# Patient Record
Sex: MALE | Race: WHITE | NOT HISPANIC OR LATINO | Employment: OTHER | URBAN - METROPOLITAN AREA
[De-identification: names, ages, dates, MRNs, and addresses within clinical notes are randomized per-mention and may not be internally consistent; named-entity substitution may affect disease eponyms.]

---

## 2017-02-06 ENCOUNTER — GENERIC CONVERSION - ENCOUNTER (OUTPATIENT)
Dept: OTHER | Facility: OTHER | Age: 82
End: 2017-02-06

## 2017-03-06 ENCOUNTER — GENERIC CONVERSION - ENCOUNTER (OUTPATIENT)
Dept: OTHER | Facility: OTHER | Age: 82
End: 2017-03-06

## 2017-03-20 ENCOUNTER — TRANSCRIBE ORDERS (OUTPATIENT)
Dept: ADMINISTRATIVE | Facility: HOSPITAL | Age: 82
End: 2017-03-20

## 2017-03-20 ENCOUNTER — APPOINTMENT (OUTPATIENT)
Dept: LAB | Facility: HOSPITAL | Age: 82
End: 2017-03-20
Payer: MEDICARE

## 2017-03-20 DIAGNOSIS — Z00.00 ROUTINE GENERAL MEDICAL EXAMINATION AT A HEALTH CARE FACILITY: ICD-10-CM

## 2017-03-20 DIAGNOSIS — I10 ESSENTIAL HYPERTENSION, MALIGNANT: ICD-10-CM

## 2017-03-20 DIAGNOSIS — F45.8 ANXIETY HYPERVENTILATION: ICD-10-CM

## 2017-03-20 DIAGNOSIS — R73.9 BLOOD GLUCOSE ELEVATED: ICD-10-CM

## 2017-03-20 DIAGNOSIS — D64.9 ANEMIA, UNSPECIFIED: Primary | ICD-10-CM

## 2017-03-20 DIAGNOSIS — E83.51 HYPOCALCEMIA: ICD-10-CM

## 2017-03-20 DIAGNOSIS — F41.9 ANXIETY HYPERVENTILATION: ICD-10-CM

## 2017-03-20 DIAGNOSIS — D64.9 ANEMIA, UNSPECIFIED: ICD-10-CM

## 2017-03-20 DIAGNOSIS — E78.5 HYPERLIPIDEMIA, UNSPECIFIED HYPERLIPIDEMIA TYPE: ICD-10-CM

## 2017-03-20 LAB
ALBUMIN SERPL BCP-MCNC: 3.7 G/DL (ref 3.5–5)
ALP SERPL-CCNC: 65 U/L (ref 46–116)
ALT SERPL W P-5'-P-CCNC: 23 U/L (ref 12–78)
ANION GAP SERPL CALCULATED.3IONS-SCNC: 8 MMOL/L (ref 4–13)
AST SERPL W P-5'-P-CCNC: 24 U/L (ref 5–45)
BASOPHILS # BLD AUTO: 0.04 THOUSANDS/ΜL (ref 0–0.1)
BASOPHILS NFR BLD AUTO: 1 % (ref 0–1)
BILIRUB SERPL-MCNC: 0.4 MG/DL (ref 0.2–1)
BUN SERPL-MCNC: 22 MG/DL (ref 5–25)
CALCIUM SERPL-MCNC: 8.4 MG/DL (ref 8.3–10.1)
CHLORIDE SERPL-SCNC: 105 MMOL/L (ref 100–108)
CO2 SERPL-SCNC: 31 MMOL/L (ref 21–32)
CREAT SERPL-MCNC: 1.04 MG/DL (ref 0.6–1.3)
EOSINOPHIL # BLD AUTO: 0.21 THOUSAND/ΜL (ref 0–0.61)
EOSINOPHIL NFR BLD AUTO: 4 % (ref 0–6)
ERYTHROCYTE [DISTWIDTH] IN BLOOD BY AUTOMATED COUNT: 14.7 % (ref 11.6–15.1)
GFR SERPL CREATININE-BSD FRML MDRD: >60 ML/MIN/1.73SQ M
GLUCOSE P FAST SERPL-MCNC: 100 MG/DL (ref 65–99)
HCT VFR BLD AUTO: 38.1 % (ref 36.5–49.3)
HGB BLD-MCNC: 11.9 G/DL (ref 12–17)
LDLC SERPL DIRECT ASSAY-MCNC: 65 MG/DL (ref 0–100)
LYMPHOCYTES # BLD AUTO: 1.44 THOUSANDS/ΜL (ref 0.6–4.47)
LYMPHOCYTES NFR BLD AUTO: 28 % (ref 14–44)
MCH RBC QN AUTO: 28 PG (ref 26.8–34.3)
MCHC RBC AUTO-ENTMCNC: 31.2 G/DL (ref 31.4–37.4)
MCV RBC AUTO: 90 FL (ref 82–98)
MONOCYTES # BLD AUTO: 0.55 THOUSAND/ΜL (ref 0.17–1.22)
MONOCYTES NFR BLD AUTO: 11 % (ref 4–12)
NEUTROPHILS # BLD AUTO: 2.87 THOUSANDS/ΜL (ref 1.85–7.62)
NEUTS SEG NFR BLD AUTO: 56 % (ref 43–75)
PLATELET # BLD AUTO: 189 THOUSANDS/UL (ref 149–390)
PMV BLD AUTO: 11.4 FL (ref 8.9–12.7)
POTASSIUM SERPL-SCNC: 4.1 MMOL/L (ref 3.5–5.3)
PROT SERPL-MCNC: 7.4 G/DL (ref 6.4–8.2)
RBC # BLD AUTO: 4.25 MILLION/UL (ref 3.88–5.62)
SODIUM SERPL-SCNC: 144 MMOL/L (ref 136–145)
WBC # BLD AUTO: 5.11 THOUSAND/UL (ref 4.31–10.16)

## 2017-03-20 PROCEDURE — 80053 COMPREHEN METABOLIC PANEL: CPT

## 2017-03-20 PROCEDURE — 85025 COMPLETE CBC W/AUTO DIFF WBC: CPT

## 2017-03-20 PROCEDURE — 36415 COLL VENOUS BLD VENIPUNCTURE: CPT

## 2017-03-20 PROCEDURE — 83721 ASSAY OF BLOOD LIPOPROTEIN: CPT

## 2017-03-22 ENCOUNTER — ALLSCRIPTS OFFICE VISIT (OUTPATIENT)
Dept: OTHER | Facility: OTHER | Age: 82
End: 2017-03-22

## 2017-04-03 ENCOUNTER — GENERIC CONVERSION - ENCOUNTER (OUTPATIENT)
Dept: OTHER | Facility: OTHER | Age: 82
End: 2017-04-03

## 2017-04-17 ENCOUNTER — GENERIC CONVERSION - ENCOUNTER (OUTPATIENT)
Dept: OTHER | Facility: OTHER | Age: 82
End: 2017-04-17

## 2017-05-01 ENCOUNTER — GENERIC CONVERSION - ENCOUNTER (OUTPATIENT)
Dept: OTHER | Facility: OTHER | Age: 82
End: 2017-05-01

## 2017-07-05 ENCOUNTER — GENERIC CONVERSION - ENCOUNTER (OUTPATIENT)
Dept: OTHER | Facility: OTHER | Age: 82
End: 2017-07-05

## 2017-08-14 ENCOUNTER — GENERIC CONVERSION - ENCOUNTER (OUTPATIENT)
Dept: OTHER | Facility: OTHER | Age: 82
End: 2017-08-14

## 2017-09-11 ENCOUNTER — GENERIC CONVERSION - ENCOUNTER (OUTPATIENT)
Dept: OTHER | Facility: OTHER | Age: 82
End: 2017-09-11

## 2017-09-25 ENCOUNTER — GENERIC CONVERSION - ENCOUNTER (OUTPATIENT)
Dept: OTHER | Facility: OTHER | Age: 82
End: 2017-09-25

## 2017-09-28 ENCOUNTER — APPOINTMENT (OUTPATIENT)
Dept: LAB | Facility: HOSPITAL | Age: 82
End: 2017-09-28
Payer: MEDICARE

## 2017-09-28 ENCOUNTER — TRANSCRIBE ORDERS (OUTPATIENT)
Dept: ADMINISTRATIVE | Facility: HOSPITAL | Age: 82
End: 2017-09-28

## 2017-09-28 DIAGNOSIS — D64.9 ANEMIA, UNSPECIFIED: Primary | ICD-10-CM

## 2017-09-28 DIAGNOSIS — D64.9 ANEMIA, UNSPECIFIED: ICD-10-CM

## 2017-09-28 LAB
ALBUMIN SERPL BCP-MCNC: 3.9 G/DL (ref 3.5–5)
ALP SERPL-CCNC: 57 U/L (ref 46–116)
ALT SERPL W P-5'-P-CCNC: 25 U/L (ref 12–78)
ANION GAP SERPL CALCULATED.3IONS-SCNC: 7 MMOL/L (ref 4–13)
AST SERPL W P-5'-P-CCNC: 23 U/L (ref 5–45)
BASOPHILS # BLD AUTO: 0.04 THOUSANDS/ΜL (ref 0–0.1)
BASOPHILS NFR BLD AUTO: 1 % (ref 0–1)
BILIRUB SERPL-MCNC: 0.7 MG/DL (ref 0.2–1)
BUN SERPL-MCNC: 21 MG/DL (ref 5–25)
CALCIUM SERPL-MCNC: 8.9 MG/DL (ref 8.3–10.1)
CHLORIDE SERPL-SCNC: 106 MMOL/L (ref 100–108)
CHOLEST SERPL-MCNC: 117 MG/DL (ref 50–200)
CO2 SERPL-SCNC: 30 MMOL/L (ref 21–32)
CREAT SERPL-MCNC: 0.95 MG/DL (ref 0.6–1.3)
EOSINOPHIL # BLD AUTO: 0.22 THOUSAND/ΜL (ref 0–0.61)
EOSINOPHIL NFR BLD AUTO: 4 % (ref 0–6)
ERYTHROCYTE [DISTWIDTH] IN BLOOD BY AUTOMATED COUNT: 15 % (ref 11.6–15.1)
EST. AVERAGE GLUCOSE BLD GHB EST-MCNC: 120 MG/DL
GFR SERPL CREATININE-BSD FRML MDRD: 74 ML/MIN/1.73SQ M
GLUCOSE P FAST SERPL-MCNC: 95 MG/DL (ref 65–99)
HBA1C MFR BLD: 5.8 % (ref 4.2–6.3)
HCT VFR BLD AUTO: 38.4 % (ref 36.5–49.3)
HDLC SERPL-MCNC: 42 MG/DL (ref 40–60)
HGB BLD-MCNC: 12.2 G/DL (ref 12–17)
LDLC SERPL CALC-MCNC: 57 MG/DL (ref 0–100)
LYMPHOCYTES # BLD AUTO: 1.24 THOUSANDS/ΜL (ref 0.6–4.47)
LYMPHOCYTES NFR BLD AUTO: 25 % (ref 14–44)
MCH RBC QN AUTO: 28.5 PG (ref 26.8–34.3)
MCHC RBC AUTO-ENTMCNC: 31.8 G/DL (ref 31.4–37.4)
MCV RBC AUTO: 90 FL (ref 82–98)
MONOCYTES # BLD AUTO: 0.52 THOUSAND/ΜL (ref 0.17–1.22)
MONOCYTES NFR BLD AUTO: 10 % (ref 4–12)
NEUTROPHILS # BLD AUTO: 3.03 THOUSANDS/ΜL (ref 1.85–7.62)
NEUTS SEG NFR BLD AUTO: 60 % (ref 43–75)
PLATELET # BLD AUTO: 171 THOUSANDS/UL (ref 149–390)
PMV BLD AUTO: 11.3 FL (ref 8.9–12.7)
POTASSIUM SERPL-SCNC: 4.2 MMOL/L (ref 3.5–5.3)
PROT SERPL-MCNC: 7.4 G/DL (ref 6.4–8.2)
PSA SERPL-MCNC: 0.7 NG/ML (ref 0–4)
RBC # BLD AUTO: 4.28 MILLION/UL (ref 3.88–5.62)
SODIUM SERPL-SCNC: 143 MMOL/L (ref 136–145)
TRIGL SERPL-MCNC: 88 MG/DL
WBC # BLD AUTO: 5.05 THOUSAND/UL (ref 4.31–10.16)

## 2017-09-28 PROCEDURE — 80053 COMPREHEN METABOLIC PANEL: CPT

## 2017-09-28 PROCEDURE — 80061 LIPID PANEL: CPT

## 2017-09-28 PROCEDURE — 83036 HEMOGLOBIN GLYCOSYLATED A1C: CPT

## 2017-09-28 PROCEDURE — G0103 PSA SCREENING: HCPCS

## 2017-09-28 PROCEDURE — 36415 COLL VENOUS BLD VENIPUNCTURE: CPT

## 2017-09-28 PROCEDURE — 85025 COMPLETE CBC W/AUTO DIFF WBC: CPT

## 2017-10-04 ENCOUNTER — ALLSCRIPTS OFFICE VISIT (OUTPATIENT)
Dept: OTHER | Facility: OTHER | Age: 82
End: 2017-10-04

## 2017-10-10 ENCOUNTER — GENERIC CONVERSION - ENCOUNTER (OUTPATIENT)
Dept: OTHER | Facility: OTHER | Age: 82
End: 2017-10-10

## 2017-10-27 NOTE — PROGRESS NOTES
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)    Plan  Anxiety, Arteriosclerotic cardiovascular disease, Health Maintenance, Hyperglycemia,  Hyperlipidemia, Hypertension, Hypocalcemia, Insomnia    · (1) COMPREHENSIVE METABOLIC PANEL; Status:Active; Requested for:01Apr2018;    · (1) HEMOGLOBIN A1C; Status:Active; Requested for:01Apr2018;    · (1) LDL,DIRECT; Status:Active; Requested for:01Apr2018;    · (1) TSH; Status:Active; Requested for:01Apr2018;    · Follow-up visit in 6 months Evaluation and Treatment  Follow-up  Status: Hold For -  Scheduling  Requested for: 34GEP5590   · Continue with our present treatment plan ; Status:Complete;   Done: 76EAT6372  Need for prophylactic vaccination and inoculation against influenza    · Fluzone High-Dose 0 5 ML Intramuscular Suspension Prefilled Syringe    Discussion/Summary    #1  Hypertension-stable at 1 50/58  Hyperlipidemia-cholesterol numbers are at goal with LDL 57  Hyperglycemia-hemoglobin A1c went up from 0 6 to 5 8%  ASCVD-patient is seen by Cardiology on a regular basis  His physician it is at [facility] continue his good state of health  6 months with labs  Possible side effects of new medications were reviewed with the patient/guardian today  The treatment plan was reviewed with the patient/guardian  The patient/guardian understands and agrees with the treatment plan     Self Referrals: No      Chief Complaint  Follow up anemia, anxiety, hyperlipidemia, HTNBW resultsDose flu immunization administered today - [facility]      History of Present Illness  This is an 26-year-old male who comes in accompanied by his wife for his regular 6 month visit  He is feeling well with no complaints or problems   His blood pressure today is 150/58 and his weight is down 2 lb from the previous visit to 197 lb  Reviewing his labs, his cholesterol numbers are at goal with the LDL 56  His CBC revealed that his hemoglobin went up from 11 9 to 12 2 and is now within normal range  His PSA remains the same at 0 7, glucose 95 down from 100 and his hemoglobin A1c went up from 5 6 to 5 8%  Review of Systems    Constitutional: No fever or chills, feels well, no tiredness, no recent weight gain or weight loss  ENT: no complaints of earache, no hearing loss, no nosebleeds, no nasal discharge, no sore throat, no hoarseness  Cardiovascular: No complaints of slow heart rate, no fast heart rate, no chest pain, no palpitations, no leg claudication, no lower extremity  Respiratory: No complaints of shortness of breath, no wheezing, no cough, no SOB on exertion, no orthopnea or PND  Gastrointestinal: No complaints of abdominal pain, no constipation, no nausea or vomiting, no diarrhea or bloody stools  Genitourinary: No complaints of dysuria, no incontinence, no hesitancy, no nocturia, no genital lesion, no testicular pain  Preventive Quality 65 and Older: Falls Risk: The patient fell 0 times in the past 12 months  The patient currently has no urinary incontinence symptoms  ROS reviewed  Active Problems  1  Anemia (285 9) (D64 9)   2  Anxiety (300 00) (F41 9)   3  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,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)   4  Arthritis (716 90) (M19 90)   5  Encounter for prostate cancer screening (V76 44) (Z12 5)   6  Herpes zoster (053 9) (B02 9)   7  Hyperglycemia (790 29) (R73 9)   8  Hyperlipidemia (272 4) (E78 5)   9  Hypertension (401 9) (I10)   10  Hypocalcemia (275 41) (E83 51)   11  Insomnia (780 52) (G47 00)   12  Need for prophylactic vaccination and inoculation against influenza (V04 81) (Z23)   13  Screening for genitourinary condition (V81 6) (Z13 89)   14  Screening for glaucoma (V80 1) (Z13 5)   15   Trigger thumb of right hand (727 03) (M65 311)    Past Medical History  1  History of upper respiratory infection (V12 09) (Z87 09)   2  History of Need for prophylactic vaccination and inoculation against influenza (V04 81)   (Z23)   3  History of Screening for neurological condition (V80 09) (Z13 89)    The active problems and past medical history were reviewed and updated today  Surgical History  1  History of CABG   2  History of Mitral Valve Replacement    The surgical history was reviewed and updated today  Family History  Mother    1  Family history of Alzheimer Disease    The family history was reviewed and updated today  Social History   · Denied: History of Alcohol Use (History)   · Former smoker (Y34 73) (J20 837)  The social history was reviewed and updated today  The social history was reviewed and is unchanged  Current Meds   1  Furosemide 20 MG Oral Tablet; Therapy: 00KRS5113 to ([phone])  Requested for: 59MJN4229 Recorded   2  Iron Supplement 325 (65 Fe) MG TABS; Therapy: (Recorded:04Oct2017) to Recorded   3  Lipitor 10 MG Oral Tablet; Therapy: 12VPX2670 to ([phone])  Requested for: 54EJI2568 Recorded   4  Lisinopril 5 MG Oral Tablet; Therapy: 78SZF0776 to (Evaluate:02Jan2018)  Requested for: 70WAK7946 Recorded   5  Metoprolol Tartrate 25 MG Oral Tablet; Therapy: 85DOT8116 to (711 365 824)  Requested for: 14ZZM0016 Recorded   6  Potassium Chloride Oly ER 20 MEQ Oral Tablet Extended Release; Therapy: 01QYH3810 to (Last Rx:15Iua3211)  Requested for: 60Cky6415 Ordered   7  Warfarin Sodium 5 MG Oral Tablet; Therapy: 10UCY0603 to (Last Rx:10Oct2011)  Requested for: 10Oct2011 Ordered    The medication list was reviewed and updated today  Allergies  1   No Known Drug Allergies    Vitals  Vital Signs    Recorded: 81BOS9072 09:53AM   Heart Rate 64, L Radial   Pulse Quality Regular, L Radial   Systolic 543, LUE, Sitting   Diastolic 58, LUE, Sitting   BP CUFF SIZE Large   Height 5 ft 11 in   Weight 197 lb    BMI Calculated 27 48   BSA Calculated 2 1     Physical Exam    Constitutional   General appearance: No acute distress, well appearing and well nourished  Pulmonary   Auscultation of lungs: Clear to auscultation, equal breath sounds bilaterally, no wheezes, no rales, no rhonci  Cardiovascular   Auscultation of heart: Normal rate and rhythm, normal S1 and S2, without murmurs  Examination of extremities for edema and/or varicosities: Normal     Lymphatic   Palpation of lymph nodes in neck: No lymphadenopathy  Musculoskeletal   Gait and station: Normal     Skin   Skin and subcutaneous tissue: Normal without rashes or lesions  Psychiatric   Orientation to person, place and time: Normal     Mood and affect: Normal          Results/Data  PHQ-9 Adult Depression Screening 56LMB5426 10:43AM User, s     Test Name Result Flag Reference   PHQ-9 Adult Depression Score 3     Over the last two weeks, how often have you been bothered by any of the following problems? Little interest or pleasure in doing things: Not at all - 0  Feeling down, depressed, or hopeless: Not at all - 0  Trouble falling or staying asleep, or sleeping too much: Several days - 1  Feeling tired or having little energy: Several days - 1  Poor appetite or over eating: Several days - 1  Feeling bad about yourself - or that you are a failure or have let yourself or your family down: Not at all - 0  Trouble concentrating on things, such as reading the newspaper or watching television: Not at all - 0  Moving or speaking so slowly that other people could have noticed   Or the opposite -  being so fidgety or restless that you have been moving around a lot more than usual: Not at all - 0  Thoughts that you would be better off dead, or of hurting yourself in some way: Not at all - 0   PHQ-9 Adult Depression Screening Negative     PHQ-9 Difficulty Level Not difficult at all     PHQ-9 Severity Minimal Depression         Future Appointments    Date/Time Provider Specialty Site   04/12/2018 11:30 AM Andrena Kawasaki, [facility]     Signatures   Electronically signed by : Brenda Schroeder [facility]; Oct  4 2017 10:51AM EST                       (Author)    Electronically signed by : Brenda Schroeder, [facility]; Oct  4 2017 11:08AM EST                       (Author)    Electronically signed by : CHRISTOFER Schuster ; Oct  4 2017  1:16PM EST

## 2017-10-30 ENCOUNTER — GENERIC CONVERSION - ENCOUNTER (OUTPATIENT)
Dept: OTHER | Facility: OTHER | Age: 82
End: 2017-10-30

## 2017-11-27 ENCOUNTER — GENERIC CONVERSION - ENCOUNTER (OUTPATIENT)
Dept: FAMILY MEDICINE CLINIC | Facility: CLINIC | Age: 82
End: 2017-11-27

## 2018-01-11 NOTE — RESULT NOTES
Message   Recorded as Task   Date: 03/30/2016 01:53 PM, Created By: Sunny Huerta   Task Name: Call Patient with results   Assigned To: Preston Armstrong   Regarding Patient: Rossy Grier, Status: Active   Comment:    Sunny Huerta - 30 Mar 2016 1:53 PM     Patient Phone: [phone]    I called the patient with the results of his Hemoccults  2 with a Hemoccults were negative and one was positive  The patient declines a colonoscopy and will not reconsider

## 2018-01-12 VITALS
DIASTOLIC BLOOD PRESSURE: 58 MMHG | SYSTOLIC BLOOD PRESSURE: 150 MMHG | WEIGHT: 197 LBS | BODY MASS INDEX: 27.58 KG/M2 | HEART RATE: 64 BPM | HEIGHT: 71 IN

## 2018-01-13 VITALS
HEIGHT: 71 IN | BODY MASS INDEX: 27.86 KG/M2 | HEART RATE: 64 BPM | WEIGHT: 199 LBS | DIASTOLIC BLOOD PRESSURE: 70 MMHG | SYSTOLIC BLOOD PRESSURE: 124 MMHG

## 2018-01-13 NOTE — RESULT NOTES
Verified Results  Hemoccult Screening - POC 85BJV3962 11:04AM Leopoldo [place]   2 negative and 3rd juhi 3/27/16 was positive       Test Name Result Flag Reference   Hemoccult Positive

## 2018-01-13 NOTE — PROGRESS NOTES
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)    Plan  Anxiety, Arteriosclerotic cardiovascular disease, Health Maintenance, Hyperglycemia,  Hyperlipidemia, Hypertension, Hypocalcemia, Insomnia    · (1) COMPREHENSIVE METABOLIC PANEL; Status:Active; Requested for:01Apr2018;    · (1) HEMOGLOBIN A1C; Status:Active; Requested for:01Apr2018;    · (1) LDL,DIRECT; Status:Active; Requested for:01Apr2018;    · (1) TSH; Status:Active; Requested for:01Apr2018;    · Follow-up visit in 6 months Evaluation and Treatment  Follow-up  Status: Hold For -  Scheduling  Requested for: 09QWZ9901   · Continue with our present treatment plan ; Status:Complete;   Done: 53PRN1354  Need for prophylactic vaccination and inoculation against influenza    · Fluzone High-Dose 0 5 ML Intramuscular Suspension Prefilled Syringe  Screening for genitourinary condition    · *VB - Urinary Incontinence Screen (Dx Z13 89 Screen for UI); Status:Complete -  Retrospective By Protocol Authorization;   Done: 71VYD6963 11:20AM    Discussion/Summary    Initial annual wellness visit  Chief Complaint  Medicare Wellness - [facility]      History of Present Illness  Initial annual wellness visit   The patient is being seen for the initial annual wellness visit  Medicare Screening and Risk Factors   Hospitalizations: no previous hospitalizations  Medicare Screening Tests Risk Questions   Drug and Alcohol Use: The patient is a former cigarette smoker  The patient reports occasional alcohol use  He has never used illicit drugs     Diet and Physical Activity: Current diet includes well balanced meals, low fat food choices, low carbohydrate food choices, low salt food choices, 1 servings of fruit per day, 2 servings of vegetables per day, occasional servings of meat per day, 1 servings of whole grains per day, 1 servings of simple carbohydrates per day, 1 servings of dairy products per day, 2 cups of coffee per day and 1 glass water  He exercises 1-2 times per week  Exercise: walking 10 minutes per week  Mood Disorder and Cognitive Impairment Screening: He denies feeling down, depressed, or hopeless over the past two weeks  He denies feeling little interest or pleasure in doing things over the past two weeks  Cognitive impairment screening: denies difficulty learning/retaining new information, denies difficulty handling complex tasks, denies difficulty with reasoning, denies difficulty with spatial ability and orientation, denies difficulty with language and denies difficulty with behavior  Functional Ability/Level of Safety: Hearing is normal bilaterally  He denies hearing difficulties  He does not use a hearing aid  Activities of daily living details: does not need help using the phone, no transportation help needed, does not need help shopping, no meal preparation help needed, does not need help doing housework, does not need help doing laundry, does not need help managing medications and does not need help managing money  Home safety risk factors:  no unfamiliar surroundings, no loose rugs, no poor household lighting, no uneven floors, no household clutter, grab bars in the bathroom and handrails on the stairs  Advance Directives: Advance directives: living will, durable power of  for health care directives and advance directives  Co-Managers and Medical Equipment/Suppliers: See Patient Care Team      Patient Care Team    Care Team Member Role Specialty Office Number   Saima ERICKSON MD  Cardiology [phone] [facility]  Physician Assistant [phone]         Josh Morgan MD  Cardiology [phone]     Active Problems    1  Anemia (285 9) (D64 9)   2  Anxiety (300 00) (F41 9)   3  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,440  9) (I25 10)   4  Arthritis (716 90) (M19 90)   5  Encounter for prostate cancer screening (V76 44) (Z12 5)   6  Herpes zoster (053 9) (B02 9)   7  Hyperglycemia (790 29) (R73 9)   8  Hyperlipidemia (272 4) (E78 5)   9  Hypertension (401 9) (I10)   10  Hypocalcemia (275 41) (E83 51)   11  Insomnia (780 52) (G47 00)   12  Need for prophylactic vaccination and inoculation against influenza (V04 81) (Z23)   13  Screening for genitourinary condition (V81 6) (Z13 89)   14  Screening for glaucoma (V80 1) (Z13 5)   15  Trigger thumb of right hand (727 03) (M65 311)    Past Medical History    1  History of upper respiratory infection (V12 09) (Z87 09)   2  History of Need for prophylactic vaccination and inoculation against influenza (V04 81)   (Z23)   3  History of Screening for neurological condition (V80 09) (Z13 89)    Surgical History    1  History of CABG   2  History of Mitral Valve Replacement    Family History  Mother    1  Family history of Alzheimer Disease    Social History    · Denied: History of Alcohol Use (History)   · Former smoker (A12 94) (Q66 120)   ·    · No secondhand smoke exposure (V49 89) (Z78 9)   · Retired    Current Meds   1  Furosemide 20 MG Oral Tablet; Therapy: 93FPP2189 to (185 211 824)  Requested for: 42DHE4483 Recorded   2  Iron Supplement 325 (65 Fe) MG TABS; Therapy: (Recorded:04Oct2017) to Recorded   3  Lipitor 10 MG Oral Tablet; Therapy: 63KGG9800 to (749 320 824)  Requested for: 97LRS3410 Recorded   4  Lisinopril 5 MG Oral Tablet; Therapy: 17AYM7074 to (Evaluate:02Jan2018)  Requested for: 66KQV4107 Recorded   5  Metoprolol Tartrate 25 MG Oral Tablet; Therapy: 35LWX5635 to (246 789 824)  Requested for: 19KKD4844 Recorded   6  Potassium Chloride Oly ER 20 MEQ Oral Tablet Extended Release; Therapy: 34IMR6879 to (Last Rx:07Vqh7678)  Requested for: 87Xxm9330 Ordered   7  Warfarin Sodium 5 MG Oral Tablet;    Therapy: 94HMF9460 to (Last Rx:10Oct2011) Requested for: 40NQE1651 Ordered    Allergies    1  No Known Drug Allergies    Immunizations  Influenza --- Wyn Raw: Nov 2011; Series2: [id number]-3011Itgm Andrews: 02-Dec-2013; Todd Late:  17-Oct-2014; Series5: 18-Sep-2015; Series6: 20-Sep-2016   PPSV --- Wyn Raw: Oct 2001   Tetanus --- Series1: May 2008     Vitals  Signs   Recorded: 25EHH9770 09:53AM   Heart Rate: 64, L Radial  Pulse Quality: Regular, L Radial  Systolic: 521, LUE, Sitting  Diastolic: 58, LUE, Sitting  BP Cuff Size: Large  Height: 5 ft 11 in  Weight: 197 lb   BMI Calculated: 27 48  BSA Calculated: 2 1    Results/Data  *VB - Urinary Incontinence Screen (Dx Z13 89 Screen for UI) 47IKE4541 11:20AM Marc Bush     Test Name Result Flag Reference   Urinary Incontinence Assessment 04Oct2017       PHQ-9 Adult Depression Screening 98FFJ8542 10:43AM User, Ahs     Test Name Result Flag Reference   PHQ-9 Adult Depression Score 3     Over the last two weeks, how often have you been bothered by any of the following problems? Little interest or pleasure in doing things: Not at all - 0  Feeling down, depressed, or hopeless: Not at all - 0  Trouble falling or staying asleep, or sleeping too much: Several days - 1  Feeling tired or having little energy: Several days - 1  Poor appetite or over eating: Several days - 1  Feeling bad about yourself - or that you are a failure or have let yourself or your family down: Not at all - 0  Trouble concentrating on things, such as reading the newspaper or watching television: Not at all - 0  Moving or speaking so slowly that other people could have noticed   Or the opposite -  being so fidgety or restless that you have been moving around a lot more than usual: Not at all - 0  Thoughts that you would be better off dead, or of hurting yourself in some way: Not at all - 0   PHQ-9 Adult Depression Screening Negative     PHQ-9 Difficulty Level Not difficult at all     PHQ-9 Severity Minimal Depression         Future Appointments    Date/Time Provider Specialty Site   04/12/2018 11:30 AM Mindy Thompson, [facility]     Signatures   Electronically signed by : Ying Wing, ; Oct  4 2017 10:10AM EST                       (Author)    Electronically signed by : Ying Wing, ; Oct  4 2017 11:18AM EST                       (Author)    Electronically signed by : hSauna Acosta, [facility]; Oct  4 2017 11:45AM EST                       (Author)    Electronically signed by : CHRISTOFER Pak ; Oct  4 2017  1:16PM EST

## 2018-04-01 DIAGNOSIS — F41.9 ANXIETY DISORDER: ICD-10-CM

## 2018-04-01 DIAGNOSIS — E78.5 HYPERLIPIDEMIA: ICD-10-CM

## 2018-04-01 DIAGNOSIS — I10 ESSENTIAL (PRIMARY) HYPERTENSION: ICD-10-CM

## 2018-04-01 DIAGNOSIS — R73.9 HYPERGLYCEMIA: ICD-10-CM

## 2018-04-01 DIAGNOSIS — G47.00 INSOMNIA: ICD-10-CM

## 2018-04-01 DIAGNOSIS — I25.10 ATHEROSCLEROTIC HEART DISEASE OF NATIVE CORONARY ARTERY WITHOUT ANGINA PECTORIS: ICD-10-CM

## 2018-04-01 DIAGNOSIS — E83.51 HYPOCALCEMIA: ICD-10-CM

## 2018-04-01 DIAGNOSIS — Z00.00 ENCOUNTER FOR GENERAL ADULT MEDICAL EXAMINATION WITHOUT ABNORMAL FINDINGS: ICD-10-CM

## 2018-04-03 ENCOUNTER — LAB (OUTPATIENT)
Dept: LAB | Facility: HOSPITAL | Age: 83
End: 2018-04-03
Payer: MEDICARE

## 2018-04-03 DIAGNOSIS — R73.9 HYPERGLYCEMIA: ICD-10-CM

## 2018-04-03 DIAGNOSIS — I25.10 ATHEROSCLEROTIC HEART DISEASE OF NATIVE CORONARY ARTERY WITHOUT ANGINA PECTORIS: ICD-10-CM

## 2018-04-03 DIAGNOSIS — I10 ESSENTIAL (PRIMARY) HYPERTENSION: ICD-10-CM

## 2018-04-03 DIAGNOSIS — E83.51 HYPOCALCEMIA: ICD-10-CM

## 2018-04-03 DIAGNOSIS — F41.9 ANXIETY DISORDER: ICD-10-CM

## 2018-04-03 DIAGNOSIS — E78.5 HYPERLIPIDEMIA: ICD-10-CM

## 2018-04-03 DIAGNOSIS — Z00.00 ENCOUNTER FOR GENERAL ADULT MEDICAL EXAMINATION WITHOUT ABNORMAL FINDINGS: ICD-10-CM

## 2018-04-03 DIAGNOSIS — G47.00 INSOMNIA: ICD-10-CM

## 2018-04-03 LAB
ALBUMIN SERPL BCP-MCNC: 3.8 G/DL (ref 3.5–5)
ALP SERPL-CCNC: 56 U/L (ref 46–116)
ALT SERPL W P-5'-P-CCNC: 22 U/L (ref 12–78)
ANION GAP SERPL CALCULATED.3IONS-SCNC: 7 MMOL/L (ref 4–13)
AST SERPL W P-5'-P-CCNC: 22 U/L (ref 5–45)
BILIRUB SERPL-MCNC: 0.7 MG/DL (ref 0.2–1)
BUN SERPL-MCNC: 25 MG/DL (ref 5–25)
CALCIUM SERPL-MCNC: 8.4 MG/DL (ref 8.3–10.1)
CHLORIDE SERPL-SCNC: 107 MMOL/L (ref 100–108)
CO2 SERPL-SCNC: 31 MMOL/L (ref 21–32)
CREAT SERPL-MCNC: 1 MG/DL (ref 0.6–1.3)
EST. AVERAGE GLUCOSE BLD GHB EST-MCNC: 114 MG/DL
GFR SERPL CREATININE-BSD FRML MDRD: 70 ML/MIN/1.73SQ M
GLUCOSE P FAST SERPL-MCNC: 100 MG/DL (ref 65–99)
HBA1C MFR BLD: 5.6 % (ref 4.2–6.3)
LDLC SERPL DIRECT ASSAY-MCNC: 70 MG/DL (ref 0–100)
POTASSIUM SERPL-SCNC: 4.3 MMOL/L (ref 3.5–5.3)
PROT SERPL-MCNC: 7.2 G/DL (ref 6.4–8.2)
SODIUM SERPL-SCNC: 145 MMOL/L (ref 136–145)
TSH SERPL DL<=0.05 MIU/L-ACNC: 2.58 UIU/ML (ref 0.36–3.74)

## 2018-04-03 PROCEDURE — 80053 COMPREHEN METABOLIC PANEL: CPT

## 2018-04-03 PROCEDURE — 83036 HEMOGLOBIN GLYCOSYLATED A1C: CPT

## 2018-04-03 PROCEDURE — 83721 ASSAY OF BLOOD LIPOPROTEIN: CPT

## 2018-04-03 PROCEDURE — 36415 COLL VENOUS BLD VENIPUNCTURE: CPT

## 2018-04-03 PROCEDURE — 84443 ASSAY THYROID STIM HORMONE: CPT

## 2018-04-12 ENCOUNTER — OFFICE VISIT (OUTPATIENT)
Dept: FAMILY MEDICINE CLINIC | Facility: CLINIC | Age: 83
End: 2018-04-12
Payer: MEDICARE

## 2018-04-12 VITALS
SYSTOLIC BLOOD PRESSURE: 140 MMHG | DIASTOLIC BLOOD PRESSURE: 60 MMHG | HEART RATE: 56 BPM | HEIGHT: 71 IN | BODY MASS INDEX: 27.58 KG/M2 | WEIGHT: 197 LBS

## 2018-04-12 DIAGNOSIS — R73.9 HYPERGLYCEMIA: ICD-10-CM

## 2018-04-12 DIAGNOSIS — I25.10 ARTERIOSCLEROTIC CARDIOVASCULAR DISEASE: ICD-10-CM

## 2018-04-12 DIAGNOSIS — E78.2 MIXED HYPERLIPIDEMIA: ICD-10-CM

## 2018-04-12 DIAGNOSIS — I10 ESSENTIAL HYPERTENSION: Primary | ICD-10-CM

## 2018-04-12 DIAGNOSIS — D64.9 ANEMIA, UNSPECIFIED TYPE: ICD-10-CM

## 2018-04-12 PROCEDURE — 99214 OFFICE O/P EST MOD 30 MIN: CPT | Performed by: FAMILY MEDICINE

## 2018-04-12 RX ORDER — LISINOPRIL 5 MG/1
TABLET ORAL
COMMUNITY
Start: 2011-05-19

## 2018-04-12 RX ORDER — FUROSEMIDE 20 MG/1
TABLET ORAL
COMMUNITY
Start: 2010-07-27

## 2018-04-12 RX ORDER — FERROUS SULFATE 325(65) MG
TABLET ORAL
COMMUNITY

## 2018-04-12 RX ORDER — WARFARIN SODIUM 5 MG/1
TABLET ORAL
COMMUNITY
Start: 2011-10-10

## 2018-04-12 RX ORDER — POTASSIUM CHLORIDE 20 MEQ/1
TABLET, EXTENDED RELEASE ORAL
COMMUNITY
Start: 2011-07-12

## 2018-04-12 RX ORDER — ATORVASTATIN CALCIUM 10 MG/1
TABLET, FILM COATED ORAL
COMMUNITY
Start: 2010-07-27

## 2018-04-12 NOTE — PROGRESS NOTES
Assessment/Plan:    Hypertension  His blood pressure is stable at 140/60  He currently is on lisinopril 5 mg daily, furosemide 20 mg daily  He is on metoprolol tartrate 25 mg daily  Arteriosclerotic cardiovascular disease  Stable and is followed by Cardiology  Anemia  Resolved  He currently takes ferrous sulfate 325 mg daily  Hyperglycemia  The patient's blood sugar is 100 and his hemoglobin A1c is 5 6%  Hyperlipidemia  His LDL is 70 and stable on atorvastatin 10 mg daily  Diagnoses and all orders for this visit:    Essential hypertension  -     CBC and differential; Future  -     Comprehensive metabolic panel; Future  -     HEMOGLOBIN A1C W/ EAG ESTIMATION; Future  -     Lipid Panel with Direct LDL reflex; Future    Hyperglycemia  -     CBC and differential; Future  -     Comprehensive metabolic panel; Future  -     HEMOGLOBIN A1C W/ EAG ESTIMATION; Future  -     Lipid Panel with Direct LDL reflex; Future    Mixed hyperlipidemia  -     CBC and differential; Future  -     Comprehensive metabolic panel; Future  -     HEMOGLOBIN A1C W/ EAG ESTIMATION; Future  -     Lipid Panel with Direct LDL reflex; Future    Arteriosclerotic cardiovascular disease  -     CBC and differential; Future  -     Comprehensive metabolic panel; Future  -     HEMOGLOBIN A1C W/ EAG ESTIMATION; Future  -     Lipid Panel with Direct LDL reflex; Future    Anemia, unspecified type  -     CBC and differential; Future  -     Comprehensive metabolic panel; Future  -     HEMOGLOBIN A1C W/ EAG ESTIMATION; Future  -     Lipid Panel with Direct LDL reflex; Future    Other orders  -     furosemide (LASIX) 20 mg tablet; Take by mouth  -     ferrous sulfate (IRON SUPPLEMENT) 325 (65 Fe) mg tablet; Take by mouth  -     atorvastatin (LIPITOR) 10 mg tablet; Take by mouth  -     lisinopril (ZESTRIL) 5 mg tablet; Take by mouth  -     metoprolol tartrate (LOPRESSOR) 25 mg tablet;  Take by mouth  -     potassium chloride (K-DUR,KLOR-CON) 20 mEq tablet; Take by mouth  -     warfarin (COUMADIN) 5 mg tablet; Take by mouth          Subjective:      Patient ID: Cornelio Pope is a 80 y o  male  CC:  Follow up anemia, anxiety, hyperglycemia, hyperlipidemia, HTN  Review BW results  -  [facility]    HPI:  This is an 60-year-old male who comes in for his regular 6 month visit  He is accompanied by his wife  He is feeling well with no complaints or problems  His blood pressure today is 140/60 and his weight has remained the same at 197 lb  Reviewing his labs, his glucose went up from 95 to 100, hemoglobin A1c went down from 5 8 to 5 6%, LDL is stable at 70 and TSH is within normal range  The following portions of the patient's history were reviewed and updated as appropriate: allergies, current medications, past family history, past medical history, past social history, past surgical history and problem list     Review of Systems   Constitutional: Negative  HENT: Negative  Eyes: Negative  Respiratory: Negative  Cardiovascular: Negative  Gastrointestinal: Negative  Endocrine: Negative  Genitourinary: Negative  Musculoskeletal: Negative  Skin: Negative  Allergic/Immunologic: Negative  Neurological: Negative  Hematological: Negative  Psychiatric/Behavioral: Negative  Vitals:    04/12/18 1130   BP: 140/60   BP Location: Left arm   Patient Position: Sitting   Cuff Size: Large   Pulse: 56   Weight: 89 4 kg (197 lb)   Height: 5' 11" (1 803 m)   Objective:      /60 (BP Location: Left arm, Patient Position: Sitting, Cuff Size: Large)   Pulse 56   Ht 5' 11" (1 803 m)   Wt 89 4 kg (197 lb)   BMI 27 48 kg/m²          Physical Exam   Constitutional: He is oriented to person, place, and time  He appears well-developed and well-nourished  HENT:   Head: Normocephalic     Right Ear: External ear normal    Left Ear: External ear normal    Mouth/Throat: Oropharynx is clear and moist    Eyes: Conjunctivae and EOM are normal  Pupils are equal, round, and reactive to light  Neck: Normal range of motion  Neck supple  Cardiovascular: Normal rate, regular rhythm and normal heart sounds  Pulmonary/Chest: Effort normal and breath sounds normal    Abdominal: Soft  Bowel sounds are normal    Musculoskeletal: Normal range of motion  Neurological: He is alert and oriented to person, place, and time  Skin: Skin is warm  Psychiatric: He has a normal mood and affect  His behavior is normal  Judgment and thought content normal    Nursing note and vitals reviewed

## 2018-04-12 NOTE — ASSESSMENT & PLAN NOTE
His blood pressure is stable at 140/60  He currently is on lisinopril 5 mg daily, furosemide 20 mg daily  He is on metoprolol tartrate 25 mg daily

## 2018-10-15 ENCOUNTER — APPOINTMENT (OUTPATIENT)
Dept: LAB | Facility: HOSPITAL | Age: 83
End: 2018-10-15
Payer: MEDICARE

## 2018-10-15 DIAGNOSIS — E78.2 MIXED HYPERLIPIDEMIA: ICD-10-CM

## 2018-10-15 DIAGNOSIS — I25.10 ARTERIOSCLEROTIC CARDIOVASCULAR DISEASE: ICD-10-CM

## 2018-10-15 DIAGNOSIS — I10 ESSENTIAL HYPERTENSION: ICD-10-CM

## 2018-10-15 DIAGNOSIS — R73.9 HYPERGLYCEMIA: ICD-10-CM

## 2018-10-15 DIAGNOSIS — D64.9 ANEMIA, UNSPECIFIED TYPE: ICD-10-CM

## 2018-10-15 LAB
ALBUMIN SERPL BCP-MCNC: 3.8 G/DL (ref 3.5–5)
ALP SERPL-CCNC: 67 U/L (ref 46–116)
ALT SERPL W P-5'-P-CCNC: 24 U/L (ref 12–78)
ANION GAP SERPL CALCULATED.3IONS-SCNC: 7 MMOL/L (ref 4–13)
AST SERPL W P-5'-P-CCNC: 25 U/L (ref 5–45)
BASOPHILS # BLD AUTO: 0.06 THOUSANDS/ΜL (ref 0–0.1)
BASOPHILS NFR BLD AUTO: 1 % (ref 0–1)
BILIRUB SERPL-MCNC: 0.6 MG/DL (ref 0.2–1)
BUN SERPL-MCNC: 22 MG/DL (ref 5–25)
CALCIUM SERPL-MCNC: 8.8 MG/DL (ref 8.3–10.1)
CHLORIDE SERPL-SCNC: 106 MMOL/L (ref 100–108)
CHOLEST SERPL-MCNC: 133 MG/DL (ref 50–200)
CO2 SERPL-SCNC: 30 MMOL/L (ref 21–32)
CREAT SERPL-MCNC: 1.07 MG/DL (ref 0.6–1.3)
EOSINOPHIL # BLD AUTO: 0.26 THOUSAND/ΜL (ref 0–0.61)
EOSINOPHIL NFR BLD AUTO: 5 % (ref 0–6)
ERYTHROCYTE [DISTWIDTH] IN BLOOD BY AUTOMATED COUNT: 13.9 % (ref 11.6–15.1)
EST. AVERAGE GLUCOSE BLD GHB EST-MCNC: 117 MG/DL
GFR SERPL CREATININE-BSD FRML MDRD: 64 ML/MIN/1.73SQ M
GLUCOSE P FAST SERPL-MCNC: 102 MG/DL (ref 65–99)
HBA1C MFR BLD: 5.7 % (ref 4.2–6.3)
HCT VFR BLD AUTO: 39.4 % (ref 36.5–49.3)
HDLC SERPL-MCNC: 44 MG/DL (ref 40–60)
HGB BLD-MCNC: 12.4 G/DL (ref 12–17)
IMM GRANULOCYTES # BLD AUTO: 0.01 THOUSAND/UL (ref 0–0.2)
IMM GRANULOCYTES NFR BLD AUTO: 0 % (ref 0–2)
LDLC SERPL CALC-MCNC: 65 MG/DL (ref 0–100)
LYMPHOCYTES # BLD AUTO: 1.18 THOUSANDS/ΜL (ref 0.6–4.47)
LYMPHOCYTES NFR BLD AUTO: 23 % (ref 14–44)
MCH RBC QN AUTO: 28.8 PG (ref 26.8–34.3)
MCHC RBC AUTO-ENTMCNC: 31.5 G/DL (ref 31.4–37.4)
MCV RBC AUTO: 91 FL (ref 82–98)
MONOCYTES # BLD AUTO: 0.57 THOUSAND/ΜL (ref 0.17–1.22)
MONOCYTES NFR BLD AUTO: 11 % (ref 4–12)
NEUTROPHILS # BLD AUTO: 3.17 THOUSANDS/ΜL (ref 1.85–7.62)
NEUTS SEG NFR BLD AUTO: 60 % (ref 43–75)
NRBC BLD AUTO-RTO: 0 /100 WBCS
PLATELET # BLD AUTO: 193 THOUSANDS/UL (ref 149–390)
PMV BLD AUTO: 11.7 FL (ref 8.9–12.7)
POTASSIUM SERPL-SCNC: 4.4 MMOL/L (ref 3.5–5.3)
PROT SERPL-MCNC: 7.4 G/DL (ref 6.4–8.2)
RBC # BLD AUTO: 4.31 MILLION/UL (ref 3.88–5.62)
SODIUM SERPL-SCNC: 143 MMOL/L (ref 136–145)
TRIGL SERPL-MCNC: 119 MG/DL
WBC # BLD AUTO: 5.25 THOUSAND/UL (ref 4.31–10.16)

## 2018-10-15 PROCEDURE — 85025 COMPLETE CBC W/AUTO DIFF WBC: CPT

## 2018-10-15 PROCEDURE — 80053 COMPREHEN METABOLIC PANEL: CPT

## 2018-10-15 PROCEDURE — 36415 COLL VENOUS BLD VENIPUNCTURE: CPT

## 2018-10-15 PROCEDURE — 83036 HEMOGLOBIN GLYCOSYLATED A1C: CPT

## 2018-10-15 PROCEDURE — 80061 LIPID PANEL: CPT

## 2018-10-23 ENCOUNTER — OFFICE VISIT (OUTPATIENT)
Dept: FAMILY MEDICINE CLINIC | Facility: CLINIC | Age: 83
End: 2018-10-23
Payer: MEDICARE

## 2018-10-23 VITALS
WEIGHT: 196 LBS | SYSTOLIC BLOOD PRESSURE: 138 MMHG | HEART RATE: 68 BPM | BODY MASS INDEX: 27.34 KG/M2 | DIASTOLIC BLOOD PRESSURE: 62 MMHG

## 2018-10-23 DIAGNOSIS — E78.2 MIXED HYPERLIPIDEMIA: ICD-10-CM

## 2018-10-23 DIAGNOSIS — I10 ESSENTIAL HYPERTENSION: Primary | ICD-10-CM

## 2018-10-23 DIAGNOSIS — Z23 NEED FOR INFLUENZA VACCINATION: ICD-10-CM

## 2018-10-23 DIAGNOSIS — D64.9 ANEMIA, UNSPECIFIED TYPE: ICD-10-CM

## 2018-10-23 DIAGNOSIS — R73.9 HYPERGLYCEMIA: ICD-10-CM

## 2018-10-23 PROCEDURE — 90662 IIV NO PRSV INCREASED AG IM: CPT | Performed by: FAMILY MEDICINE

## 2018-10-23 PROCEDURE — 99214 OFFICE O/P EST MOD 30 MIN: CPT | Performed by: FAMILY MEDICINE

## 2018-10-23 PROCEDURE — G0008 ADMIN INFLUENZA VIRUS VAC: HCPCS | Performed by: FAMILY MEDICINE

## 2018-10-23 RX ORDER — LATANOPROST 50 UG/ML
1 SOLUTION/ DROPS OPHTHALMIC
COMMUNITY

## 2018-10-23 NOTE — ASSESSMENT & PLAN NOTE
His blood pressure is stable 138/62 and he takes lisinopril 5 mg daily and metoprolol tartrate 25 mg daily

## 2018-10-23 NOTE — PROGRESS NOTES
Assessment/Plan:    Anemia  Resolved  Hyperglycemia  His hemoglobin A1c is 5 7% and his fasting sugar is 102  Hyperlipidemia  His cholesterol numbers are at goal with the LDL 65 any takes atorvastatin 10 mg daily  Hypertension  His blood pressure is stable 138/62 and he takes lisinopril 5 mg daily and metoprolol tartrate 25 mg daily  Diagnoses and all orders for this visit:    Essential hypertension  -     Comprehensive metabolic panel; Future  -     LDL cholesterol, direct; Future  -     TSH, 3rd generation; Future    Mixed hyperlipidemia  -     Comprehensive metabolic panel; Future  -     LDL cholesterol, direct; Future  -     TSH, 3rd generation; Future    Hyperglycemia  -     Comprehensive metabolic panel; Future  -     LDL cholesterol, direct; Future  -     TSH, 3rd generation; Future    Anemia, unspecified type  -     Comprehensive metabolic panel; Future  -     LDL cholesterol, direct; Future  -     TSH, 3rd generation; Future    Need for influenza vaccination  -     influenza vaccine, [id number], high-dose, PF 0 5 mL, for patients 65 yr+ (FLUZONE HIGH-DOSE)    Other orders  -     latanoprost (XALATAN) 0 005 % ophthalmic solution; 1 drop daily at bedtime          Subjective: Pt here for f/u to chronic conditions  lab results,flu vaccine  Patient ID: Joby Moseley is a 80 y o  male  This is an 15-year-old male who comes in for his regular 6 month appointment  He is feeling well with no complaints or problems  Has been cheating slightly on his diet however his cholesterol numbers have not shown his cheating  His blood pressure is 138/62 and his weight is down 1 lb from the previous visit to 196 lb  Reviewing his labs his cholesterol numbers are at goal with the LDL 65, CBC is normal, glucose is 102 and his hemoglobin A1c is 5 7%          The following portions of the patient's history were reviewed and updated as appropriate: allergies, current medications, past family history, past medical history, past social history, past surgical history and problem list     Review of Systems   Constitutional: Negative  HENT: Negative  Eyes: Negative  Respiratory: Negative  Cardiovascular: Negative  Gastrointestinal: Negative  Endocrine: Negative  Genitourinary: Negative  Musculoskeletal: Negative  Skin: Negative  Allergic/Immunologic: Negative  Neurological: Negative  Hematological: Negative  Psychiatric/Behavioral: Negative  Objective:      /62   Pulse 68   Wt 88 9 kg (196 lb)   BMI 27 34 kg/m²          Physical Exam   Constitutional: He is oriented to person, place, and time  He appears well-developed and well-nourished  HENT:   Head: Normocephalic  Right Ear: External ear normal    Left Ear: External ear normal    Mouth/Throat: Oropharynx is clear and moist    Eyes: Pupils are equal, round, and reactive to light  Conjunctivae and EOM are normal    Neck: Normal range of motion  Neck supple  Cardiovascular: Normal rate, regular rhythm and normal heart sounds  Pulmonary/Chest: Effort normal and breath sounds normal    Abdominal: Soft  Bowel sounds are normal    Musculoskeletal: Normal range of motion  Neurological: He is alert and oriented to person, place, and time  Skin: Skin is warm  Psychiatric: He has a normal mood and affect  His behavior is normal  Judgment and thought content normal    Nursing note and vitals reviewed

## 2018-11-13 ENCOUNTER — HOSPITAL ENCOUNTER (INPATIENT)
Facility: HOSPITAL | Age: 83
LOS: 3 days | Discharge: HOME | DRG: 813 | End: 2018-11-16
Attending: EMERGENCY MEDICINE | Admitting: HOSPITALIST
Payer: MEDICARE

## 2018-11-13 ENCOUNTER — APPOINTMENT (EMERGENCY)
Dept: RADIOLOGY | Facility: HOSPITAL | Age: 83
DRG: 813 | End: 2018-11-13
Payer: MEDICARE

## 2018-11-13 ENCOUNTER — TELEPHONE (OUTPATIENT)
Dept: FAMILY MEDICINE CLINIC | Facility: CLINIC | Age: 83
End: 2018-11-13

## 2018-11-13 DIAGNOSIS — R77.8 ELEVATED TROPONIN: ICD-10-CM

## 2018-11-13 DIAGNOSIS — D64.9 SEVERE ANEMIA: ICD-10-CM

## 2018-11-13 DIAGNOSIS — K92.2 GI BLEED: Primary | ICD-10-CM

## 2018-11-13 PROBLEM — D68.9 COAGULOPATHY (HCC): Status: ACTIVE | Noted: 2018-11-13

## 2018-11-13 PROBLEM — D62 ACUTE BLOOD LOSS ANEMIA: Status: ACTIVE | Noted: 2018-11-13

## 2018-11-13 LAB
ABO GROUP BLD BPU: NORMAL
ABO GROUP BLD BPU: NORMAL
ABO GROUP BLD: NORMAL
ALBUMIN SERPL BCP-MCNC: 3.4 G/DL (ref 3.5–5)
ALP SERPL-CCNC: 44 U/L (ref 46–116)
ALT SERPL W P-5'-P-CCNC: 22 U/L (ref 12–78)
ANION GAP SERPL CALCULATED.3IONS-SCNC: 10 MMOL/L (ref 4–13)
APTT PPP: 39 SECONDS (ref 26–38)
AST SERPL W P-5'-P-CCNC: 21 U/L (ref 5–45)
ATRIAL RATE: 56 BPM
BASOPHILS # BLD AUTO: 0.03 THOUSANDS/ΜL (ref 0–0.1)
BASOPHILS NFR BLD AUTO: 1 % (ref 0–1)
BILIRUB SERPL-MCNC: 0.4 MG/DL (ref 0.2–1)
BLD GP AB SCN SERPL QL: NEGATIVE
BPU ID: NORMAL
BPU ID: NORMAL
BUN SERPL-MCNC: 34 MG/DL (ref 5–25)
CALCIUM SERPL-MCNC: 8.2 MG/DL (ref 8.3–10.1)
CHLORIDE SERPL-SCNC: 108 MMOL/L (ref 100–108)
CO2 SERPL-SCNC: 24 MMOL/L (ref 21–32)
CREAT SERPL-MCNC: 1.26 MG/DL (ref 0.6–1.3)
CROSSMATCH: NORMAL
CROSSMATCH: NORMAL
EOSINOPHIL # BLD AUTO: 0.22 THOUSAND/ΜL (ref 0–0.61)
EOSINOPHIL NFR BLD AUTO: 4 % (ref 0–6)
ERYTHROCYTE [DISTWIDTH] IN BLOOD BY AUTOMATED COUNT: 14.7 % (ref 11.6–15.1)
GFR SERPL CREATININE-BSD FRML MDRD: 52 ML/MIN/1.73SQ M
GLUCOSE SERPL-MCNC: 120 MG/DL (ref 65–140)
HCT VFR BLD AUTO: 17.9 % (ref 36.5–49.3)
HGB BLD-MCNC: 5.4 G/DL (ref 12–17)
HGB BLD-MCNC: 7.2 G/DL (ref 12–17)
IMM GRANULOCYTES # BLD AUTO: 0.03 THOUSAND/UL (ref 0–0.2)
IMM GRANULOCYTES NFR BLD AUTO: 1 % (ref 0–2)
INR PPP: 4.54 (ref 0.86–1.17)
LYMPHOCYTES # BLD AUTO: 1.38 THOUSANDS/ΜL (ref 0.6–4.47)
LYMPHOCYTES NFR BLD AUTO: 23 % (ref 14–44)
MCH RBC QN AUTO: 28 PG (ref 26.8–34.3)
MCHC RBC AUTO-ENTMCNC: 30.2 G/DL (ref 31.4–37.4)
MCV RBC AUTO: 93 FL (ref 82–98)
MONOCYTES # BLD AUTO: 0.65 THOUSAND/ΜL (ref 0.17–1.22)
MONOCYTES NFR BLD AUTO: 11 % (ref 4–12)
NEUTROPHILS # BLD AUTO: 3.74 THOUSANDS/ΜL (ref 1.85–7.62)
NEUTS SEG NFR BLD AUTO: 60 % (ref 43–75)
NRBC BLD AUTO-RTO: 0 /100 WBCS
PLATELET # BLD AUTO: 279 THOUSANDS/UL (ref 149–390)
PMV BLD AUTO: 10.3 FL (ref 8.9–12.7)
POTASSIUM SERPL-SCNC: 4.1 MMOL/L (ref 3.5–5.3)
PROT SERPL-MCNC: 6.3 G/DL (ref 6.4–8.2)
PROTHROMBIN TIME: 40.7 SECONDS (ref 11.8–14.2)
QRS AXIS: 41 DEGREES
QRSD INTERVAL: 100 MS
QT INTERVAL: 348 MS
QTC INTERVAL: 446 MS
RBC # BLD AUTO: 1.93 MILLION/UL (ref 3.88–5.62)
RH BLD: POSITIVE
SODIUM SERPL-SCNC: 142 MMOL/L (ref 136–145)
SPECIMEN EXPIRATION DATE: NORMAL
T WAVE AXIS: -55 DEGREES
TROPONIN I SERPL-MCNC: 0.07 NG/ML
UNIT DISPENSE STATUS: NORMAL
UNIT DISPENSE STATUS: NORMAL
UNIT PRODUCT CODE: NORMAL
UNIT PRODUCT CODE: NORMAL
UNIT RH: NORMAL
UNIT RH: NORMAL
VENTRICULAR RATE: 99 BPM
WBC # BLD AUTO: 6.05 THOUSAND/UL (ref 4.31–10.16)

## 2018-11-13 PROCEDURE — 93010 ELECTROCARDIOGRAM REPORT: CPT | Performed by: INTERNAL MEDICINE

## 2018-11-13 PROCEDURE — 80053 COMPREHEN METABOLIC PANEL: CPT | Performed by: EMERGENCY MEDICINE

## 2018-11-13 PROCEDURE — 85730 THROMBOPLASTIN TIME PARTIAL: CPT | Performed by: EMERGENCY MEDICINE

## 2018-11-13 PROCEDURE — 86901 BLOOD TYPING SEROLOGIC RH(D): CPT | Performed by: EMERGENCY MEDICINE

## 2018-11-13 PROCEDURE — C9113 INJ PANTOPRAZOLE SODIUM, VIA: HCPCS | Performed by: EMERGENCY MEDICINE

## 2018-11-13 PROCEDURE — 86850 RBC ANTIBODY SCREEN: CPT | Performed by: EMERGENCY MEDICINE

## 2018-11-13 PROCEDURE — 82272 OCCULT BLD FECES 1-3 TESTS: CPT

## 2018-11-13 PROCEDURE — 99223 1ST HOSP IP/OBS HIGH 75: CPT | Performed by: HOSPITALIST

## 2018-11-13 PROCEDURE — 84484 ASSAY OF TROPONIN QUANT: CPT | Performed by: EMERGENCY MEDICINE

## 2018-11-13 PROCEDURE — P9021 RED BLOOD CELLS UNIT: HCPCS

## 2018-11-13 PROCEDURE — 85025 COMPLETE CBC W/AUTO DIFF WBC: CPT | Performed by: EMERGENCY MEDICINE

## 2018-11-13 PROCEDURE — 85610 PROTHROMBIN TIME: CPT | Performed by: EMERGENCY MEDICINE

## 2018-11-13 PROCEDURE — 86920 COMPATIBILITY TEST SPIN: CPT

## 2018-11-13 PROCEDURE — 36415 COLL VENOUS BLD VENIPUNCTURE: CPT

## 2018-11-13 PROCEDURE — 99285 EMERGENCY DEPT VISIT HI MDM: CPT

## 2018-11-13 PROCEDURE — 86900 BLOOD TYPING SEROLOGIC ABO: CPT | Performed by: EMERGENCY MEDICINE

## 2018-11-13 PROCEDURE — 85018 HEMOGLOBIN: CPT | Performed by: HOSPITALIST

## 2018-11-13 PROCEDURE — C9113 INJ PANTOPRAZOLE SODIUM, VIA: HCPCS | Performed by: HOSPITALIST

## 2018-11-13 PROCEDURE — 30233N1 TRANSFUSION OF NONAUTOLOGOUS RED BLOOD CELLS INTO PERIPHERAL VEIN, PERCUTANEOUS APPROACH: ICD-10-PCS | Performed by: EMERGENCY MEDICINE

## 2018-11-13 PROCEDURE — P9016 RBC LEUKOCYTES REDUCED: HCPCS

## 2018-11-13 PROCEDURE — 71046 X-RAY EXAM CHEST 2 VIEWS: CPT

## 2018-11-13 PROCEDURE — 93005 ELECTROCARDIOGRAM TRACING: CPT

## 2018-11-13 RX ORDER — ATORVASTATIN CALCIUM 10 MG/1
10 TABLET, FILM COATED ORAL
Status: DISCONTINUED | OUTPATIENT
Start: 2018-11-13 | End: 2018-11-16 | Stop reason: HOSPADM

## 2018-11-13 RX ORDER — SODIUM CHLORIDE 9 MG/ML
50 INJECTION, SOLUTION INTRAVENOUS CONTINUOUS
Status: DISCONTINUED | OUTPATIENT
Start: 2018-11-13 | End: 2018-11-15

## 2018-11-13 RX ORDER — PHYTONADIONE 10 MG/ML
10 INJECTION, EMULSION INTRAMUSCULAR; INTRAVENOUS; SUBCUTANEOUS ONCE
Status: DISCONTINUED | OUTPATIENT
Start: 2018-11-13 | End: 2018-11-13

## 2018-11-13 RX ORDER — FERROUS SULFATE 325(65) MG
325 TABLET ORAL
Status: DISCONTINUED | OUTPATIENT
Start: 2018-11-14 | End: 2018-11-16 | Stop reason: HOSPADM

## 2018-11-13 RX ORDER — ASPIRIN 81 MG/1
81 TABLET, CHEWABLE ORAL DAILY
COMMUNITY
End: 2018-11-16 | Stop reason: HOSPADM

## 2018-11-13 RX ORDER — LATANOPROST 50 UG/ML
1 SOLUTION/ DROPS OPHTHALMIC
Status: DISCONTINUED | OUTPATIENT
Start: 2018-11-13 | End: 2018-11-16 | Stop reason: HOSPADM

## 2018-11-13 RX ADMIN — SODIUM CHLORIDE 8 MG/HR: 9 INJECTION, SOLUTION INTRAVENOUS at 13:03

## 2018-11-13 RX ADMIN — METOPROLOL TARTRATE 25 MG: 25 TABLET ORAL at 22:13

## 2018-11-13 RX ADMIN — SODIUM CHLORIDE 100 ML/HR: 0.9 INJECTION, SOLUTION INTRAVENOUS at 22:16

## 2018-11-13 RX ADMIN — ATORVASTATIN CALCIUM 10 MG: 10 TABLET, FILM COATED ORAL at 16:51

## 2018-11-13 RX ADMIN — PHYTONADIONE 10 MG: 10 INJECTION, EMULSION INTRAMUSCULAR; INTRAVENOUS; SUBCUTANEOUS at 12:34

## 2018-11-13 RX ADMIN — LATANOPROST 1 DROP: 50 SOLUTION/ DROPS OPHTHALMIC at 22:13

## 2018-11-13 RX ADMIN — SODIUM CHLORIDE 8 MG/HR: 9 INJECTION, SOLUTION INTRAVENOUS at 22:33

## 2018-11-13 RX ADMIN — SODIUM CHLORIDE 80 MG: 9 INJECTION, SOLUTION INTRAVENOUS at 12:29

## 2018-11-13 NOTE — ED NOTES
Pt arrives via EMS from home  Pt has been weaker at home then normal  Also states even just pulling himself up in bed makes him SOB  Pt is very pale  Pt's pulse ox is staying around 90-92% with not a good pleth  Lungs are clear, when attempting to put on nasal cannula pt's pulse ox goes up to 98% on room air  Will continue to monitor pt's pulse ox    EKG Obtained  IV access and blood work obtained  Pt changed into gown  Call bell placed within reach               Valerie Frank RN  11/13/18 4360

## 2018-11-13 NOTE — TELEPHONE ENCOUNTER
Spoke to the patient's wife and he has been admitted to the hospital with a GI bleed  His hemoglobin is now 5 g and he was admitted for transfusions

## 2018-11-13 NOTE — ED PROVIDER NOTES
History  Chief Complaint   Patient presents with    Weakness - Generalized     pt presents to ED from home c/o weakness, and SOB on exertion     This is an 27-year-old male who presents with weakness fatigue shortness of breath increasing over the past week he did have some recent diarrhea no chest pain no fevers or chills no abdominal pain  Does not recall if his stools were melanotic or hematochezia        History provided by:  Patient and relative  Medical Problem   Location:  Generalized  Quality:  Weakness  Severity:  Severe  Duration:  1 week  Timing:  Constant  Progression:  Worsening  Chronicity:  New  Context:  Generalized weakness fatigue and shortness of breath increasing over the past week  Relieved by:  Nothing  Worsened by:  Exertion  Associated symptoms: diarrhea, fatigue and shortness of breath    Associated symptoms: no abdominal pain, no chest pain and no fever        Prior to Admission Medications   Prescriptions Last Dose Informant Patient Reported? Taking?   aspirin 81 mg chewable tablet   Yes Yes   Sig: Chew 81 mg daily   atorvastatin (LIPITOR) 10 mg tablet   Yes No   Sig: Take by mouth   ferrous sulfate (IRON SUPPLEMENT) 325 (65 Fe) mg tablet   Yes No   Sig: Take by mouth   furosemide (LASIX) 20 mg tablet   Yes No   Sig: Take by mouth   latanoprost (XALATAN) 0 005 % ophthalmic solution   Yes No   Si drop daily at bedtime   lisinopril (ZESTRIL) 5 mg tablet   Yes No   Sig: Take by mouth   metoprolol tartrate (LOPRESSOR) 25 mg tablet   Yes No   Sig: Take by mouth   potassium chloride (K-DUR,KLOR-CON) 20 mEq tablet   Yes No   Sig: Take by mouth   warfarin (COUMADIN) 5 mg tablet   Yes No   Sig: Take by mouth      Facility-Administered Medications: None       History reviewed  No pertinent past medical history      Past Surgical History:   Procedure Laterality Date    CORONARY ARTERY BYPASS GRAFT      MITRAL VALVE REPLACEMENT         Family History   Problem Relation Age of Onset    Alzheimer's disease Mother      I have reviewed and agree with the history as documented  Social History   Substance Use Topics    Smoking status: Former Smoker    Smokeless tobacco: Never Used    Alcohol use Yes      Comment: occasional        Review of Systems   Constitutional: Positive for fatigue  Negative for fever  Respiratory: Positive for shortness of breath  Cardiovascular: Negative for chest pain  Gastrointestinal: Positive for diarrhea  Negative for abdominal pain  Neurological: Positive for weakness  All other systems reviewed and are negative  Physical Exam  Physical Exam   Constitutional: He is oriented to person, place, and time  He appears well-developed and well-nourished  No distress  HENT:   Head: Normocephalic and atraumatic  Right Ear: External ear normal    Left Ear: External ear normal    Nose: Nose normal    Mouth/Throat: Oropharynx is clear and moist    Eyes: Pupils are equal, round, and reactive to light  EOM are normal    Neck: Neck supple  No tracheal deviation present  Cardiovascular: Normal rate  Murmur ( mechanical) heard  Irregular   Pulmonary/Chest: Breath sounds normal  No stridor  No respiratory distress  He has no wheezes  He has no rales  Abdominal: Soft  Bowel sounds are normal  He exhibits no distension  There is no tenderness  There is no guarding  Genitourinary: Rectal exam shows guaiac positive stool (Dark brown stool)  Musculoskeletal: Normal range of motion  He exhibits no edema, tenderness or deformity  Neurological: He is alert and oriented to person, place, and time  No cranial nerve deficit  Skin: Skin is warm and dry  He is not diaphoretic  There is pallor  Psychiatric: He has a normal mood and affect  His behavior is normal  Thought content normal    Nursing note and vitals reviewed        Vital Signs  ED Triage Vitals   Temperature Pulse Respirations Blood Pressure SpO2   11/13/18 0950 11/13/18 0945 11/13/18 0945 11/13/18 0945 11/13/18 0945   97 8 °F (36 6 °C) 63 19 145/68 (!) 82 %      Temp Source Heart Rate Source Patient Position - Orthostatic VS BP Location FiO2 (%)   11/13/18 0950 -- -- -- --   Temporal          Pain Score       --                  Vitals:    11/13/18 1045 11/13/18 1100 11/13/18 1115 11/13/18 1130   BP: 115/56 128/58 142/65    Pulse: 87 89 99 96       Visual Acuity      ED Medications  Medications   pantoprazole (PROTONIX) 80 mg in sodium chloride 0 9 % 100 mL IVPB (not administered)   pantoprazole (PROTONIX) 80 mg in sodium chloride 0 9 % 100 mL infusion (not administered)   phytonadione (AQUA-MEPHYTON) 10 mg/mL 10 mg in sodium chloride 0 9 % 50 mL IVPB (not administered)       Diagnostic Studies  Results Reviewed     Procedure Component Value Units Date/Time    Troponin I [[id number]]  (Abnormal) Collected:  11/13/18 1019    Lab Status:  Final result Specimen:  Blood from Arm, Right Updated:  11/13/18 1059     Troponin I 0 07 (H) ng/mL     Comprehensive metabolic panel [[id number]]  (Abnormal) Collected:  11/13/18 1019    Lab Status:  Final result Specimen:  Blood from Arm, Right Updated:  11/13/18 1057     Sodium 142 mmol/L      Potassium 4 1 mmol/L      Chloride 108 mmol/L      CO2 24 mmol/L      ANION GAP 10 mmol/L      BUN 34 (H) mg/dL      Creatinine 1 26 mg/dL      Glucose 120 mg/dL      Calcium 8 2 (L) mg/dL      AST 21 U/L      ALT 22 U/L      Alkaline Phosphatase 44 (L) U/L      Total Protein 6 3 (L) g/dL      Albumin 3 4 (L) g/dL      Total Bilirubin 0 40 mg/dL      eGFR 52 ml/min/1 73sq m     Narrative:         National Kidney Disease Education Program recommendations are as follows:  GFR calculation is accurate only with a steady state creatinine  Chronic Kidney disease less than 60 ml/min/1 73 sq  meters  Kidney failure less than 15 ml/min/1 73 sq  meters      APTT [[id number]]  (Abnormal) Collected:  11/13/18 1019    Lab Status:  Final result Specimen:  Blood from Arm, Right Updated:  11/13/18 1056 PTT 39 (H) seconds     Protime-INR [[id number]]  (Abnormal) Collected:  11/13/18 1019    Lab Status:  Final result Specimen:  Blood from Arm, Right Updated:  11/13/18 1055     Protime 40 7 (H) seconds      INR 4 54 (H)    CBC and differential [[id number]]  (Abnormal) Collected:  11/13/18 1019    Lab Status:  Final result Specimen:  Blood from Arm, Right Updated:  11/13/18 1052     WBC 6 05 Thousand/uL      RBC 1 93 (L) Million/uL      Hemoglobin 5 4 (LL) g/dL      Hematocrit 17 9 (L) %      MCV 93 fL      MCH 28 0 pg      MCHC 30 2 (L) g/dL      RDW 14 7 %      MPV 10 3 fL      Platelets 458 Thousands/uL      nRBC 0 /100 WBCs      Neutrophils Relative 60 %      Immat GRANS % 1 %      Lymphocytes Relative 23 %      Monocytes Relative 11 %      Eosinophils Relative 4 %      Basophils Relative 1 %      Neutrophils Absolute 3 74 Thousands/µL      Immature Grans Absolute 0 03 Thousand/uL      Lymphocytes Absolute 1 38 Thousands/µL      Monocytes Absolute 0 65 Thousand/µL      Eosinophils Absolute 0 22 Thousand/µL      Basophils Absolute 0 03 Thousands/µL     Narrative:        No Clots                 X-ray chest 2 views   Final Result by Bettye Forrester MD (11/13 1056)      No acute cardiopulmonary disease              Workstation performed: [id number]                    Procedures  ECG 12 Lead Documentation  Date/Time: 11/13/2018 10:00 AM  Performed by: Shena Cortes by: Rogelio Carney     ECG reviewed by me, the ED Provider: yes    Patient location:  ED  Rhythm:     Rhythm: atrial fibrillation    ST segments:     ST segments:  Non-specific  CriticalCare Time  Performed by: Rogelio Carney  Authorized by: Rogelio Carney     Critical care provider statement:     Critical care time (minutes):  30    Critical care time was exclusive of:  Separately billable procedures and treating other patients    Critical care was necessary to treat or prevent imminent or life-threatening deterioration of the following conditions: GI bleed      Critical care was time spent personally by me on the following activities:  Obtaining history from patient or surrogate, discussions with consultants, examination of patient, interpretation of cardiac output measurements, ordering and performing treatments and interventions and ordering and review of laboratory studies    I assumed direction of critical care for this patient from another provider in my specialty: no             Phone Contacts  ED Phone Contact    ED Course  ED Course as of Nov 13 1213   Tue Nov 13, 2018   1159 Discussed coagulopathy with Dr Michelle Hernandez at  this time since he is not briskly actively bleeding would just give vitamin K and hold off on FFP or K centra          HEART Risk Score      Most Recent Value   History  0 Filed at: 11/13/2018 1206   ECG  1 Filed at: 11/13/2018 1206   Age  2 Filed at: 11/13/2018 1206   Risk Factors  1 Filed at: 11/13/2018 1206   Troponin  1 Filed at: 11/13/2018 1206   Heart Score Risk Calculator   History  0 Filed at: 11/13/2018 1206   ECG  1 Filed at: 11/13/2018 1206   Age  2 Filed at: 11/13/2018 1206   Risk Factors  1 Filed at: 11/13/2018 1206   Troponin  1 Filed at: 11/13/2018 1206   HEART Score  5 Filed at: 11/13/2018 1206   HEART Score  5 Filed at: 11/13/2018 1206                            MDM  Number of Diagnoses or Management Options  Elevated troponin:   GI bleed:   Severe anemia:   Diagnosis management comments: Generalized weakness differential includes infection/sepsis electrolyte abnormality severe anemia or GI bleed labs and EKG ordered       Amount and/or Complexity of Data Reviewed  Clinical lab tests: ordered      CritCare Time    Disposition  Final diagnoses:   GI bleed   Severe anemia   Elevated troponin     Time reflects when diagnosis was documented in both MDM as applicable and the Disposition within this note     Time User Action Codes Description Comment    11/13/2018 12:00 PM Ellen Lu Add [K92 2] GI bleed 11/13/2018 12:01 PM Cl Bell Add [D64 9] Severe anemia     11/13/2018 12:01 PM Cl Bell Add [R74 8] Elevated troponin       ED Disposition     ED Disposition Condition Comment    Admit  Case was discussed with *Dr Clary Cartwright** and the patient's admission status was agreed to be Admission Status: inpatient status to the service of Dr Clary Cartwright   Follow-up Information    None         Patient's Medications   Discharge Prescriptions    No medications on file     No discharge procedures on file      ED Provider  Electronically Signed by           Harinder Sawyer DO  11/13/18 4888

## 2018-11-13 NOTE — ASSESSMENT & PLAN NOTE
-likely due to upper GI bleeding due to Coumadin coagulopathy  -transfuse 2 units of packed red blood cells  -IV fluids for support  -vitamin K has been given    We will allow the INR to drift down with a history of mechanical mitral valve replacement  -hold Coumadin and aspirin  -EGD tomorrow, discussed with GI

## 2018-11-13 NOTE — ASSESSMENT & PLAN NOTE
-continue beta-blocker, statin  -hold ACE-inhibitor for now is creatinine is 1 26 which gives a creatinine clearance that is borderline for this patient's age    If creatinine room and blood pressure remained stable overnight will resume the patient's ACE-inhibitor tomorrow morning   -no aspirin or Coumadin with GI bleeding  -no Lasix with GI bleeding

## 2018-11-13 NOTE — H&P
H&P- Tanya Mercado 1935, 80 y o  male MRN: [id number]    Unit/Bed#: -02 Encounter: [id number]    Primary Care Provider: Tessa Ulloa PA-C   Date and time admitted to hospital: 11/13/2018  9:44 AM        Coagulopathy (HCC)   Assessment & Plan    -Coumadin coagulopathy causing GI bleeding, likely upper GI bleeding  -hold Coumadin     Hypertension   Assessment & Plan    -continue beta-blocker  -hold ACE-inhibitor for now and follow blood pressure and creatinine trend     Arteriosclerotic cardiovascular disease   Assessment & Plan    -continue beta-blocker, statin  -hold ACE-inhibitor for now is creatinine is 1 26 which gives a creatinine clearance that is borderline for this patient's age  If creatinine room and blood pressure remained stable overnight will resume the patient's ACE-inhibitor tomorrow morning   -no aspirin or Coumadin with GI bleeding  -no Lasix with GI bleeding     * Acute blood loss anemia   Assessment & Plan    -likely due to upper GI bleeding due to Coumadin coagulopathy  -transfuse 2 units of packed red blood cells  -IV fluids for support  -vitamin K has been given  We will allow the INR to drift down with a history of mechanical mitral valve replacement  -hold Coumadin and aspirin  -EGD tomorrow, discussed with GI       VTE Prophylaxis: Pharmacologic VTE Prophylaxis contraindicated due to GI bleed  / sequential compression device   Code Status:  Full  POLST: POLST is not applicable to this patient    Anticipated Length of Stay:  Patient will be admitted on an Inpatient basis with an anticipated length of stay of  > 2 midnights  Justification for Hospital Stay:  GI bleed    Total Time for Visit, including Counseling / Coordination of Care: 45 minutes  Greater than 50% of this total time spent on direct patient counseling and coordination of care      Chief Complaint:   Weakness and shortness of breath    History of Present Illness:    Tanya Mercado is a 80 y o  male who presents with chief complaints of weakness and shortness of breath  The patient reports that 9 days ago he began having weakness and shortness of breath that has progressively worsened  He presented to the ER because he was too weak to ambulate  He reports having 1 episode of diarrhea approximately 9 days ago but did not notice black stool  He denies any black or bloody stool  He denies any chest pain or lightheadedness  He denies other complaints  Patient denies palpitations, cough, nausea, vomiting, abdominal pain, fevers, chills, night sweats, headache, visual disturbances, neck stiffness, new focal neurologic deficit, rash, dysuria, heat or cold intolerance, significant weight gain or loss  Review of Systems:    Review of Systems   Neurological: Negative for seizures ( jmkp)  All other systems reviewed and are negative  -TYPO above due to computer ERROR  Should read: I have reviewed all of the other systems and they are all negative except as per the HPI  Past Medical and Surgical History:     History reviewed  No pertinent past medical history  Past Surgical History:   Procedure Laterality Date    CORONARY ARTERY BYPASS GRAFT      MITRAL VALVE REPLACEMENT         Meds/Allergies:    Prior to Admission medications    Medication Sig Start Date End Date Taking?  Authorizing Provider   aspirin 81 mg chewable tablet Chew 81 mg daily   Yes Historical Provider, MD   atorvastatin (LIPITOR) 10 mg tablet Take by mouth 7/27/10   Historical Provider, MD   ferrous sulfate (IRON SUPPLEMENT) 325 (65 Fe) mg tablet Take by mouth    Historical Provider, MD   furosemide (LASIX) 20 mg tablet Take by mouth 7/27/10   Historical Provider, MD   latanoprost (XALATAN) 0 005 % ophthalmic solution Administer 1 drop to both eyes daily at bedtime      Historical Provider, MD   lisinopril (ZESTRIL) 5 mg tablet Take by mouth 5/19/11   Historical Provider, MD   metoprolol tartrate (LOPRESSOR) 25 mg tablet Take by mouth 1/25/11   Historical Provider, MD   potassium chloride (K-DUR,KLOR-CON) 20 mEq tablet Take by mouth 7/12/11   Historical Provider, MD   warfarin (COUMADIN) 5 mg tablet Take by mouth 10/10/11   Historical Provider, MD     I have reviewed home medications with patient personally  Allergies: No Known Allergies    Social History:     Marital Status: /Civil Union   Substance Use History:   History   Alcohol Use    Yes     Comment: occasional     History   Smoking Status    Former Smoker   Smokeless Tobacco    Never Used     History   Drug Use No       Family History:    non-contributory    Physical Exam:     Vitals:   Blood Pressure: 142/67 (11/13/18 1415)  Pulse: 88 (11/13/18 1415)  Temperature: 98 5 °F (36 9 °C) (11/13/18 1415)  Temp Source: Oral (11/13/18 1415)  Respirations: 15 (11/13/18 1415)  Height: 5' 11" (180 3 cm) (11/13/18 1345)  Weight - Scale: 86 6 kg (190 lb 14 7 oz) (11/13/18 1345)  SpO2: 100 % (11/13/18 1415)    Physical Exam    Gen: NAD, AAOx3, well developed, well nourished  Eyes: EOMI, PERRLA, no scleral icterus, pale conjunctiva  ENMT:  Oropharynx clear of erythema or exudates, no nasal discharge, no otic discharge, moist mucous membranes  Neck:  Supple  Lymph:  No anterior or posterior cervical or supraclavicular lymphadenopathy  Cardiovascular:  Regular rate and rhythm, normal S1-S2, no murmurs, rubs, or gallops  Lungs:  Clear to auscultation bilaterally, no wheezes, or rales, or rhonchi  Abdomen:  Positive bowel sounds, soft, nontender, nondistended, no palpable organomegaly   Skin:  Intact, no obvious lesions or rashes, no edema  Neuro: Cranial nerves 2-12 are intact, non-focal, 5/5 strength in all 4 extremities    Additional Data:     Lab Results: I have personally reviewed pertinent reports          Results from last 7 days  Lab Units 11/13/18  1019   WBC Thousand/uL 6 05   HEMOGLOBIN g/dL 5 4*   HEMATOCRIT % 17 9*   PLATELETS Thousands/uL 279   NEUTROS ABS Thousands/µL 3 74 NEUTROS PCT % 60   LYMPHS PCT % 23   MONOS PCT % 11   EOS PCT % 4       Results from last 7 days  Lab Units 11/13/18  1019   POTASSIUM mmol/L 4 1   CHLORIDE mmol/L 108   CO2 mmol/L 24   BUN mg/dL 34*   CREATININE mg/dL 1 26   ANION GAP mmol/L 10   CALCIUM mg/dL 8 2*   ALBUMIN g/dL 3 4*   TOTAL BILIRUBIN mg/dL 0 40   ALK PHOS U/L 44*   ALT U/L 22   AST U/L 21       Results from last 7 days  Lab Units 11/13/18  1019   INR  4 54*                   Imaging: I have personally reviewed pertinent reports  X-ray chest 2 views   Final Result by Julia Ramos MD (11/13 1056)      No acute cardiopulmonary disease  Workstation performed: [id number]             [facility] Records Reviewed: Yes     ** Please Note: This note has been constructed using a voice recognition system   **

## 2018-11-13 NOTE — TELEPHONE ENCOUNTER
LANCE BROOKS CALLED AND STATED THAT HER  WAS TAKEN BY AMBULANCE TO [facility] THIS MORNING  HE WAS ADMITTED FOR BALANCE AND DIZZINESS ISSUES  SHE ASKED TO SPEAK WITH YOU AND I TOLD HER YOU WERE IN YOUR MEETING  PLEASE GIVE HER A CALL WHEN YOU CAN  THANK YOU

## 2018-11-14 ENCOUNTER — ANESTHESIA EVENT (INPATIENT)
Dept: PERIOP | Facility: HOSPITAL | Age: 83
DRG: 813 | End: 2018-11-14
Payer: MEDICARE

## 2018-11-14 ENCOUNTER — APPOINTMENT (INPATIENT)
Dept: NON INVASIVE DIAGNOSTICS | Facility: HOSPITAL | Age: 83
DRG: 813 | End: 2018-11-14
Payer: MEDICARE

## 2018-11-14 ENCOUNTER — ANESTHESIA (INPATIENT)
Dept: PERIOP | Facility: HOSPITAL | Age: 83
DRG: 813 | End: 2018-11-14
Payer: MEDICARE

## 2018-11-14 PROBLEM — I48.0 PAROXYSMAL ATRIAL FIBRILLATION (HCC): Status: ACTIVE | Noted: 2018-11-14

## 2018-11-14 LAB
BASOPHILS # BLD AUTO: 0.02 THOUSANDS/ΜL (ref 0–0.1)
BASOPHILS NFR BLD AUTO: 0 % (ref 0–1)
EOSINOPHIL # BLD AUTO: 0.19 THOUSAND/ΜL (ref 0–0.61)
EOSINOPHIL NFR BLD AUTO: 4 % (ref 0–6)
ERYTHROCYTE [DISTWIDTH] IN BLOOD BY AUTOMATED COUNT: 15 % (ref 11.6–15.1)
HCT VFR BLD AUTO: 25.9 % (ref 36.5–49.3)
HGB BLD-MCNC: 7.1 G/DL (ref 12–17)
HGB BLD-MCNC: 7.4 G/DL (ref 12–17)
HGB BLD-MCNC: 7.4 G/DL (ref 12–17)
HGB BLD-MCNC: 7.8 G/DL (ref 12–17)
IMM GRANULOCYTES # BLD AUTO: 0.02 THOUSAND/UL (ref 0–0.2)
IMM GRANULOCYTES NFR BLD AUTO: 0 % (ref 0–2)
INR PPP: 1.69 (ref 0.86–1.17)
LYMPHOCYTES # BLD AUTO: 1.02 THOUSANDS/ΜL (ref 0.6–4.47)
LYMPHOCYTES NFR BLD AUTO: 19 % (ref 14–44)
MCH RBC QN AUTO: 26.9 PG (ref 26.8–34.3)
MCHC RBC AUTO-ENTMCNC: 30.1 G/DL (ref 31.4–37.4)
MCV RBC AUTO: 89 FL (ref 82–98)
MONOCYTES # BLD AUTO: 0.53 THOUSAND/ΜL (ref 0.17–1.22)
MONOCYTES NFR BLD AUTO: 10 % (ref 4–12)
NEUTROPHILS # BLD AUTO: 3.58 THOUSANDS/ΜL (ref 1.85–7.62)
NEUTS SEG NFR BLD AUTO: 67 % (ref 43–75)
PLATELET # BLD AUTO: 183 THOUSANDS/UL (ref 149–390)
PMV BLD AUTO: 9.6 FL (ref 8.9–12.7)
PROTHROMBIN TIME: 18.9 SECONDS (ref 11.8–14.2)
RBC # BLD AUTO: 2.9 MILLION/UL (ref 3.88–5.62)
WBC # BLD AUTO: 5.36 THOUSAND/UL (ref 4.31–10.16)

## 2018-11-14 PROCEDURE — 97163 PT EVAL HIGH COMPLEX 45 MIN: CPT

## 2018-11-14 PROCEDURE — 0DB98ZX EXCISION OF DUODENUM, VIA NATURAL OR ARTIFICIAL OPENING ENDOSCOPIC, DIAGNOSTIC: ICD-10-PCS | Performed by: INTERNAL MEDICINE

## 2018-11-14 PROCEDURE — G8978 MOBILITY CURRENT STATUS: HCPCS

## 2018-11-14 PROCEDURE — C9113 INJ PANTOPRAZOLE SODIUM, VIA: HCPCS | Performed by: HOSPITALIST

## 2018-11-14 PROCEDURE — G8979 MOBILITY GOAL STATUS: HCPCS

## 2018-11-14 PROCEDURE — 85018 HEMOGLOBIN: CPT | Performed by: HOSPITALIST

## 2018-11-14 PROCEDURE — 88305 TISSUE EXAM BY PATHOLOGIST: CPT | Performed by: PATHOLOGY

## 2018-11-14 PROCEDURE — 99233 SBSQ HOSP IP/OBS HIGH 50: CPT | Performed by: HOSPITALIST

## 2018-11-14 PROCEDURE — 85025 COMPLETE CBC W/AUTO DIFF WBC: CPT | Performed by: INTERNAL MEDICINE

## 2018-11-14 PROCEDURE — 85610 PROTHROMBIN TIME: CPT | Performed by: HOSPITALIST

## 2018-11-14 RX ORDER — PROPOFOL 10 MG/ML
INJECTION, EMULSION INTRAVENOUS AS NEEDED
Status: DISCONTINUED | OUTPATIENT
Start: 2018-11-14 | End: 2018-11-14 | Stop reason: SURG

## 2018-11-14 RX ORDER — ONDANSETRON 2 MG/ML
4 INJECTION INTRAMUSCULAR; INTRAVENOUS ONCE AS NEEDED
Status: DISCONTINUED | OUTPATIENT
Start: 2018-11-14 | End: 2018-11-14 | Stop reason: HOSPADM

## 2018-11-14 RX ADMIN — LATANOPROST 1 DROP: 50 SOLUTION/ DROPS OPHTHALMIC at 21:32

## 2018-11-14 RX ADMIN — ATORVASTATIN CALCIUM 10 MG: 10 TABLET, FILM COATED ORAL at 16:08

## 2018-11-14 RX ADMIN — METOPROLOL TARTRATE 25 MG: 25 TABLET ORAL at 08:02

## 2018-11-14 RX ADMIN — SODIUM CHLORIDE 100 ML/HR: 0.9 INJECTION, SOLUTION INTRAVENOUS at 07:55

## 2018-11-14 RX ADMIN — SODIUM CHLORIDE 8 MG/HR: 9 INJECTION, SOLUTION INTRAVENOUS at 09:16

## 2018-11-14 RX ADMIN — PROPOFOL 50 MG: 10 INJECTION, EMULSION INTRAVENOUS at 10:51

## 2018-11-14 RX ADMIN — METOPROLOL TARTRATE 25 MG: 25 TABLET ORAL at 21:32

## 2018-11-14 RX ADMIN — POLYETHYLENE GLYCOL 3350, SODIUM SULFATE ANHYDROUS, SODIUM BICARBONATE, SODIUM CHLORIDE, POTASSIUM CHLORIDE 4000 ML: 236; 22.74; 6.74; 5.86; 2.97 POWDER, FOR SOLUTION ORAL at 14:45

## 2018-11-14 RX ADMIN — PROPOFOL 100 MG: 10 INJECTION, EMULSION INTRAVENOUS at 10:45

## 2018-11-14 NOTE — PROGRESS NOTES
Spoke with Steff Rose  [facility]BG 7 1  She did not want third unit of RBC's at this time  NSS continues to infuse at 100ml/hr

## 2018-11-14 NOTE — PHYSICAL THERAPY NOTE
PT eval   11/14/18 1535   Note Type   Note type Eval only   Pain Assessment   Pain Assessment No/denies pain   Pain Score No Pain   Home Living   Type of Home House   Home Equipment Quad cane   Additional Comments just past few days pta otherwise ind no device   Prior Function   Level of Halfway Independent with ADLs and functional mobility   Lives With Spouse   Receives Help From Family   ADL Assistance Independent   IADLs Independent   Falls in the last 6 months 0   Vocational Retired   Restrictions/Precautions   Other Precautions Telemetry;Multiple lines   General   Additional Pertinent History adm with acute blood loss anemia, rec'd blodd transfusions, undergoing w/o for G bleed   Family/Caregiver Present No   Cognition   Overall Cognitive Status WFL   Arousal/Participation Alert   Orientation Level Oriented X4   Memory Within functional limits   Following Commands Follows all commands and directions without difficulty   RUE Assessment   RUE Assessment WFL   LUE Assessment   LUE Assessment WFL   RLE Assessment   RLE Assessment WFL   LLE Assessment   LLE Assessment WFL   Coordination   Movements are Fluid and Coordinated 1   Proprioception   RLE Proprioception Grossly intact   LLE Proprioception Grossly Intact   Bed Mobility   Rolling R 7  Independent   Rolling L 7  Independent   Supine to Sit 7  Independent   Sit to Supine 7  Independent   Transfers   Sit to Stand 5  Supervision   Stand to Sit 5  Supervision   Stand pivot 5  Supervision   Ambulation/Elevation   Gait pattern (unable to assess due to lines etc)   Balance   Static Sitting Normal   Dynamic Sitting Normal   Static Standing Good   Dynamic Standing Good   Endurance Deficit   Endurance Deficit No   Activity Tolerance   Activity Tolerance Patient tolerated treatment well   Nurse Made Aware yes   Assessment   Prognosis Good   Problem List Decreased mobility   Assessment Pt seen at bedside, perform funcitonal transfers to and form commode wihtout device  Unable to further assess gait due to lines andbowel prep for colonoscopy  will recheck after studies completed to ensure safe ind ambulation for home d/c    Barriers to Discharge (medical status)   Goals   Patient Goals find out the problem   STG Expiration Date 11/20/18   Short Term Goal #1 1) safe ind trnasfers 2) safe ind amb with appropriate ' level and up/down 5 steps   Plan   Treatment/Interventions ADL retraining;Functional transfer training;LE strengthening/ROM; Therapeutic exercise; Endurance training;Gait training;Spoke to nursing   PT Frequency 2-3x/wk   Recommendation   Recommendation Home with family support   PT - OK to Discharge No   Barthel Index   Feeding 10   Bathing 5   Grooming Score 5   Dressing Score 5   Bladder Score 10   Bowels Score 10   Toilet Use Score 5   Transfers (Bed/Chair) Score 10   Mobility (Level Surface) Score 0   Stairs Score 0   Barthel Index Score 60   Shalonda Barone, PT

## 2018-11-14 NOTE — ASSESSMENT & PLAN NOTE
-likely due to upper GI bleeding due to Coumadin coagulopathy  -s/p 2 units of packed red blood cells, Hb now 7 8 from 5 4  -IV fluids for support  -vitamin K was given in the ER and the patient's INR is 1 69  -hold Coumadin and aspirin  -EGD today, discussed with GI

## 2018-11-14 NOTE — PROGRESS NOTES
Progress Note - Angélica Wu 1935, 80 y o  male MRN: [id number]    Unit/Bed#: -02 Encounter: [id number]    Primary Care Provider: Alberto Gardner PA-C   Date and time admitted to hospital: 11/13/2018  9:44 AM        Paroxysmal atrial fibrillation (HCC)   Assessment & Plan    -hold Coumadin with GI bleeding  -continue beta-blocker  -rate controlled  -check echocardiogram     Coagulopathy (HCC)   Assessment & Plan    -Coumadin coagulopathy causing GI bleeding, likely upper GI bleeding  -hold Coumadin     Hypertension   Assessment & Plan    -continue beta-blocker  -hold ACE-inhibitor for now and follow blood pressure and creatinine trend     Arteriosclerotic cardiovascular disease   Assessment & Plan    -continue beta-blocker, statin  -continue to hold ACE-inhibitor  Check creatinine in the morning  Blood pressure currently stable   -no aspirin or Coumadin with GI bleeding  -no Lasix with GI bleeding     * Acute blood loss anemia   Assessment & Plan    -likely due to upper GI bleeding due to Coumadin coagulopathy  -s/p 2 units of packed red blood cells, Hb now 7 8 from 5 4  -IV fluids for support  -vitamin K was given in the ER and the patient's INR is 1 69  -hold Coumadin and aspirin  -EGD today, discussed with GI       VTE Pharmacologic Prophylaxis:   Pharmacologic: Pharmacologic VTE Prophylaxis contraindicated due to GI bleeding  Mechanical VTE Prophylaxis in Place: Yes    Patient Centered Rounds: I have performed bedside rounds with nursing staff today  Education and Discussions with Family / Patient:  Patient    Time Spent for Care: 20 minutes  More than 50% of total time spent on counseling and coordination of care as described above      Current Length of Stay: 1 day(s)    Current Patient Status: Inpatient   Certification Statement: The patient will continue to require additional inpatient hospital stay due to GI bleed    Discharge Plan:  Hopefully 1-2 days    Code Status: No Order      Subjective:   Patient without new complaints  Objective:     Vitals:   Temp (24hrs), Av 5 °F (36 9 °C), Min:97 6 °F (36 4 °C), Max:99 1 °F (37 3 °C)    Temp:  [97 6 °F (36 4 °C)-99 1 °F (37 3 °C)] 98 2 °F (36 8 °C)  HR:  [] 87  Resp:  [14-46] 20  BP: ()/(52-81) 143/63  SpO2:  [92 %-100 %] 96 %  Body mass index is 26 35 kg/m²  Input and Output Summary (last 24 hours):        Intake/Output Summary (Last 24 hours) at 18 0856  Last data filed at 18 0755   Gross per 24 hour   Intake             1715 ml   Output              475 ml   Net             1240 ml       Physical Exam:     Physical Exam  Gen: NAD, AAOx3, well developed, well nourished  Eyes: EOMI, PERRLA, no scleral icterus, pale conjunctiva  ENMT:  Oropharynx clear of erythema or exudates, no nasal discharge, no otic discharge, moist mucous membranes  Neck:  Supple  Cardiovascular:   normal rate, irregularly irregular rhythm, normal S1-S2, no murmurs, rubs, or gallops  Lungs:  Clear to auscultation bilaterally, no wheezes, or rales, or rhonchi  Abdomen:  Positive bowel sounds, soft, nontender, nondistended, no palpable organomegaly   Skin:  Intact, no obvious lesions or rashes, no edema  Neuro: Cranial nerves 2-12 are intact, non-focal, 5/5 strength in all 4 extremities    Additional Data:     Labs:      Results from last 7 days  Lab Units 18  0436   WBC Thousand/uL 5 36   HEMOGLOBIN g/dL 7 8*   HEMATOCRIT % 25 9*   PLATELETS Thousands/uL 183   NEUTROS PCT % 67   LYMPHS PCT % 19   MONOS PCT % 10   EOS PCT % 4       Results from last 7 days  Lab Units 18  1019   POTASSIUM mmol/L 4 1   CHLORIDE mmol/L 108   CO2 mmol/L 24   BUN mg/dL 34*   CREATININE mg/dL 1 26   ANION GAP mmol/L 10   CALCIUM mg/dL 8 2*   ALBUMIN g/dL 3 4*   TOTAL BILIRUBIN mg/dL 0 40   ALK PHOS U/L 44*   ALT U/L 22   AST U/L 21       Results from last 7 days  Lab Units 18  0436   INR  1 69*                       * I Have Reviewed All Lab Data Listed Above  * Additional Pertinent Lab Tests Reviewed: [address] Admission Reviewed    Recent Cultures (last 7 days):           Last 24 Hours Medication List:     Current Facility-Administered Medications:  atorvastatin 10 mg Oral Daily With Delaney Valle MD    ferrous sulfate 325 mg Oral Daily With Breakfast Niranjan Martins MD    latanoprost 1 drop Both Eyes HS Niranjan Martins MD    metoprolol tartrate 25 mg Oral Q12H [facility] Niranjan Martins MD    pantoprozole (PROTONIX) infusion (Continuous) 8 mg/hr Intravenous Continuous Niranjan Martins MD Last Rate: 8 mg/hr (11/13/18 9888)   sodium chloride 100 mL/hr Intravenous Continuous Niranjan Martins MD Last Rate: 100 mL/hr (11/14/18 1209)        Today, Patient Was Seen By: Niranjan Martins MD    ** Please Note: Dictation voice to text software may have been used in the creation of this document   **

## 2018-11-14 NOTE — PHYSICIAN ADVISOR
Current patient class: Inpatient  The patient is currently on Hospital Day: 2 at [place] [address]      The patient was admitted to the hospital at [phone] on 11/13/18 for the following diagnosis:  Weakness [R53 1]  GI bleed [K92 2]  Elevated troponin [R74 8]  Severe anemia [D64 9]       There is documentation in the medical record of an expected length of stay of at least 2 midnights  The patient is therefore expected to satisfy the 2 midnight benchmark and given the 2 midnight presumption is appropriate for INPATIENT ADMISSION  Given this expectation of a satisfying stay, CMS instructs us that the patient is most often appropriate for inpatient admission under part A provided medical necessity is documented in the chart  After review of the relevant documentation, labs, vital signs and test results, the patient is appropriate for INPATIENT ADMISSION  Admission to the hospital as an inpatient is a complex decision making process which requires the practitioner to consider the patients presenting complaint, history and physical examination and all relevant testing  With this in mind, in this case, the patient was deemed appropriate for INPATIENT ADMISSION  After review of the documentation and testing available at the time of the admission I concur with this clinical determination of medical necessity  Rationale is as follows: The patient is an 69-year-old male who presented to the hospital on November 13, 2018  He is admitted as an inpatient  He has underlying paroxysmal atrial fibrillation with Coumadin coagulopathy likely causing upper gastrointestinal bleeding with acute blood loss anemia  He required 2 units of PRBCs so far along with vitamin K  he is expected to undergo EGD today  Inpatient level care remains appropriate based on anticipated length of stay along with medical necessity and severity of illness upon presentation      The patients vitals on arrival were ED Triage Vitals   Temperature Pulse Respirations Blood Pressure SpO2   11/13/18 0950 11/13/18 0945 11/13/18 0945 11/13/18 0945 11/13/18 0945   97 8 °F (36 6 °C) 63 19 145/68 92 %      Temp Source Heart Rate Source Patient Position - Orthostatic VS BP Location FiO2 (%)   11/13/18 0950 11/13/18 1324 11/13/18 1324 11/13/18 1324 --   Temporal Monitor Lying Left arm       Pain Score       11/13/18 1345       No Pain           History reviewed  No pertinent past medical history  Past Surgical History:   Procedure Laterality Date    CORONARY ARTERY BYPASS GRAFT      MITRAL VALVE REPLACEMENT             Consults have been placed to:   IP CONSULT TO GASTROENTEROLOGY    Vitals:    11/14/18 0300 11/14/18 0610 11/14/18 0757 11/14/18 0947   BP: 98/52 108/54 143/63 134/60   BP Location: Right arm  Right arm    Pulse: 73 79 87 68   Resp: 20 18 20 20   Temp: 98 2 °F (36 8 °C) 98 2 °F (36 8 °C)  99 2 °F (37 3 °C)   TempSrc: Oral   Oral   SpO2: 97% 96% 96% 99%   Weight:  85 7 kg (188 lb 15 oz)     Height:           Most recent labs:    Recent Labs      11/13/18   1019   11/14/18   0436   WBC  6 05   --   5 36   HGB  5 4*   < >  7 8*   HCT  17 9*   --   25 9*   PLT  279   --   183   K  4 1   --    --    CALCIUM  8 2*   --    --    BUN  34*   --    --    CREATININE  1 26   --    --    INR  4 54*   --   1 69*   TROPONINI  0 07*   --    --    AST  21   --    --    ALT  22   --    --    ALKPHOS  44*   --    --     < > = values in this interval not displayed         Scheduled Meds:  Current Facility-Administered Medications:  [MAR Hold] atorvastatin 10 mg Oral Daily With Roslyn Vargas MD    [MAR Hold] ferrous sulfate 325 mg Oral Daily With Breakfast Lawanda Peacock MD    [facility] Hold] latanoprost 1 drop Both Eyes HS Lawanda Peacock MD    [facility] Hold] metoprolol tartrate 25 mg Oral Q12H [facility] Lawanda Peacock MD    pantoprozole (PROTONIX) infusion (Continuous) 8 mg/hr Intravenous Continuous Lawanda Peacock MD Last Rate: 8 mg/hr (11/14/18 6947) sodium chloride 100 mL/hr Intravenous Continuous Mac Herrera MD Last Rate: 100 mL/hr (11/14/18 0755)     Continuous Infusions:  pantoprozole (PROTONIX) infusion (Continuous) 8 mg/hr Last Rate: 8 mg/hr (11/14/18 9922)   sodium chloride 100 mL/hr Last Rate: 100 mL/hr (11/14/18 0755)     PRN Meds:      Surgical procedures (if appropriate):  Procedure(s):  ESOPHAGOGASTRODUODENOSCOPY (EGD)

## 2018-11-14 NOTE — UTILIZATION REVIEW
Initial Clinical Review    Admission: Date/Time/Statement:INPT 11/13/18 @ 1203     Orders Placed This Encounter   Procedures    Inpatient Admission (expected length of stay for this patient is greater than two midnights)     Standing Status:   Standing     Number of Occurrences:   1     Order Specific Question:   Admitting Physician     Answer:   Reji Mckee [[id number]]     Order Specific Question:   Level of Care     Answer:   Level 1 Stepdown [13]     Order Specific Question:   Estimated length of stay     Answer:   More than 2 Midnights     Order Specific Question:   Certification     Answer:   I certify that inpatient services are medically necessary for this patient for a duration of greater than two midnights  See H&P and MD Progress Notes for additional information about the patient's course of treatment  ED: Date/Time/Mode of Arrival:   ED Arrival Information     Expected Arrival Acuity Means of Arrival Escorted By Service Admission Type    - 11/13/2018 09:41 Urgent Ambulance [facility]) General Medicine Urgent    Arrival Complaint    Weakness          Chief Complaint:   Chief Complaint   Patient presents with    Weakness - Generalized     pt presents to ED from home c/o weakness, and SOB on exertion       History of Illness: 80 y o  male who presents with chief complaints of weakness and shortness of breath  The patient reports that 9 days ago he began having weakness and shortness of breath that has progressively worsened  He presented to the ER because he was too weak to ambulate  He reports having 1 episode of diarrhea approximately 9 days ago but did not notice black stool      ED Vital Signs:   ED Triage Vitals   Temperature Pulse Respirations Blood Pressure SpO2   11/13/18 0950 11/13/18 0945 11/13/18 0945 11/13/18 0945 11/13/18 0945   97 8 °F (36 6 °C) 63 19 145/68 92 %      Temp Source Heart Rate Source Patient Position - Orthostatic VS BP Location FiO2 (%)   11/13/18 0950 11/13/18 1324 11/13/18 1324 11/13/18 1324 --   Temporal Monitor Lying Left arm       Pain Score       11/13/18 1345       No Pain        Wt Readings from Last 1 Encounters:   11/14/18 85 7 kg (188 lb 15 oz)       Vital Signs (abnormal):   11/13/18 1215  --  88   25  119/56  --  94 %  --  --   11/13/18 1200  --  97   25  118/57  --  99 %  --  --   11/13/18 1145  --  92  22  115/59  --  96 %  --  --   11/13/18 1130  --  96  17  131/63  --  100 %  --  --   11/13/18 1115  --  99  17  142/65  --  95 %  --  --   11/13/18 1100  --  89  16  128/58  --  --  --  --   11/13/18 1045  --  87  16  115/56  --  --  --  --   11/13/18 1015  --  75  19  115/53  --  100 %  --  --   11/13/18 1000  --  79  18  116/55  --  100 %  --  --   11/13/18 0950  97 8 °F (36 6 °C)  --  --  --  --  --  --  --   11/13/18 0945  --  63  19  145/68  --  92 %  --  --         Abnormal Labs/Diagnostic Test Results:   hgb 5 4  hct 17 9  Bun 34  Ca 8 2  Alb 3 4  Alk phos 44  inr 4 54    CXR:wnl    ED Treatment:   Medication Administration from 11/13/2018 0941 to 11/13/2018 1340       Date/Time Order Dose Route Action Action by Comments     11/13/2018 1249 pantoprazole (PROTONIX) 80 mg in sodium chloride 0 9 % 100 mL IVPB 0 mg Intravenous Stopped Valerie Frank RN      11/13/2018 1229 pantoprazole (PROTONIX) 80 mg in sodium chloride 0 9 % 100 mL IVPB 80 mg Intravenous [address] Valerie Frank, MANDA      11/13/2018 1303 pantoprazole (PROTONIX) 80 mg in sodium chloride 0 9 % 100 mL infusion 8 mg/hr Intravenous [address] Valerie Frank RN      11/13/2018 1200 phytonadione (AQUA-MEPHYTON) 10 mg/mL SC/IM injection 10 mg   Subcutaneous Canceled Entry Valerie Frank, RN      11/13/2018 1303 phytonadione (AQUA-MEPHYTON) 10 mg/mL 10 mg in sodium chloride 0 9 % 50 mL IVPB 0 mg Intravenous Stopped Valerie Frank, RN      11/13/2018 1234 phytonadione (AQUA-MEPHYTON) 10 mg/mL 10 mg in sodium chloride 0 9 % 50 mL IVPB 10 mg Intravenous New Bag Valerie Frank, RN Past Medical/Surgical History: Active Ambulatory Problems     Diagnosis Date Noted    Anemia 08/01/2012    Anxiety 12/01/2012    Arteriosclerotic cardiovascular disease 08/01/2012    Arthritis 12/01/2012    Herpes zoster 02/12/2015    Hyperglycemia 03/29/2016    Hyperlipidemia 08/01/2012    Hypertension 08/01/2012    Hypocalcemia 03/29/2016    Insomnia 08/05/2013     Resolved Ambulatory Problems     Diagnosis Date Noted    No Resolved Ambulatory Problems     No Additional Past Medical History       Admitting Diagnosis: Weakness [R53 1]  GI bleed [K92 2]  Elevated troponin [R74 8]  Severe anemia [D64 9]    Age/Sex: 80 y o  male    Assessment/Plan: 79 yo male C/O SOB, weakness with coumadin coagulopathy, causing upper GI bleed, hold coumadin  HTN cont beta blocker, hold ACE, follow bp and creat  ACD, cont beta blocker, statin, hold ACE, creat 1 26  Acute blood loss anemia d/t upper GI bleed, PRBC 2 units, [email or web address], vit k, hold coumadin, asa, EGD tomorrow, GI consult  Anticipated Length of Stay:  Patient will be admitted on an Inpatient basis with an anticipated length of stay of  > 2 midnights     Justification for Hospital Stay:  GI bleed    Admission Orders:  INPT  TELE  PT/OT  PRBC 3 UNITS  NPO  BEDREST  CONSULT GASTROENTEROLOGY  O2  Scheduled Meds:   Current Facility-Administered Medications:  atorvastatin 10 mg Oral Daily With Maria MD Josue    ferrous sulfate 325 mg Oral Daily With Breakfast Betty Chavez MD    latanoprost 1 drop Both Eyes HS Betty Chavez MD    metoprolol tartrate 25 mg Oral Q12H [address] Betty Chavez MD    pantoprozole (PROTONIX) infusion (Continuous) 8 mg/hr Intravenous Continuous Betty Chavez MD Last Rate: 8 mg/hr (11/14/18 0416)   sodium chloride 100 mL/hr Intravenous Continuous Betty Chavez MD Last Rate: 100 mL/hr (11/14/18 5546)     Continuous Infusions:   pantoprozole (PROTONIX) infusion (Continuous) 8 mg/hr Last Rate: 8 mg/hr (11/14/18 0916) sodium chloride 100 mL/hr Last Rate: 100 mL/hr (11/14/18 0755)     PRN Meds:

## 2018-11-14 NOTE — ANESTHESIA PREPROCEDURE EVALUATION
Review of Systems/Medical History  Patient summary reviewed  Chart reviewed      Cardiovascular  Hyperlipidemia, Hypertension controlled, Valve replacement mitral valve  replacement, CAD , History of CABG, CHF compensated CHF,    Pulmonary  Smoker ex-smoker  ,        GI/Hepatic    GI bleeding , active and > 500ml blood loss,        Negative  ROS        Endo/Other  Negative endo/other ROS      GYN  Negative gynecology ROS          Hematology  Anemia acute blood loss anemia,  Coagulation disorder currently taking oral anticoagulants,    Musculoskeletal    Arthritis     Neurology  Negative neurology ROS      Psychology   Anxiety,              Physical Exam    Airway    Mallampati score: III  TM Distance: <3 FB  Neck ROM: limited     Dental   No notable dental hx     Cardiovascular  Cardiovascular exam normal    Pulmonary  Pulmonary exam normal     Other Findings        Anesthesia Plan  ASA Score- 3     Anesthesia Type- IV sedation with anesthesia with ASA Monitors  Additional Monitors:   Airway Plan:         Plan Factors-    Induction- intravenous  Postoperative Plan-     Informed Consent- Anesthetic plan and risks discussed with patient

## 2018-11-14 NOTE — SOCIAL WORK
Met with patient to discuss the role of Care Management  Patient resides eith his wife in a ranch home with 3 YANETH  He is independent of ADL's and is caregiver for his wife who is not well  She us currently being cared for by their son  Patient recently has been usuing a quad cane  He has been followed by [facility] in the past   He plans to return home at discharge and is unsure of discharge needs

## 2018-11-14 NOTE — ASSESSMENT & PLAN NOTE
-continue beta-blocker, statin  -continue to hold ACE-inhibitor  Check creatinine in the morning    Blood pressure currently stable   -no aspirin or Coumadin with GI bleeding  -no Lasix with GI bleeding

## 2018-11-14 NOTE — OP NOTE
OPERATIVE REPORT  PATIENT NAME: Kaitlynn Kendall    :  1935  MRN: [id number]  Pt Location:  GI ROOM 01    ESOPHAGOGASTRODUODENOSCOPY    PROCEDURE: EGD with biopsy    SEDATION: Monitored anesthesia care, check anesthesia records    ASA Class: 3    INDICATIONS:  Anemia recent melena    CONSENT:  Informed consent was obtained for the procedure, including sedation after explaining the risks and benefits of the procedure  Risks including but not limited to bleeding, perforation, infection, and missed lesion  PREPARATION:   Telemetry, pulse oximetry, blood pressure were monitored throughout the procedure  Patient was identified by myself both verbally and by visual inspection of ID band  DESCRIPTION:   Patient was placed in the left lateral decubitus position and was sedated with the above medication  The gastroscope was introduced in to the oropharynx and the esophagus was intubated under direct visualization  Scope was passed down the esophagus up to 2nd part of the duodenum  A careful inspection was made as the gastroscope was withdrawn, including a retroflexed view of the stomach; findings and interventions are described below  SPECIMENS TAKEN:      Two small-bowel biopsies taken no bleeding    ESTIMATED BLOOD LOSS:      In significant      FINDINGS:    #1  Esophagus- normal  #2  Stomach- normal    #3  Duodenum- question scalloping of the folds biopsies taken for celiac         IMPRESSIONS:      Essentially negative EGD small bowel biopsy pending    RECOMMENDATIONS:     Await the results of the biopsies  Would recommend colonoscopy if the patient is agreeable  COMPLICATIONS:  None; patient tolerated the procedure well            DISPOSITION: PACU           CONDITION: Stable    SIGNATURE: Fady Mehta MD  DATE: 2018  TIME: 10:58 AM

## 2018-11-14 NOTE — PLAN OF CARE
Problem: PHYSICAL THERAPY ADULT  Goal: Performs mobility at highest level of function for planned discharge setting  See evaluation for individualized goals  Treatment/Interventions: ADL retraining, Functional transfer training, LE strengthening/ROM, Therapeutic exercise, Endurance training, Gait training, Spoke to nursing          See flowsheet documentation for full assessment, interventions and recommendations  Outcome: Progressing  Prognosis: Good  Problem List: Decreased mobility  Assessment: Pt seen at bedside, perform funcitonal transfers to and form commode wihtout device  Unable to further assess gait due to lines andbowel prep for colonoscopy  will recheck after studies completed to ensure safe ind ambulation for home d/c   Barriers to Discharge:  (medical status)     Recommendation: Home with family support     PT - OK to Discharge: No    See flowsheet documentation for full assessment

## 2018-11-15 ENCOUNTER — ANESTHESIA EVENT (INPATIENT)
Dept: PERIOP | Facility: HOSPITAL | Age: 83
DRG: 813 | End: 2018-11-15
Payer: MEDICARE

## 2018-11-15 ENCOUNTER — ANESTHESIA (INPATIENT)
Dept: PERIOP | Facility: HOSPITAL | Age: 83
DRG: 813 | End: 2018-11-15
Payer: MEDICARE

## 2018-11-15 LAB
ABO GROUP BLD BPU: NORMAL
ABO GROUP BLD BPU: NORMAL
ANION GAP SERPL CALCULATED.3IONS-SCNC: 9 MMOL/L (ref 4–13)
BPU ID: NORMAL
BPU ID: NORMAL
BUN SERPL-MCNC: 16 MG/DL (ref 5–25)
CALCIUM SERPL-MCNC: 7.4 MG/DL (ref 8.3–10.1)
CHLORIDE SERPL-SCNC: 113 MMOL/L (ref 100–108)
CO2 SERPL-SCNC: 23 MMOL/L (ref 21–32)
CREAT SERPL-MCNC: 0.97 MG/DL (ref 0.6–1.3)
CROSSMATCH: NORMAL
CROSSMATCH: NORMAL
ERYTHROCYTE [DISTWIDTH] IN BLOOD BY AUTOMATED COUNT: 14.3 % (ref 11.6–15.1)
FERRITIN SERPL-MCNC: 19 NG/ML (ref 8–388)
GFR SERPL CREATININE-BSD FRML MDRD: 72 ML/MIN/1.73SQ M
GLUCOSE SERPL-MCNC: 89 MG/DL (ref 65–140)
HCT VFR BLD AUTO: 21 % (ref 36.5–49.3)
HGB BLD-MCNC: 6.4 G/DL (ref 12–17)
HGB BLD-MCNC: 7.8 G/DL (ref 12–17)
HGB BLD-MCNC: 9.2 G/DL (ref 12–17)
INR PPP: 1.49 (ref 0.86–1.17)
IRON SATN MFR SERPL: 4 %
IRON SERPL-MCNC: 12 UG/DL (ref 65–175)
MCH RBC QN AUTO: 27.5 PG (ref 26.8–34.3)
MCHC RBC AUTO-ENTMCNC: 30.5 G/DL (ref 31.4–37.4)
MCV RBC AUTO: 90 FL (ref 82–98)
PLATELET # BLD AUTO: 202 THOUSANDS/UL (ref 149–390)
PMV BLD AUTO: 9.9 FL (ref 8.9–12.7)
POTASSIUM SERPL-SCNC: 3.7 MMOL/L (ref 3.5–5.3)
PROTHROMBIN TIME: 17.2 SECONDS (ref 11.8–14.2)
RBC # BLD AUTO: 2.33 MILLION/UL (ref 3.88–5.62)
SODIUM SERPL-SCNC: 145 MMOL/L (ref 136–145)
TIBC SERPL-MCNC: 281 UG/DL (ref 250–450)
UNIT DISPENSE STATUS: NORMAL
UNIT DISPENSE STATUS: NORMAL
UNIT PRODUCT CODE: NORMAL
UNIT PRODUCT CODE: NORMAL
UNIT RH: NORMAL
UNIT RH: NORMAL
WBC # BLD AUTO: 5.68 THOUSAND/UL (ref 4.31–10.16)

## 2018-11-15 PROCEDURE — G8988 SELF CARE GOAL STATUS: HCPCS

## 2018-11-15 PROCEDURE — 83550 IRON BINDING TEST: CPT | Performed by: HOSPITALIST

## 2018-11-15 PROCEDURE — 85610 PROTHROMBIN TIME: CPT | Performed by: HOSPITALIST

## 2018-11-15 PROCEDURE — 82728 ASSAY OF FERRITIN: CPT | Performed by: HOSPITALIST

## 2018-11-15 PROCEDURE — P9021 RED BLOOD CELLS UNIT: HCPCS

## 2018-11-15 PROCEDURE — 85027 COMPLETE CBC AUTOMATED: CPT | Performed by: HOSPITALIST

## 2018-11-15 PROCEDURE — 97166 OT EVAL MOD COMPLEX 45 MIN: CPT

## 2018-11-15 PROCEDURE — 85018 HEMOGLOBIN: CPT | Performed by: HOSPITALIST

## 2018-11-15 PROCEDURE — G8989 SELF CARE D/C STATUS: HCPCS

## 2018-11-15 PROCEDURE — 99233 SBSQ HOSP IP/OBS HIGH 50: CPT | Performed by: HOSPITALIST

## 2018-11-15 PROCEDURE — 0W3P8ZZ CONTROL BLEEDING IN GASTROINTESTINAL TRACT, VIA NATURAL OR ARTIFICIAL OPENING ENDOSCOPIC: ICD-10-PCS | Performed by: INTERNAL MEDICINE

## 2018-11-15 PROCEDURE — 83540 ASSAY OF IRON: CPT | Performed by: HOSPITALIST

## 2018-11-15 PROCEDURE — G8987 SELF CARE CURRENT STATUS: HCPCS

## 2018-11-15 PROCEDURE — 97116 GAIT TRAINING THERAPY: CPT

## 2018-11-15 PROCEDURE — 97530 THERAPEUTIC ACTIVITIES: CPT

## 2018-11-15 PROCEDURE — 80048 BASIC METABOLIC PNL TOTAL CA: CPT | Performed by: HOSPITALIST

## 2018-11-15 RX ORDER — SODIUM CHLORIDE 9 MG/ML
50 INJECTION, SOLUTION INTRAVENOUS CONTINUOUS
Status: DISCONTINUED | OUTPATIENT
Start: 2018-11-15 | End: 2018-11-15

## 2018-11-15 RX ORDER — PROPOFOL 10 MG/ML
INJECTION, EMULSION INTRAVENOUS AS NEEDED
Status: DISCONTINUED | OUTPATIENT
Start: 2018-11-15 | End: 2018-11-15 | Stop reason: SURG

## 2018-11-15 RX ORDER — LISINOPRIL 5 MG/1
5 TABLET ORAL DAILY
Status: DISCONTINUED | OUTPATIENT
Start: 2018-11-15 | End: 2018-11-16 | Stop reason: HOSPADM

## 2018-11-15 RX ORDER — SODIUM CHLORIDE 9 MG/ML
INJECTION, SOLUTION INTRAVENOUS CONTINUOUS PRN
Status: DISCONTINUED | OUTPATIENT
Start: 2018-11-15 | End: 2018-11-15 | Stop reason: SURG

## 2018-11-15 RX ADMIN — FERROUS SULFATE TAB 325 MG (65 MG ELEMENTAL FE) 325 MG: 325 (65 FE) TAB at 08:16

## 2018-11-15 RX ADMIN — LATANOPROST 1 DROP: 50 SOLUTION/ DROPS OPHTHALMIC at 22:34

## 2018-11-15 RX ADMIN — PROPOFOL 40 MG: 10 INJECTION, EMULSION INTRAVENOUS at 09:37

## 2018-11-15 RX ADMIN — PROPOFOL 40 MG: 10 INJECTION, EMULSION INTRAVENOUS at 09:43

## 2018-11-15 RX ADMIN — ATORVASTATIN CALCIUM 10 MG: 10 TABLET, FILM COATED ORAL at 16:47

## 2018-11-15 RX ADMIN — PROPOFOL 20 MG: 10 INJECTION, EMULSION INTRAVENOUS at 09:34

## 2018-11-15 RX ADMIN — PROPOFOL 20 MG: 10 INJECTION, EMULSION INTRAVENOUS at 09:25

## 2018-11-15 RX ADMIN — PROPOFOL 20 MG: 10 INJECTION, EMULSION INTRAVENOUS at 09:28

## 2018-11-15 RX ADMIN — METOPROLOL TARTRATE 25 MG: 25 TABLET ORAL at 22:34

## 2018-11-15 RX ADMIN — METOPROLOL TARTRATE 25 MG: 25 TABLET ORAL at 08:16

## 2018-11-15 RX ADMIN — FERROUS SULFATE TAB 325 MG (65 MG ELEMENTAL FE) 325 MG: 325 (65 FE) TAB at 05:43

## 2018-11-15 RX ADMIN — SODIUM CHLORIDE: 0.9 INJECTION, SOLUTION INTRAVENOUS at 09:15

## 2018-11-15 RX ADMIN — PROPOFOL 60 MG: 10 INJECTION, EMULSION INTRAVENOUS at 09:22

## 2018-11-15 RX ADMIN — SODIUM CHLORIDE 100 ML/HR: 0.9 INJECTION, SOLUTION INTRAVENOUS at 05:43

## 2018-11-15 RX ADMIN — PROPOFOL 20 MG: 10 INJECTION, EMULSION INTRAVENOUS at 09:31

## 2018-11-15 RX ADMIN — PROPOFOL 20 MG: 10 INJECTION, EMULSION INTRAVENOUS at 09:30

## 2018-11-15 NOTE — PLAN OF CARE
Problem: PHYSICAL THERAPY ADULT  Goal: Performs mobility at highest level of function for planned discharge setting  See evaluation for individualized goals  Treatment/Interventions: ADL retraining, Functional transfer training, LE strengthening/ROM, Therapeutic exercise, Endurance training, Gait training, Spoke to nursing          See flowsheet documentation for full assessment, interventions and recommendations  Outcome: Progressing  Prognosis: Good  Problem List: Decreased mobility  Assessment: Pt progressing with mobility  Able to amb shor tdistance in room  Instr to ring for staff prior to going to   Will follow for 1-2 additional sessions to assess gait further in hallway and steps  Barriers to Discharge:  (medical status)     Recommendation: Home independently     PT - OK to Discharge: No    See flowsheet documentation for full assessment

## 2018-11-15 NOTE — ANESTHESIA PREPROCEDURE EVALUATION
Review of Systems/Medical History  Patient summary reviewed  Chart reviewed      Cardiovascular  Pacemaker/AICD, Hyperlipidemia, Hypertension controlled, Valve replacement , Past MI > 6 months, CAD , Cardiac stents > 1 year Dysrhythmias , atrial fibrillation, CHF compensated CHF,    Pulmonary  Smoker ex-smoker  , COPD ,        GI/Hepatic    GI bleeding ,        Negative  ROS        Endo/Other  Negative endo/other ROS      GYN       Hematology  Anemia acute blood loss anemia,     Musculoskeletal    Arthritis     Neurology   Psychology   Anxiety, Depression ,              Physical Exam    Airway    Mallampati score: II  TM Distance: >3 FB  Neck ROM: full     Dental   No notable dental hx     Cardiovascular  Rhythm: regular, Rate: normal, Cardiovascular exam normal    Pulmonary  Pulmonary exam normal Breath sounds clear to auscultation,     Other Findings        Anesthesia Plan  ASA Score- 3     Anesthesia Type- IV sedation with anesthesia with ASA Monitors  Additional Monitors:   Airway Plan:         Plan Factors-    Induction- intravenous  Postoperative Plan-     Informed Consent- Anesthetic plan and risks discussed with patient  I personally reviewed this patient with the CRNA  Discussed and agreed on the Anesthesia Plan with the CRNA  Dara Soulier

## 2018-11-15 NOTE — ASSESSMENT & PLAN NOTE
-hold Coumadin with GI bleeding  -continue beta-blocker  -rate controlled  -obtain echocardiogram results from cardiologist's office

## 2018-11-15 NOTE — PROGRESS NOTES
Resumed care of patient from RN, Noelle Smiley  Patient stable and resting in bed  Ongoing assessment

## 2018-11-15 NOTE — PROGRESS NOTES
Progress Note - Kaitlynn Kendall 1935, 80 y o  male MRN: [id number]    Unit/Bed#: -02 Encounter: [id number]    Primary Care Provider: Manas Walter PA-C   Date and time admitted to hospital: 11/13/2018  9:44 AM        Paroxysmal atrial fibrillation (HCC)   Assessment & Plan    -hold Coumadin with GI bleeding  -continue beta-blocker  -rate controlled  -obtain echocardiogram results from cardiologist's office     Coagulopathy ([facility] [address] )   Assessment & Plan    -Coumadin coagulopathy causing GI bleeding  -hold Coumadin     Hypertension   Assessment & Plan    -continue beta-blocker, restart ACE-inhibitor     Arteriosclerotic cardiovascular disease   Assessment & Plan    -continue beta-blocker, statin  -restart ACE-inhibitor    -no aspirin or Coumadin with GI bleeding  -no Lasix with GI bleeding     * Acute blood loss anemia   Assessment & Plan    -due to GI bleeding due to Coumadin coagulopathy  -transfuse 2 more units of packed red blood cells this morning  -IV fluids for support  -vitamin K was given in the ER and the patient's INR is 1 49  -hold Coumadin and aspirin  -11/14/18 EGD:  #1  Esophagus- normal  #2  Stomach- normal  #3  Duodenum- question scalloping of the folds biopsies taken for celiac  -colonoscopy today       VTE Pharmacologic Prophylaxis:   Pharmacologic: Pharmacologic VTE Prophylaxis contraindicated due to GI bleed  Mechanical VTE Prophylaxis in Place: Yes    Patient Centered Rounds: I have performed bedside rounds with nursing staff today  Education and Discussions with Family / Patient:  Patient    Time Spent for Care: 20 minutes  More than 50% of total time spent on counseling and coordination of care as described above      Current Length of Stay: 2 day(s)    Current Patient Status: Inpatient   Certification Statement: The patient will continue to require additional inpatient hospital stay due to GI bleed    Discharge Plan:  1-2 days    Code Status: Level 1 - Full Code      Subjective: Patient without new complaints today  Objective:     Vitals:   Temp (24hrs), Av 3 °F (36 8 °C), Min:97 7 °F (36 5 °C), Max:99 2 °F (37 3 °C)    Temp:  [97 7 °F (36 5 °C)-99 2 °F (37 3 °C)] 97 7 °F (36 5 °C)  HR:  [] 100  Resp:  [16-20] 18  BP: (102-166)/(55-79) 166/79  SpO2:  [94 %-100 %] 97 %  Body mass index is 25 89 kg/m²  Input and Output Summary (last 24 hours): Intake/Output Summary (Last 24 hours) at 11/15/18 0832  Last data filed at 11/15/18 0816   Gross per 24 hour   Intake          2405 67 ml   Output             1150 ml   Net          1255 67 ml       Physical Exam:     Physical Exam  Gen: NAD, AAOx3, well developed, well nourished  Eyes: EOMI, PERRLA, no scleral icterus, pale conjunctiva  ENMT:  Oropharynx clear of erythema or exudates, no nasal discharge, no otic discharge, moist mucous membranes  Neck:  Supple, mild JVD  Cardiovascular:   normal rate, irregularly irregular rhythm, normal S1-S2, no murmurs, rubs, or gallops  Lungs:  Clear to auscultation bilaterally, no wheezes, or rales, or rhonchi  Abdomen:  Positive bowel sounds, soft, nontender, nondistended, no palpable organomegaly   Skin:  Intact, no obvious lesions or rashes, no edema  Neuro: Cranial nerves 2-12 are intact, non-focal, 5/5 strength in all 4 extremities    Additional Data:     Labs:      Results from last 7 days  Lab Units 11/15/18  0430  18  0436   WBC Thousand/uL 5 68  --  5 36   HEMOGLOBIN g/dL 6 4*  < > 7 8*   HEMATOCRIT % 21 0*  --  25 9*   PLATELETS Thousands/uL 202  --  183   NEUTROS PCT %  --   --  67   LYMPHS PCT %  --   --  19   MONOS PCT %  --   --  10   EOS PCT %  --   --  4   < > = values in this interval not displayed      Results from last 7 days  Lab Units 11/15/18  0430 18  1019   POTASSIUM mmol/L 3 7 4 1   CHLORIDE mmol/L 113* 108   CO2 mmol/L 23 24   BUN mg/dL 16 34*   CREATININE mg/dL 0 97 1 26   ANION GAP mmol/L 9 10   CALCIUM mg/dL 7 4* 8 2*   ALBUMIN g/dL  --  3 4*   TOTAL BILIRUBIN mg/dL  --  0 40   ALK PHOS U/L  --  44*   ALT U/L  --  22   AST U/L  --  21       Results from last 7 days  Lab Units 11/15/18  0430   INR  1 49*                       * I Have Reviewed All Lab Data Listed Above  * Additional Pertinent Lab Tests Reviewed: [place] Admission Reviewed    Recent Cultures (last 7 days):           Last 24 Hours Medication List:     Current Facility-Administered Medications:  atorvastatin 10 mg Oral Daily With Steven Montes MD    ferrous sulfate 325 mg Oral Daily With Breakfast Lynne Amaya MD    latanoprost 1 drop Both Eyes HS Lynne Amaya MD    lisinopril 5 mg Oral Daily Lynne Amaya MD    metoprolol tartrate 25 mg Oral Q12H [address] Lynne Amaya MD    pantoprozole (PROTONIX) infusion (Continuous) 8 mg/hr Intravenous Continuous Lynne Amaya MD Last Rate: 8 mg/hr (11/14/18 6490)        Today, Patient Was Seen By: Lynne Amaya MD    ** Please Note: Dictation voice to text software may have been used in the creation of this document   **

## 2018-11-15 NOTE — PROGRESS NOTES
Blood started, Iv leaking  New Iv placed in Right Wrist patent and secured and dressing placed  Iv in Left AC removed  Blood restarted  Sbar report given to MANDA Adams  Questions answered and care handed off

## 2018-11-15 NOTE — SOCIAL WORK
PT recommending home with family support  Spoke with patient who feels that he will not need VNA when he returns home  Will follow

## 2018-11-15 NOTE — ASSESSMENT & PLAN NOTE
-due to GI bleeding due to Coumadin coagulopathy  -transfuse 2 more units of packed red blood cells this morning  -IV fluids for support  -vitamin K was given in the ER and the patient's INR is 1 49  -hold Coumadin and aspirin  -11/14/18 EGD:  #1  Esophagus- normal  #2  Stomach- normal  #3   Duodenum- question scalloping of the folds biopsies taken for celiac  -colonoscopy today

## 2018-11-15 NOTE — OP NOTE
OPERATIVE REPORT  PATIENT NAME: David Rose    :  1935  MRN: [id number]  Pt Location:  GI ROOM 01    Colonoscopy Procedure Note    Procedure: Colonoscopy    Sedation: Monitored anesthesia care, check anesthesia records      ASA Class: 3    INDICATIONS: Anemia, Iron Deficiency, acute blood loss anemia    POST-OP DIAGNOSIS: See the impression below    Procedure Details     Prior colonoscopy: 5 years ago  Informed consent was obtained for the procedure, including sedation  Risks of perforation, hemorrhage, adverse drug reaction and aspiration were discussed  The patient was placed in the left lateral decubitus position  Based on the pre-procedure assessment, including review of the patient's medical history, medications, allergies, and review of systems, he had been deemed to be an appropriate candidate for conscious sedation; he was therefore sedated with the medications listed below  The patient was monitored continuously with telemetry, pulse oximetry, blood pressure monitoring, and direct observations  A rectal examination was performed  The variable-stiffness pediatric colonoscope was inserted into the rectum and advanced under direct vision to the cecum, which was identified by the ileocecal valve and appendiceal orifice  The quality of the colonic preparation was fair  A careful inspection was made as the colonoscope was withdrawn, including a retroflexed view of the rectum; findings and interventions are described below  Specimens Taken:  none    Estimated Blood Loss:  none    Findings:  1  Fair prep throughout the colon  However, small amounts of semi-solid/liquid light brown stool noted throughout the colon  NO blood noted in the colon  2  One small nonbleeding ascending colon angioectasia s/p successful bicap cautery for bleeding prevention  3  Moderate sigmoid diverticulosis  4  Small internal hemorrhoids             Complications:  None; patient tolerated the procedure well     Impression:    One small angioectasia in the ascending colon - this was not bleeding but destroyed for prevention using bicap cautery  Otherwise, no bleeding seen in the colon  Moderate diverticulosis  Small hemorrhoids  Fair prep  Recommendations:  Resume home meds  Resume previous diet  Monitor H&H and transfuse to hgb >8  Given the significant drop in hgb with no overt GI bleeding, likely chronic angioectasias being aggravated by anticoagulation  Recommend proceeding with small bowel capsule as outpatient if ongoing drops in hgb  No further overt bleeding noted off Coumadin - can resume once hgb stabilizes  Follow up with GI in 2-3 weeks after discharge  Repeat colonoscopy not recommended  If repeat colonoscopy is not being recommended, this is because of age (age = 77 or greater)        SIGNATURE: Adrian Tripp MD  DATE: November 15, 2018  TIME: 9:46 AM  '

## 2018-11-15 NOTE — ASSESSMENT & PLAN NOTE
-continue beta-blocker, statin  -restart ACE-inhibitor    -no aspirin or Coumadin with GI bleeding  -no Lasix with GI bleeding

## 2018-11-15 NOTE — OCCUPATIONAL THERAPY NOTE
[address]  Evaluation     Patient Name: Dontae Dougherty  CEXXZ'R Date: 11/15/2018  Problem List  Patient Active Problem List   Diagnosis    Anemia    Anxiety    Arteriosclerotic cardiovascular disease    Arthritis    Herpes zoster    Hyperglycemia    Hyperlipidemia    Hypertension    Hypocalcemia    Insomnia    Severe anemia    GI bleed    Acute blood loss anemia    Coagulopathy (HCC)    Paroxysmal atrial fibrillation (HCC)     Past Medical History  History reviewed  No pertinent past medical history  Past Surgical History  Past Surgical History:   Procedure Laterality Date    CORONARY ARTERY BYPASS GRAFT      ESOPHAGOGASTRODUODENOSCOPY N/A 11/14/2018    Procedure: ESOPHAGOGASTRODUODENOSCOPY (EGD);   Surgeon: Layne Lopez MD;  Location:  MAIN OR;  Service: Gastroenterology    MITRAL VALVE REPLACEMENT           11/15/18 1215   Note Type   Note type Eval only   Restrictions/Precautions   Other Precautions Telemetry;Multiple lines   Pain Assessment   Pain Assessment No/denies pain   Pain Score No Pain   Home Living   Type of [facility] One level  (3STE)   Bathroom Shower/Tub Walk-in shower   Bathroom Equipment Grab bars around toilet;Commode;Built-in shower seat   Bathroom Accessibility Accessible   Home Equipment Quad cane   Additional Comments Quad cane was recent in response to onset of weakness   Prior Function   Level of [place] Independent with ADLs and functional mobility   Lives With Milagros Help From Family   ADL Assistance Independent   IADLs Independent   Falls in the last 6 months 0   Vocational Retired   Comments Patient provides IADL care for spouse who has low vision and DM   Lifestyle   Autonomy Independent in ADLs IADLs and functional mobility, +    Reciprocal Relationships Lives with wife, son lives nearby   Psychosocial   Psychosocial (WDL) WDL   Subjective   Subjective "How soon do I get to go home?"    ADL   Where Assessed Edge of bed   Eating Assistance [address] to Stand 7  Independent   Stand to Sit 7  Independent   Stand pivot 7  Independent   Additional Comments Pt perfroms BUE AROM/MMT screen while standing and is able to maintain balance without difficulty    Functional Mobility   Functional Mobility 7  Independent   Additional Comments Pt does not use cane  Balance   Static Sitting Normal   Dynamic Sitting Normal   Static Standing Good   Dynamic Standing Good   Activity Tolerance   Activity Tolerance Patient tolerated treatment well   Nurse Made Aware OK to see per Juanita De Los Santos RN   RUE Assessment   RUE Assessment WFL   LUE Assessment   LUE Assessment WFL   Hand Function   Gross Motor Coordination Functional   Fine Motor Coordination Functional   Vision-Basic Assessment   Current Vision Wears glasses all the time   Patient Visual Report (No changes to functional vision since onset of symptoms  )   Cognition   Overall Cognitive Status [facility]   Arousal/Participation Alert; Cooperative   Attention Within functional limits   Orientation Level Oriented X4   Memory Within functional limits   Following Commands Follows all commands and directions without difficulty   Assessment   Prognosis Good   Assessment Pt is a 80 y o  male seen for OT evaluation s/p admit to [facility] on 11/13/2018 w/ Acute blood loss anemia  Comorbidities affecting pt's functional performance at time of assessment include: Anemia, anxiety, arteriosclerotic cardiovascular disease, arthritis, hyperglycemia, hyperlipedemia, hypertension, hypocalcemia, insomnia, severe anemia, GI bleed    Prior to admission, pt was independent in all ADLs/IADLs, functional mobility (with recent use of a cane secondary to weakness and SOB) and driving, assisted his wife who is not in good health  Upon evaluation: Pt requires no assistance for ADLs/functional mobility, and reports he is hoping to return home without the use of his cane  Pt was able to demonstrate independent LB dressing, safe toilet transfer and ambulation in his room with good standing balance  No skilled OT indicated at this time  From OT standpoint, recommendation at time of d/c would be home independent       Goals   Patient Goals "Go home"   Plan   OT Frequency Eval only   Barthel Index   Feeding 10   Bathing 5   Grooming Score 5   Dressing Score 10   Bladder Score 10   Bowels Score 10   Toilet Use Score 10   Transfers (Bed/Chair) Score 15   Mobility (Level Surface) Score 10   Stairs Score 5   Barthel Index Score 90       David Mora, MOT, OTR/L

## 2018-11-15 NOTE — PROGRESS NOTES
0600- PA-ROSALINDA Akhtar aware of Hgb  Orders for 2 units PRBC's  Patient aware of plan of care  Patient appears anxious about set up of room with commode and bedside table  He said he had difficult time sleeping with noise of other patients and bowel prep  He wanted room rearranged with commode because couldn't reach call bell  Linens changed  Bed bath given  Lotion applied to back and powdered  Personal items within reach  Blood requested and sent for, not ready will call when ready

## 2018-11-16 VITALS
OXYGEN SATURATION: 99 % | RESPIRATION RATE: 20 BRPM | SYSTOLIC BLOOD PRESSURE: 139 MMHG | TEMPERATURE: 97.6 F | DIASTOLIC BLOOD PRESSURE: 83 MMHG | WEIGHT: 184.53 LBS | HEART RATE: 75 BPM | HEIGHT: 71 IN | BODY MASS INDEX: 25.83 KG/M2

## 2018-11-16 DIAGNOSIS — D50.0 IRON DEFICIENCY ANEMIA DUE TO CHRONIC BLOOD LOSS: Primary | ICD-10-CM

## 2018-11-16 LAB
ANION GAP SERPL CALCULATED.3IONS-SCNC: 9 MMOL/L (ref 4–13)
BUN SERPL-MCNC: 19 MG/DL (ref 5–25)
CALCIUM SERPL-MCNC: 7.8 MG/DL (ref 8.3–10.1)
CHLORIDE SERPL-SCNC: 111 MMOL/L (ref 100–108)
CO2 SERPL-SCNC: 24 MMOL/L (ref 21–32)
CREAT SERPL-MCNC: 1.03 MG/DL (ref 0.6–1.3)
ERYTHROCYTE [DISTWIDTH] IN BLOOD BY AUTOMATED COUNT: 14.3 % (ref 11.6–15.1)
GFR SERPL CREATININE-BSD FRML MDRD: 67 ML/MIN/1.73SQ M
GLUCOSE SERPL-MCNC: 92 MG/DL (ref 65–140)
HCT VFR BLD AUTO: 27.5 % (ref 36.5–49.3)
HGB BLD-MCNC: 8.6 G/DL (ref 12–17)
HGB BLD-MCNC: 8.7 G/DL (ref 12–17)
INR PPP: 1.38 (ref 0.86–1.17)
MCH RBC QN AUTO: 28.6 PG (ref 26.8–34.3)
MCHC RBC AUTO-ENTMCNC: 31.6 G/DL (ref 31.4–37.4)
MCV RBC AUTO: 91 FL (ref 82–98)
PLATELET # BLD AUTO: 186 THOUSANDS/UL (ref 149–390)
PMV BLD AUTO: 9.8 FL (ref 8.9–12.7)
POTASSIUM SERPL-SCNC: 3.7 MMOL/L (ref 3.5–5.3)
PROTHROMBIN TIME: 16.2 SECONDS (ref 11.8–14.2)
RBC # BLD AUTO: 3.04 MILLION/UL (ref 3.88–5.62)
SODIUM SERPL-SCNC: 144 MMOL/L (ref 136–145)
WBC # BLD AUTO: 7.67 THOUSAND/UL (ref 4.31–10.16)

## 2018-11-16 PROCEDURE — 99239 HOSP IP/OBS DSCHRG MGMT >30: CPT | Performed by: HOSPITALIST

## 2018-11-16 PROCEDURE — 85027 COMPLETE CBC AUTOMATED: CPT | Performed by: HOSPITALIST

## 2018-11-16 PROCEDURE — 85610 PROTHROMBIN TIME: CPT | Performed by: HOSPITALIST

## 2018-11-16 PROCEDURE — 80048 BASIC METABOLIC PNL TOTAL CA: CPT | Performed by: HOSPITALIST

## 2018-11-16 PROCEDURE — 85018 HEMOGLOBIN: CPT | Performed by: HOSPITALIST

## 2018-11-16 RX ADMIN — METOPROLOL TARTRATE 25 MG: 25 TABLET ORAL at 08:09

## 2018-11-16 RX ADMIN — FERROUS SULFATE TAB 325 MG (65 MG ELEMENTAL FE) 325 MG: 325 (65 FE) TAB at 08:09

## 2018-11-16 RX ADMIN — LISINOPRIL 5 MG: 5 TABLET ORAL at 08:09

## 2018-11-16 NOTE — ASSESSMENT & PLAN NOTE
-continue beta-blocker, statin, ACE-inhibitor    -restart Lasix and coumadin on d/c  -hold ASA for now

## 2018-11-16 NOTE — ASSESSMENT & PLAN NOTE
-restarting Coumadin on d/c  -continue beta-blocker  -rate controlled  -patient recently had an echocardiogram at his cardiologist's office roughly 3 weeks ago

## 2018-11-16 NOTE — ASSESSMENT & PLAN NOTE
-due to GI bleeding due to Coumadin coagulopathy  -s/p 4 units of packed red blood cells this admission  -vitamin K was given in the ER   -restart Coumadin on d/c  -11/14/18 EGD:  #1  Esophagus- normal  #2  Stomach- normal  #3  Duodenum- question scalloping of the folds biopsies taken for celiac  -11/15/18 Colonoscopy:  1  Fair prep throughout the colon  However, small amounts of semi-solid/liquid light brown stool noted throughout the colon  NO blood noted in the colon  2  One small nonbleeding ascending colon angioectasia s/p successful bicap cautery for bleeding prevention  3  Moderate sigmoid diverticulosis  4  Small internal hemorrhoids

## 2018-11-16 NOTE — DISCHARGE SUMMARY
Discharge- Kaitlynn Kendall 1935, 80 y o  male MRN: [id number]    Unit/Bed#: [address] [place] [address] Encounter: [id number]    Primary Care Provider: Manas Walter PA-C   Date and time admitted to hospital: 11/13/2018  9:44 AM        Paroxysmal atrial fibrillation (HCC)   Assessment & Plan    -restarting Coumadin on d/c  -continue beta-blocker  -rate controlled  -patient recently had an echocardiogram at his cardiologist's office roughly 3 weeks ago     Coagulopathy ([address] )   Assessment & Plan    -Coumadin coagulopathy caused GI bleeding     Hypertension   Assessment & Plan    -continue beta-blocker/ACE-inhibitor     Arteriosclerotic cardiovascular disease   Assessment & Plan    -continue beta-blocker, statin, ACE-inhibitor    -restart Lasix and coumadin on d/c  -hold ASA for now     * Acute blood loss anemia   Assessment & Plan    -due to GI bleeding due to Coumadin coagulopathy  -s/p 4 units of packed red blood cells this admission  -vitamin K was given in the ER   -restart Coumadin on d/c  -11/14/18 EGD:  #1  Esophagus- normal  #2  Stomach- normal  #3  Duodenum- question scalloping of the folds biopsies taken for celiac  -11/15/18 Colonoscopy:  1  Fair prep throughout the colon  However, small amounts of semi-solid/liquid light brown stool noted throughout the colon  NO blood noted in the colon  2  One small nonbleeding ascending colon angioectasia s/p successful bicap cautery for bleeding prevention  3  Moderate sigmoid diverticulosis  4  Small internal hemorrhoids         Discharging Physician / Practitioner: Benjamin Jo MD  PCP: Manas Walter PA-C  Admission Date:   Admission Orders     Ordered        11/13/18 1203  Inpatient Admission (expected length of stay for this patient is greater than two midnights)  Once             Discharge Date: 11/16/18    Resolved Problems  Date Reviewed: 11/16/2018    None          Consultations During Hospital Stay:  · Gastroenterology    Procedures Performed:     · EGD and colonoscopy as above    Significant Findings / Test Results:     · See above    Incidental Findings:   · See above     Test Results Pending at Discharge (will require follow up): · None     Outpatient Tests Requested:  · CBC in 1 week, script from PCP  · capsule endoscopy which will be arranged by GI    Complications:  None    Reason for Admission:  GI bleed    Hospital Course:     David Rsoe is a 80 y o  male patient who originally presented to the hospital on 11/13/2018 due to GI bleed as outlined in the H&P done on admission  Please see above list of diagnoses and related plan for additional information  Condition at Discharge: good     Discharge Day Visit / Exam:     Subjective:  No acute complaints  Vitals: Blood Pressure: 139/83 (11/16/18 0803)  Pulse: 75 (11/16/18 0803)  Temperature: 97 6 °F (36 4 °C) (11/16/18 0803)  Temp Source: Oral (11/16/18 0803)  Respirations: 20 (11/16/18 0803)  Height: 5' 11" (180 3 cm) (11/13/18 1345)  Weight - Scale: 83 7 kg (184 lb 8 4 oz) (11/16/18 0803)  SpO2: 99 % (11/16/18 0803)  Exam:   Physical Exam  Gen: NAD, AAOx3, well developed, well nourished  Eyes: EOMI, PERRLA, no scleral icterus, pale conjunctiva  ENMT:  Oropharynx clear of erythema or exudates, no nasal discharge, no otic discharge, moist mucous membranes  Neck:  Supple, mild JVD  Cardiovascular:   normal rate, irregularly irregular rhythm, normal S1-S2, no murmurs, rubs, or gallops  Lungs:  Clear to auscultation bilaterally, no wheezes, or rales, or rhonchi  Abdomen:  Positive bowel sounds, soft, nontender, nondistended, no palpable organomegaly   Skin:  Intact, no obvious lesions or rashes, no edema  Neuro: Cranial nerves 2-12 are intact, non-focal, 5/5 strength in all 4 extremities    Discharge instructions/Information to patient and family:   See after visit summary for information provided to patient and family        Provisions for Follow-Up Care:  See after visit summary for information related to follow-up care and any pertinent home health orders  Disposition:     Home    For Discharges to [facility] SNF:   · Not Applicable to this Patient - Not Applicable to this Patient    Planned Readmission: None     Discharge Statement:  I spent 32 minutes discharging the patient  This time was spent on the day of discharge  I had direct contact with the patient on the day of discharge  Greater than 50% of the total time was spent examining patient, answering all patient questions, arranging and discussing plan of care with patient as well as directly providing post-discharge instructions  Additional time then spent on discharge activities  Discharge Medications:  See after visit summary for reconciled discharge medications provided to patient and family        ** Please Note: This note has been constructed using a voice recognition system **

## 2018-11-16 NOTE — PROGRESS NOTES
[facility] [place]  [id number]    80 y o   male      ASSESSMENT  1  Anemia, new onset, normocytic in the setting of chronic anticoagulation with Coumadin  No overt GI blood loss noted  A colon performed 11/15 was negative for any overt GI bleeding source, although was a suboptimal examination due to suboptimal preparation  One small nonbleeding ascending colon angiectasia status post successful BICAP cauterization her bleeding prevention  A prior upper endoscopy was negative for an upper GI bleeding source  Hemoglobin holding at 8 7 gms  2  On chronic anticoagulation with Coumadin for train of mitral replacement, CAD/CABG    PLAN  1  Follow H&H  2  Resume Coumadin and monitor for signs or symptoms of GI bood loss  3  We will arrange for an outpatient capsule endoscopy    Chief Complaint   Patient presents with    Weakness - Generalized     pt presents to ED from home c/o weakness, and SOB on exertion       SUBJECTIVE/HPI   Patient with some weakness  Denies any overt GI blood loss  Tolerating regular diet  Denies any abdominal pain, nausea, vomiting, melena or rectal bleeding      /83 (BP Location: Right arm)   Pulse 75   Temp 97 6 °F (36 4 °C) (Oral)   Resp 20   Ht 5' 11" (1 803 m)   Wt 83 7 kg (184 lb 8 4 oz)   SpO2 99%   BMI 25 74 kg/m²       PHYSICALEXAM  Constitutional:  Well developed, no acute distress, non-toxic appearance   Eyes: conjunctiva pale  HENT:  Atraumatic  Respiratory:  No respiratory distress  Cardiovascular:  Normal rate  GI:  Soft, nondistended  Musculoskeletal:  No edema  Neurologic:  Alert & oriented x 3  Psychiatric:  Speech and behavior appropriate       Lab Results   Component Value Date    GLUCOSE 95 09/11/2015    CALCIUM 7 8 (L) 11/16/2018     09/11/2015    K 3 7 11/16/2018    CO2 24 11/16/2018     (H) 11/16/2018    BUN 19 11/16/2018    CREATININE 1 03 11/16/2018     Lab Results   Component Value Date    WBC 7 67 11/16/2018    HGB 8 7 (L) 11/16/2018    HCT 27 5 (L) 11/16/2018    MCV 91 11/16/2018     11/16/2018     Lab Results   Component Value Date    ALT 22 11/13/2018    AST 21 11/13/2018    ALKPHOS 44 (L) 11/13/2018    BILITOT 0 67 09/11/2015     No results found for: AMYLASE  No results found for: LIPASE  Lab Results   Component Value Date    IRON 12 (L) 11/15/2018    TIBC 281 11/15/2018    FERRITIN 19 11/15/2018     Lab Results   Component Value Date    INR 1 38 (H) 11/16/2018       Counseling / Coordination of Care  Total floor / unit time spent today 25 minutes  Greater than 50% of total time was spent with the patient and / or family counseling and / or coordination of care  A description of the counseling / coordination of care: 15    Ramya Bernal

## 2018-11-17 ENCOUNTER — TRANSITIONAL CARE MANAGEMENT (OUTPATIENT)
Dept: FAMILY MEDICINE CLINIC | Facility: CLINIC | Age: 83
End: 2018-11-17

## 2018-11-21 ENCOUNTER — TELEPHONE (OUTPATIENT)
Dept: FAMILY MEDICINE CLINIC | Facility: CLINIC | Age: 83
End: 2018-11-21

## 2018-11-21 NOTE — TELEPHONE ENCOUNTER
Patient needs a script to have his  Hemaglobin  Checked today  He was in the hospital and was discharged the 16th of Nov  He  Does have an appointment with rishabh on Mon Nov 26th  Please call him when order is in chart  He was in hospital for low blood count

## 2018-11-23 ENCOUNTER — APPOINTMENT (OUTPATIENT)
Dept: LAB | Facility: HOSPITAL | Age: 83
End: 2018-11-23
Payer: MEDICARE

## 2018-11-23 LAB
BASOPHILS # BLD AUTO: 0.07 THOUSANDS/ΜL (ref 0–0.1)
BASOPHILS NFR BLD AUTO: 1 % (ref 0–1)
EOSINOPHIL # BLD AUTO: 0.46 THOUSAND/ΜL (ref 0–0.61)
EOSINOPHIL NFR BLD AUTO: 7 % (ref 0–6)
ERYTHROCYTE [DISTWIDTH] IN BLOOD BY AUTOMATED COUNT: 13.6 % (ref 11.6–15.1)
HCT VFR BLD AUTO: 34.6 % (ref 36.5–49.3)
HGB BLD-MCNC: 10.3 G/DL (ref 12–17)
IMM GRANULOCYTES # BLD AUTO: 0.02 THOUSAND/UL (ref 0–0.2)
IMM GRANULOCYTES NFR BLD AUTO: 0 % (ref 0–2)
LYMPHOCYTES # BLD AUTO: 1.45 THOUSANDS/ΜL (ref 0.6–4.47)
LYMPHOCYTES NFR BLD AUTO: 23 % (ref 14–44)
MCH RBC QN AUTO: 27.6 PG (ref 26.8–34.3)
MCHC RBC AUTO-ENTMCNC: 29.8 G/DL (ref 31.4–37.4)
MCV RBC AUTO: 93 FL (ref 82–98)
MONOCYTES # BLD AUTO: 0.75 THOUSAND/ΜL (ref 0.17–1.22)
MONOCYTES NFR BLD AUTO: 12 % (ref 4–12)
NEUTROPHILS # BLD AUTO: 3.6 THOUSANDS/ΜL (ref 1.85–7.62)
NEUTS SEG NFR BLD AUTO: 57 % (ref 43–75)
NRBC BLD AUTO-RTO: 0 /100 WBCS
PLATELET # BLD AUTO: 204 THOUSANDS/UL (ref 149–390)
PMV BLD AUTO: 11.1 FL (ref 8.9–12.7)
RBC # BLD AUTO: 3.73 MILLION/UL (ref 3.88–5.62)
WBC # BLD AUTO: 6.35 THOUSAND/UL (ref 4.31–10.16)

## 2018-11-23 PROCEDURE — 36415 COLL VENOUS BLD VENIPUNCTURE: CPT | Performed by: FAMILY MEDICINE

## 2018-11-23 PROCEDURE — 85025 COMPLETE CBC W/AUTO DIFF WBC: CPT | Performed by: FAMILY MEDICINE

## 2018-11-26 ENCOUNTER — OFFICE VISIT (OUTPATIENT)
Dept: FAMILY MEDICINE CLINIC | Facility: CLINIC | Age: 83
End: 2018-11-26
Payer: MEDICARE

## 2018-11-26 VITALS
SYSTOLIC BLOOD PRESSURE: 148 MMHG | HEIGHT: 71 IN | HEART RATE: 80 BPM | BODY MASS INDEX: 27.72 KG/M2 | WEIGHT: 198 LBS | DIASTOLIC BLOOD PRESSURE: 80 MMHG

## 2018-11-26 DIAGNOSIS — D50.0 IRON DEFICIENCY ANEMIA DUE TO CHRONIC BLOOD LOSS: ICD-10-CM

## 2018-11-26 DIAGNOSIS — D62 ACUTE BLOOD LOSS ANEMIA: Primary | ICD-10-CM

## 2018-11-26 DIAGNOSIS — I10 ESSENTIAL HYPERTENSION: ICD-10-CM

## 2018-11-26 DIAGNOSIS — K92.2 GASTROINTESTINAL HEMORRHAGE, UNSPECIFIED GASTROINTESTINAL HEMORRHAGE TYPE: ICD-10-CM

## 2018-11-26 PROCEDURE — 99495 TRANSJ CARE MGMT MOD F2F 14D: CPT | Performed by: FAMILY MEDICINE

## 2018-11-26 NOTE — ASSESSMENT & PLAN NOTE
His blood pressure is stable at 140 8/80 and he is on furosemide 20 mg daily and Lopressor 25 mg daily

## 2018-11-26 NOTE — PROGRESS NOTES
Assessment/Plan:     Acute blood loss anemia  Patient is currently on iron replacement and his hemoglobin is 10 3 as of 3 days ago  He has more energy and is less fatigued  Anemia  His anemia is resolving slowly  Hypertension  His blood pressure is stable at 140 8/80 and he is on furosemide 20 mg daily and Lopressor 25 mg daily  GI bleed  Currently doing well on iron replacement  He has no more dizziness, lightheadedness or near-syncope symptoms  Diagnoses and all orders for this visit:    Acute blood loss anemia  -     CBC and differential; Future    Gastrointestinal hemorrhage, unspecified gastrointestinal hemorrhage type  -     CBC and differential; Future    Essential hypertension    Iron deficiency anemia due to chronic blood loss  -     CBC and differential; Future         Subjective: Follow up to hospital   Pt was admitted to [facility] through ER  He was taken by ambulance for dizziness, lightheadedness, fatigue, diarrhea, near syncope  -  [facility]     Patient ID: Albina Guardado is a 80 y o  male  This is an 51-year-old male who comes in for a visit following hospitalization for a GI bleed  He was admitted to [address] [place] [address] on November 13th and discharged on November 16th  He is due to get a colonoscopy by camera on Wednesday and he was not looking forward to going through with this however, after discussing the options it was decided that he would do this after all  His blood pressure is 148/80 and his weight is up 2 lb from the previous visit to 198 lb  He was accompanied by his son who stayed with his wife while he was in the hospital         Review of Systems   Constitutional: Negative  Pallor   HENT: Negative  Eyes: Negative  Respiratory: Negative  Cardiovascular: Negative  Negative for chest pain  Gastrointestinal: Negative  Negative for constipation, diarrhea, nausea and vomiting  Endocrine: Negative  Genitourinary: Negative  Musculoskeletal: Negative  Skin: Negative  Allergic/Immunologic: Negative  Neurological: Negative  Hematological: Negative  Psychiatric/Behavioral: Negative  Objective:     Physical Exam   Constitutional: He is oriented to person, place, and time  He appears well-developed and well-nourished  HENT:   Head: Normocephalic  Right Ear: External ear normal    Left Ear: External ear normal    Mouth/Throat: Oropharynx is clear and moist    Eyes: Pupils are equal, round, and reactive to light  Conjunctivae and EOM are normal    Neck: Normal range of motion  Neck supple  Cardiovascular: Normal rate, regular rhythm and normal heart sounds  Pulmonary/Chest: Effort normal and breath sounds normal    Abdominal: Soft  Bowel sounds are normal    Musculoskeletal: Normal range of motion  Neurological: He is alert and oriented to person, place, and time  Skin: Skin is warm  Psychiatric: He has a normal mood and affect  His behavior is normal  Judgment and thought content normal    Nursing note and vitals reviewed  Vitals:    11/26/18 1539   BP: 148/80   BP Location: Left arm   Patient Position: Sitting   Cuff Size: Large   Pulse: 80   Weight: 89 8 kg (198 lb)   Height: 5' 11" (1 803 m)       Transitional Care Management Review:  Angélica Wu is a 80 y o  male here for TCM follow up  During the TCM phone call patient stated:    TCM Call (since 10/26/2018)     Date and time call was made  11/17/2018  9:36 AM    Hospital care reviewed  Records reviewed    Patient was hospitialized at  [address] [facility]    Date of Admission  11/13/18    Date of discharge  11/16/18    Diagnosis  GI BLEED    Disposition  Home    Current Symptoms  Weakness; Fatigue    Weakness severity  Mild    Fatigue severity  Mild      TCM Call (since 10/26/2018)     Post hospital issues  None    Should patient be enrolled in anticoag monitoring? Yes    Scheduled for follow up?   Yes (follow up on 11/26/2018 at 3:30pm with BT)    I have advised the patient to call PCP with any new or worsening symptoms  [address]          Erin Emmanuel PA-C

## 2018-11-26 NOTE — ASSESSMENT & PLAN NOTE
Currently doing well on iron replacement  He has no more dizziness, lightheadedness or near-syncope symptoms

## 2018-11-26 NOTE — ASSESSMENT & PLAN NOTE
Patient is currently on iron replacement and his hemoglobin is 10 3 as of 3 days ago  He has more energy and is less fatigued

## 2018-11-28 ENCOUNTER — TELEPHONE (OUTPATIENT)
Dept: FAMILY MEDICINE CLINIC | Facility: CLINIC | Age: 83
End: 2018-11-28

## 2018-11-28 NOTE — TELEPHONE ENCOUNTER
MARLENI:  INDIA FROM [facility] CALLING TO LET YOU KNOW THAT THE PT SHOWED UP THIS MORNING PREPPED FOR TEST AND AT THE LAST MINUTE DECLINED THE PROCEDURE  HIS SON EXPLAINED THAT BT WANTS HIM TO HAVE THE TEST DONE AND PT STILL DECLINED    THIS IS JUST AN UPDATE THAT PT DID NOT HAVE HIS PROCEDURE DONE AT [facility]

## 2019-01-08 ENCOUNTER — APPOINTMENT (OUTPATIENT)
Dept: LAB | Facility: HOSPITAL | Age: 84
End: 2019-01-08
Payer: MEDICARE

## 2019-01-08 DIAGNOSIS — D50.0 IRON DEFICIENCY ANEMIA DUE TO CHRONIC BLOOD LOSS: ICD-10-CM

## 2019-01-08 DIAGNOSIS — K92.2 GASTROINTESTINAL HEMORRHAGE, UNSPECIFIED GASTROINTESTINAL HEMORRHAGE TYPE: ICD-10-CM

## 2019-01-08 DIAGNOSIS — D62 ACUTE BLOOD LOSS ANEMIA: ICD-10-CM

## 2019-01-08 LAB
BASOPHILS # BLD AUTO: 0.05 THOUSANDS/ΜL (ref 0–0.1)
BASOPHILS NFR BLD AUTO: 1 % (ref 0–1)
EOSINOPHIL # BLD AUTO: 0.23 THOUSAND/ΜL (ref 0–0.61)
EOSINOPHIL NFR BLD AUTO: 4 % (ref 0–6)
ERYTHROCYTE [DISTWIDTH] IN BLOOD BY AUTOMATED COUNT: 14.8 % (ref 11.6–15.1)
HCT VFR BLD AUTO: 37.7 % (ref 36.5–49.3)
HGB BLD-MCNC: 11.4 G/DL (ref 12–17)
IMM GRANULOCYTES # BLD AUTO: 0.01 THOUSAND/UL (ref 0–0.2)
IMM GRANULOCYTES NFR BLD AUTO: 0 % (ref 0–2)
LYMPHOCYTES # BLD AUTO: 1.15 THOUSANDS/ΜL (ref 0.6–4.47)
LYMPHOCYTES NFR BLD AUTO: 20 % (ref 14–44)
MCH RBC QN AUTO: 27.1 PG (ref 26.8–34.3)
MCHC RBC AUTO-ENTMCNC: 30.2 G/DL (ref 31.4–37.4)
MCV RBC AUTO: 90 FL (ref 82–98)
MONOCYTES # BLD AUTO: 0.65 THOUSAND/ΜL (ref 0.17–1.22)
MONOCYTES NFR BLD AUTO: 11 % (ref 4–12)
NEUTROPHILS # BLD AUTO: 3.59 THOUSANDS/ΜL (ref 1.85–7.62)
NEUTS SEG NFR BLD AUTO: 64 % (ref 43–75)
NRBC BLD AUTO-RTO: 0 /100 WBCS
PLATELET # BLD AUTO: 218 THOUSANDS/UL (ref 149–390)
PMV BLD AUTO: 11.6 FL (ref 8.9–12.7)
RBC # BLD AUTO: 4.21 MILLION/UL (ref 3.88–5.62)
WBC # BLD AUTO: 5.68 THOUSAND/UL (ref 4.31–10.16)

## 2019-01-08 PROCEDURE — 36415 COLL VENOUS BLD VENIPUNCTURE: CPT

## 2019-01-08 PROCEDURE — 85025 COMPLETE CBC W/AUTO DIFF WBC: CPT

## 2019-01-15 ENCOUNTER — OFFICE VISIT (OUTPATIENT)
Dept: FAMILY MEDICINE CLINIC | Facility: CLINIC | Age: 84
End: 2019-01-15
Payer: MEDICARE

## 2019-01-15 VITALS
WEIGHT: 193 LBS | BODY MASS INDEX: 27.02 KG/M2 | SYSTOLIC BLOOD PRESSURE: 138 MMHG | DIASTOLIC BLOOD PRESSURE: 60 MMHG | HEART RATE: 76 BPM | HEIGHT: 71 IN

## 2019-01-15 DIAGNOSIS — D50.0 IRON DEFICIENCY ANEMIA DUE TO CHRONIC BLOOD LOSS: Primary | ICD-10-CM

## 2019-01-15 PROCEDURE — 99213 OFFICE O/P EST LOW 20 MIN: CPT | Performed by: FAMILY MEDICINE

## 2019-01-15 PROCEDURE — 1124F ACP DISCUSS-NO DSCNMKR DOCD: CPT | Performed by: FAMILY MEDICINE

## 2019-01-15 PROCEDURE — 1123F ACP DISCUSS/DSCN MKR DOCD: CPT | Performed by: FAMILY MEDICINE

## 2019-01-15 NOTE — PROGRESS NOTES
Assessment/Plan:    Anemia  The patient's hemoglobin has come up from 10 3 g to 11 4 g  He is feeling much better since taking supplemental iron        Diagnoses and all orders for this visit:    Iron deficiency anemia due to chronic blood loss          Subjective: Follow up to anemia  Review  results  -  [facility]     Patient ID: García Schmidt is a 80 y o  male  The patient came in today to recheck his iron after starting iron supplementation for iron deficiency anemia due to chronic blood loss  After starting the iron supplementation his hemoglobin came up from 10 3 to 11 4 g  He is feeling much better but is still very pale  His appetite has come back and he is doing well getting around  He has a return appointment in the next several months  The following portions of the patient's history were reviewed and updated as appropriate: allergies, current medications, past family history, past medical history, past social history, past surgical history and problem list     Review of Systems   Constitutional: Negative  HENT: Negative  Eyes: Negative  Respiratory: Negative  Cardiovascular: Negative  Gastrointestinal: Negative  Endocrine: Negative  Genitourinary: Negative  Musculoskeletal: Negative  Skin: Negative  Allergic/Immunologic: Negative  Neurological: Negative  Hematological: Negative  Psychiatric/Behavioral: Negative  Objective:      /60 (BP Location: Left arm, Patient Position: Sitting, Cuff Size: Large)   Pulse 76   Ht 5' 11" (1 803 m)   Wt 87 5 kg (193 lb)   BMI 26 92 kg/m²          Physical Exam   Constitutional: He is oriented to person, place, and time  He appears well-developed and well-nourished  HENT:   Head: Normocephalic  Right Ear: External ear normal    Left Ear: External ear normal    Mouth/Throat: Oropharynx is clear and moist    Eyes: Pupils are equal, round, and reactive to light   Conjunctivae and EOM are normal  Neck: Normal range of motion  Neck supple  Cardiovascular: Normal rate, regular rhythm and normal heart sounds  Pulmonary/Chest: Effort normal and breath sounds normal    Abdominal: Soft  Bowel sounds are normal    Musculoskeletal: Normal range of motion  Neurological: He is alert and oriented to person, place, and time  Skin: Skin is warm  Psychiatric: He has a normal mood and affect   His behavior is normal  Judgment and thought content normal

## 2019-01-15 NOTE — ASSESSMENT & PLAN NOTE
The patient's hemoglobin has come up from 10 3 g to 11 4 g  He is feeling much better since taking supplemental iron

## 2019-04-24 ENCOUNTER — APPOINTMENT (OUTPATIENT)
Dept: LAB | Facility: HOSPITAL | Age: 84
End: 2019-04-24
Payer: MEDICARE

## 2019-04-24 DIAGNOSIS — D64.9 ANEMIA, UNSPECIFIED TYPE: ICD-10-CM

## 2019-04-24 DIAGNOSIS — R73.9 HYPERGLYCEMIA: ICD-10-CM

## 2019-04-24 DIAGNOSIS — E78.2 MIXED HYPERLIPIDEMIA: ICD-10-CM

## 2019-04-24 DIAGNOSIS — I10 ESSENTIAL HYPERTENSION: ICD-10-CM

## 2019-04-24 LAB
ALBUMIN SERPL BCP-MCNC: 3.9 G/DL (ref 3.5–5)
ALP SERPL-CCNC: 59 U/L (ref 46–116)
ALT SERPL W P-5'-P-CCNC: 28 U/L (ref 12–78)
ANION GAP SERPL CALCULATED.3IONS-SCNC: 8 MMOL/L (ref 4–13)
AST SERPL W P-5'-P-CCNC: 29 U/L (ref 5–45)
BILIRUB SERPL-MCNC: 0.6 MG/DL (ref 0.2–1)
BUN SERPL-MCNC: 25 MG/DL (ref 5–25)
CALCIUM SERPL-MCNC: 8.7 MG/DL (ref 8.3–10.1)
CHLORIDE SERPL-SCNC: 106 MMOL/L (ref 100–108)
CO2 SERPL-SCNC: 28 MMOL/L (ref 21–32)
CREAT SERPL-MCNC: 1.03 MG/DL (ref 0.6–1.3)
GFR SERPL CREATININE-BSD FRML MDRD: 67 ML/MIN/1.73SQ M
GLUCOSE P FAST SERPL-MCNC: 90 MG/DL (ref 65–99)
LDLC SERPL DIRECT ASSAY-MCNC: 66 MG/DL (ref 0–100)
POTASSIUM SERPL-SCNC: 4.2 MMOL/L (ref 3.5–5.3)
PROT SERPL-MCNC: 7.3 G/DL (ref 6.4–8.2)
SODIUM SERPL-SCNC: 142 MMOL/L (ref 136–145)
TSH SERPL DL<=0.05 MIU/L-ACNC: 2.08 UIU/ML (ref 0.36–3.74)

## 2019-04-24 PROCEDURE — 80053 COMPREHEN METABOLIC PANEL: CPT

## 2019-04-24 PROCEDURE — 83721 ASSAY OF BLOOD LIPOPROTEIN: CPT

## 2019-04-24 PROCEDURE — 36415 COLL VENOUS BLD VENIPUNCTURE: CPT

## 2019-04-24 PROCEDURE — 84443 ASSAY THYROID STIM HORMONE: CPT

## 2019-04-30 ENCOUNTER — OFFICE VISIT (OUTPATIENT)
Dept: FAMILY MEDICINE CLINIC | Facility: CLINIC | Age: 84
End: 2019-04-30
Payer: MEDICARE

## 2019-04-30 VITALS
HEIGHT: 71 IN | HEART RATE: 60 BPM | SYSTOLIC BLOOD PRESSURE: 120 MMHG | WEIGHT: 192 LBS | DIASTOLIC BLOOD PRESSURE: 64 MMHG | BODY MASS INDEX: 26.88 KG/M2

## 2019-04-30 DIAGNOSIS — R73.9 HYPERGLYCEMIA: ICD-10-CM

## 2019-04-30 DIAGNOSIS — D50.0 IRON DEFICIENCY ANEMIA DUE TO CHRONIC BLOOD LOSS: Primary | ICD-10-CM

## 2019-04-30 DIAGNOSIS — E78.2 MIXED HYPERLIPIDEMIA: ICD-10-CM

## 2019-04-30 DIAGNOSIS — I10 ESSENTIAL HYPERTENSION: ICD-10-CM

## 2019-04-30 DIAGNOSIS — Z23 NEED FOR TETANUS BOOSTER: ICD-10-CM

## 2019-04-30 PROBLEM — D64.9 SEVERE ANEMIA: Status: RESOLVED | Noted: 2018-11-13 | Resolved: 2019-04-30

## 2019-04-30 PROBLEM — D62 ACUTE BLOOD LOSS ANEMIA: Status: RESOLVED | Noted: 2018-11-13 | Resolved: 2019-04-30

## 2019-04-30 LAB — SL AMB POCT HGB: 9.8

## 2019-04-30 PROCEDURE — 85018 HEMOGLOBIN: CPT | Performed by: FAMILY MEDICINE

## 2019-04-30 PROCEDURE — G0439 PPPS, SUBSEQ VISIT: HCPCS | Performed by: FAMILY MEDICINE

## 2019-04-30 PROCEDURE — 99214 OFFICE O/P EST MOD 30 MIN: CPT | Performed by: FAMILY MEDICINE

## 2019-04-30 PROCEDURE — 90471 IMMUNIZATION ADMIN: CPT

## 2019-04-30 PROCEDURE — 90714 TD VACC NO PRESV 7 YRS+ IM: CPT

## 2019-05-13 ENCOUNTER — APPOINTMENT (OUTPATIENT)
Dept: LAB | Facility: HOSPITAL | Age: 84
End: 2019-05-13
Payer: MEDICARE

## 2019-05-13 DIAGNOSIS — D50.0 IRON DEFICIENCY ANEMIA DUE TO CHRONIC BLOOD LOSS: ICD-10-CM

## 2019-05-13 LAB
BASOPHILS # BLD AUTO: 0.04 THOUSANDS/ΜL (ref 0–0.1)
BASOPHILS NFR BLD AUTO: 1 % (ref 0–1)
EOSINOPHIL # BLD AUTO: 0.24 THOUSAND/ΜL (ref 0–0.61)
EOSINOPHIL NFR BLD AUTO: 5 % (ref 0–6)
ERYTHROCYTE [DISTWIDTH] IN BLOOD BY AUTOMATED COUNT: 14.6 % (ref 11.6–15.1)
HCT VFR BLD AUTO: 35.5 % (ref 36.5–49.3)
HGB BLD-MCNC: 10.9 G/DL (ref 12–17)
IMM GRANULOCYTES # BLD AUTO: 0.01 THOUSAND/UL (ref 0–0.2)
IMM GRANULOCYTES NFR BLD AUTO: 0 % (ref 0–2)
LYMPHOCYTES # BLD AUTO: 1.04 THOUSANDS/ΜL (ref 0.6–4.47)
LYMPHOCYTES NFR BLD AUTO: 22 % (ref 14–44)
MCH RBC QN AUTO: 28.2 PG (ref 26.8–34.3)
MCHC RBC AUTO-ENTMCNC: 30.7 G/DL (ref 31.4–37.4)
MCV RBC AUTO: 92 FL (ref 82–98)
MONOCYTES # BLD AUTO: 0.5 THOUSAND/ΜL (ref 0.17–1.22)
MONOCYTES NFR BLD AUTO: 10 % (ref 4–12)
NEUTROPHILS # BLD AUTO: 2.99 THOUSANDS/ΜL (ref 1.85–7.62)
NEUTS SEG NFR BLD AUTO: 62 % (ref 43–75)
NRBC BLD AUTO-RTO: 0 /100 WBCS
PLATELET # BLD AUTO: 200 THOUSANDS/UL (ref 149–390)
PMV BLD AUTO: 11.7 FL (ref 8.9–12.7)
RBC # BLD AUTO: 3.86 MILLION/UL (ref 3.88–5.62)
WBC # BLD AUTO: 4.82 THOUSAND/UL (ref 4.31–10.16)

## 2019-05-13 PROCEDURE — 85025 COMPLETE CBC W/AUTO DIFF WBC: CPT

## 2019-05-13 PROCEDURE — 36415 COLL VENOUS BLD VENIPUNCTURE: CPT

## 2019-07-05 ENCOUNTER — TELEPHONE (OUTPATIENT)
Dept: FAMILY MEDICINE CLINIC | Facility: CLINIC | Age: 84
End: 2019-07-05

## 2019-07-05 NOTE — TELEPHONE ENCOUNTER
Patient has an appointment with BT in November and would like to know if he wants any bw done  Please review   Thank you

## 2019-07-11 DIAGNOSIS — R73.9 HYPERGLYCEMIA: ICD-10-CM

## 2019-07-11 DIAGNOSIS — I25.10 ARTERIOSCLEROTIC CARDIOVASCULAR DISEASE: ICD-10-CM

## 2019-07-11 DIAGNOSIS — E83.51 HYPOCALCEMIA: ICD-10-CM

## 2019-07-11 DIAGNOSIS — D50.0 IRON DEFICIENCY ANEMIA DUE TO CHRONIC BLOOD LOSS: ICD-10-CM

## 2019-07-11 DIAGNOSIS — E78.2 MIXED HYPERLIPIDEMIA: ICD-10-CM

## 2019-07-11 DIAGNOSIS — I10 ESSENTIAL HYPERTENSION: Primary | ICD-10-CM

## 2019-11-19 ENCOUNTER — APPOINTMENT (OUTPATIENT)
Dept: LAB | Facility: HOSPITAL | Age: 84
End: 2019-11-19
Payer: MEDICARE

## 2019-11-19 DIAGNOSIS — E83.51 HYPOCALCEMIA: ICD-10-CM

## 2019-11-19 DIAGNOSIS — R73.9 HYPERGLYCEMIA: ICD-10-CM

## 2019-11-19 DIAGNOSIS — E78.2 MIXED HYPERLIPIDEMIA: ICD-10-CM

## 2019-11-19 DIAGNOSIS — D50.0 IRON DEFICIENCY ANEMIA DUE TO CHRONIC BLOOD LOSS: ICD-10-CM

## 2019-11-19 DIAGNOSIS — I10 ESSENTIAL HYPERTENSION: ICD-10-CM

## 2019-11-19 DIAGNOSIS — I25.10 ARTERIOSCLEROTIC CARDIOVASCULAR DISEASE: ICD-10-CM

## 2019-11-19 LAB
ALBUMIN SERPL BCP-MCNC: 4.3 G/DL (ref 3.5–5)
ALP SERPL-CCNC: 60 U/L (ref 46–116)
ALT SERPL W P-5'-P-CCNC: 27 U/L (ref 12–78)
ANION GAP SERPL CALCULATED.3IONS-SCNC: 5 MMOL/L (ref 4–13)
AST SERPL W P-5'-P-CCNC: 28 U/L (ref 5–45)
BASOPHILS # BLD AUTO: 0.05 THOUSANDS/ΜL (ref 0–0.1)
BASOPHILS NFR BLD AUTO: 1 % (ref 0–1)
BILIRUB SERPL-MCNC: 0.63 MG/DL (ref 0.2–1)
BUN SERPL-MCNC: 26 MG/DL (ref 5–25)
CALCIUM SERPL-MCNC: 9 MG/DL (ref 8.3–10.1)
CHLORIDE SERPL-SCNC: 111 MMOL/L (ref 100–108)
CO2 SERPL-SCNC: 28 MMOL/L (ref 21–32)
CREAT SERPL-MCNC: 1.05 MG/DL (ref 0.6–1.3)
EOSINOPHIL # BLD AUTO: 0.28 THOUSAND/ΜL (ref 0–0.61)
EOSINOPHIL NFR BLD AUTO: 6 % (ref 0–6)
ERYTHROCYTE [DISTWIDTH] IN BLOOD BY AUTOMATED COUNT: 14.1 % (ref 11.6–15.1)
GFR SERPL CREATININE-BSD FRML MDRD: 65 ML/MIN/1.73SQ M
GLUCOSE P FAST SERPL-MCNC: 91 MG/DL (ref 65–99)
HCT VFR BLD AUTO: 38.2 % (ref 36.5–49.3)
HGB BLD-MCNC: 11.9 G/DL (ref 12–17)
IMM GRANULOCYTES # BLD AUTO: 0.02 THOUSAND/UL (ref 0–0.2)
IMM GRANULOCYTES NFR BLD AUTO: 0 % (ref 0–2)
LDLC SERPL DIRECT ASSAY-MCNC: 69 MG/DL (ref 0–100)
LYMPHOCYTES # BLD AUTO: 1.34 THOUSANDS/ΜL (ref 0.6–4.47)
LYMPHOCYTES NFR BLD AUTO: 28 % (ref 14–44)
MCH RBC QN AUTO: 28.7 PG (ref 26.8–34.3)
MCHC RBC AUTO-ENTMCNC: 31.2 G/DL (ref 31.4–37.4)
MCV RBC AUTO: 92 FL (ref 82–98)
MONOCYTES # BLD AUTO: 0.51 THOUSAND/ΜL (ref 0.17–1.22)
MONOCYTES NFR BLD AUTO: 11 % (ref 4–12)
NEUTROPHILS # BLD AUTO: 2.68 THOUSANDS/ΜL (ref 1.85–7.62)
NEUTS SEG NFR BLD AUTO: 54 % (ref 43–75)
NRBC BLD AUTO-RTO: 0 /100 WBCS
PLATELET # BLD AUTO: 176 THOUSANDS/UL (ref 149–390)
PMV BLD AUTO: 11.9 FL (ref 8.9–12.7)
POTASSIUM SERPL-SCNC: 4.4 MMOL/L (ref 3.5–5.3)
PROT SERPL-MCNC: 7.4 G/DL (ref 6.4–8.2)
RBC # BLD AUTO: 4.14 MILLION/UL (ref 3.88–5.62)
SODIUM SERPL-SCNC: 144 MMOL/L (ref 136–145)
WBC # BLD AUTO: 4.88 THOUSAND/UL (ref 4.31–10.16)

## 2019-11-19 PROCEDURE — 36415 COLL VENOUS BLD VENIPUNCTURE: CPT

## 2019-11-19 PROCEDURE — 83721 ASSAY OF BLOOD LIPOPROTEIN: CPT

## 2019-11-19 PROCEDURE — 85025 COMPLETE CBC W/AUTO DIFF WBC: CPT

## 2019-11-19 PROCEDURE — 80053 COMPREHEN METABOLIC PANEL: CPT

## 2020-09-14 ENCOUNTER — APPOINTMENT (OUTPATIENT)
Dept: LAB | Facility: HOSPITAL | Age: 85
End: 2020-09-14
Payer: MEDICARE

## 2020-09-14 ENCOUNTER — TRANSCRIBE ORDERS (OUTPATIENT)
Dept: ADMINISTRATIVE | Facility: HOSPITAL | Age: 85
End: 2020-09-14

## 2020-09-14 DIAGNOSIS — I48.21 PERMANENT ATRIAL FIBRILLATION (HCC): ICD-10-CM

## 2020-09-14 DIAGNOSIS — Z95.2 HEART VALVE REPLACED BY TRANSPLANT: ICD-10-CM

## 2020-09-14 DIAGNOSIS — Z95.1 POSTSURGICAL AORTOCORONARY BYPASS STATUS: Primary | ICD-10-CM

## 2020-09-14 DIAGNOSIS — Z95.1 POSTSURGICAL AORTOCORONARY BYPASS STATUS: ICD-10-CM

## 2020-09-14 LAB
ALBUMIN SERPL BCP-MCNC: 4.1 G/DL (ref 3.5–5)
ALP SERPL-CCNC: 63 U/L (ref 46–116)
ALT SERPL W P-5'-P-CCNC: 21 U/L (ref 12–78)
ANION GAP SERPL CALCULATED.3IONS-SCNC: 3 MMOL/L (ref 4–13)
AST SERPL W P-5'-P-CCNC: 24 U/L (ref 5–45)
BASOPHILS # BLD AUTO: 0.08 THOUSANDS/ΜL (ref 0–0.1)
BASOPHILS NFR BLD AUTO: 2 % (ref 0–1)
BILIRUB SERPL-MCNC: 0.82 MG/DL (ref 0.2–1)
BUN SERPL-MCNC: 27 MG/DL (ref 5–25)
CALCIUM SERPL-MCNC: 8.8 MG/DL (ref 8.3–10.1)
CHLORIDE SERPL-SCNC: 110 MMOL/L (ref 100–108)
CO2 SERPL-SCNC: 29 MMOL/L (ref 21–32)
CREAT SERPL-MCNC: 1 MG/DL (ref 0.6–1.3)
EOSINOPHIL # BLD AUTO: 0.55 THOUSAND/ΜL (ref 0–0.61)
EOSINOPHIL NFR BLD AUTO: 10 % (ref 0–6)
ERYTHROCYTE [DISTWIDTH] IN BLOOD BY AUTOMATED COUNT: 14.6 % (ref 11.6–15.1)
GFR SERPL CREATININE-BSD FRML MDRD: 68 ML/MIN/1.73SQ M
GLUCOSE P FAST SERPL-MCNC: 100 MG/DL (ref 65–99)
HCT VFR BLD AUTO: 37.2 % (ref 36.5–49.3)
HGB BLD-MCNC: 12 G/DL (ref 12–17)
IMM GRANULOCYTES # BLD AUTO: 0.01 THOUSAND/UL (ref 0–0.2)
IMM GRANULOCYTES NFR BLD AUTO: 0 % (ref 0–2)
LYMPHOCYTES # BLD AUTO: 1.27 THOUSANDS/ΜL (ref 0.6–4.47)
LYMPHOCYTES NFR BLD AUTO: 24 % (ref 14–44)
MCH RBC QN AUTO: 29.1 PG (ref 26.8–34.3)
MCHC RBC AUTO-ENTMCNC: 32.3 G/DL (ref 31.4–37.4)
MCV RBC AUTO: 90 FL (ref 82–98)
MONOCYTES # BLD AUTO: 0.57 THOUSAND/ΜL (ref 0.17–1.22)
MONOCYTES NFR BLD AUTO: 11 % (ref 4–12)
NEUTROPHILS # BLD AUTO: 2.81 THOUSANDS/ΜL (ref 1.85–7.62)
NEUTS SEG NFR BLD AUTO: 53 % (ref 43–75)
NRBC BLD AUTO-RTO: 0 /100 WBCS
PLATELET # BLD AUTO: 164 THOUSANDS/UL (ref 149–390)
PMV BLD AUTO: 11.5 FL (ref 8.9–12.7)
POTASSIUM SERPL-SCNC: 4.1 MMOL/L (ref 3.5–5.3)
PROT SERPL-MCNC: 7.4 G/DL (ref 6.4–8.2)
RBC # BLD AUTO: 4.12 MILLION/UL (ref 3.88–5.62)
SODIUM SERPL-SCNC: 142 MMOL/L (ref 136–145)
WBC # BLD AUTO: 5.29 THOUSAND/UL (ref 4.31–10.16)

## 2020-09-14 PROCEDURE — 85025 COMPLETE CBC W/AUTO DIFF WBC: CPT

## 2020-09-14 PROCEDURE — 80053 COMPREHEN METABOLIC PANEL: CPT

## 2020-09-14 PROCEDURE — 36415 COLL VENOUS BLD VENIPUNCTURE: CPT

## 2020-09-16 ENCOUNTER — CLINICAL SUPPORT (OUTPATIENT)
Dept: FAMILY MEDICINE CLINIC | Facility: CLINIC | Age: 85
End: 2020-09-16
Payer: MEDICARE

## 2020-09-16 DIAGNOSIS — Z23 NEED FOR VACCINATION: Primary | ICD-10-CM

## 2020-09-16 PROCEDURE — 90662 IIV NO PRSV INCREASED AG IM: CPT

## 2020-09-16 PROCEDURE — G0008 ADMIN INFLUENZA VIRUS VAC: HCPCS

## 2020-09-30 ENCOUNTER — OFFICE VISIT (OUTPATIENT)
Dept: FAMILY MEDICINE CLINIC | Facility: CLINIC | Age: 85
End: 2020-09-30
Payer: MEDICARE

## 2020-09-30 VITALS
WEIGHT: 182.4 LBS | TEMPERATURE: 96 F | HEIGHT: 69 IN | BODY MASS INDEX: 27.02 KG/M2 | DIASTOLIC BLOOD PRESSURE: 70 MMHG | HEART RATE: 87 BPM | OXYGEN SATURATION: 97 % | SYSTOLIC BLOOD PRESSURE: 134 MMHG

## 2020-09-30 DIAGNOSIS — E78.2 MIXED HYPERLIPIDEMIA: ICD-10-CM

## 2020-09-30 DIAGNOSIS — I25.10 ARTERIOSCLEROTIC CARDIOVASCULAR DISEASE: ICD-10-CM

## 2020-09-30 DIAGNOSIS — Z00.00 MEDICARE ANNUAL WELLNESS VISIT, SUBSEQUENT: Primary | ICD-10-CM

## 2020-09-30 DIAGNOSIS — I10 ESSENTIAL HYPERTENSION: ICD-10-CM

## 2020-09-30 DIAGNOSIS — D50.0 IRON DEFICIENCY ANEMIA DUE TO CHRONIC BLOOD LOSS: ICD-10-CM

## 2020-09-30 PROCEDURE — G0439 PPPS, SUBSEQ VISIT: HCPCS | Performed by: FAMILY MEDICINE

## 2020-09-30 PROCEDURE — 99214 OFFICE O/P EST MOD 30 MIN: CPT | Performed by: FAMILY MEDICINE

## 2020-09-30 NOTE — PATIENT INSTRUCTIONS

## 2020-09-30 NOTE — PROGRESS NOTES
[facility]        NAME: David Rose is a 80 y o  male  : 1935    MRN: [id number]  DATE: 2020  TIME: 1:20 PM    Assessment and Plan   Medicare annual wellness visit, subsequent [Z00 00]  1  Medicare annual wellness visit, subsequent     2  Arteriosclerotic cardiovascular disease     3  Essential hypertension     4  Iron deficiency anemia due to chronic blood loss     5  Mixed hyperlipidemia           Patient Instructions     Patient Instructions       Medicare Preventive Visit Patient Instructions  Thank you for completing your Welcome to Medicare Visit or Medicare Annual Wellness Visit today  Your next wellness visit will be due in one year (2021)  The screening/preventive services that you may require over the next 5-10 years are detailed below  Some tests may not apply to you based off risk factors and/or age  Screening tests ordered at today's visit but not completed yet may show as past due  Also, please note that scanned in results may not display below  Preventive Screenings:  Service Recommendations Previous Testing/Comments   Colorectal Cancer Screening  · Colonoscopy    · Fecal Occult Blood Test (FOBT)/Fecal Immunochemical Test (FIT)  · Fecal DNA/Cologuard Test  · Flexible Sigmoidoscopy Age: 54-65 years old   Colonoscopy: every 10 years (May be performed more frequently if at higher risk)  OR  FOBT/FIT: every 1 year  OR  Cologuard: every 3 years  OR  Sigmoidoscopy: every 5 years  Screening may be recommended earlier than age 48 if at higher risk for colorectal cancer  Also, an individualized decision between you and your healthcare provider will decide whether screening between the ages of 74-80 would be appropriate   Colonoscopy: Not on file  FOBT/FIT: Not on file  Cologuard: Not on file  Sigmoidoscopy: Not on file    Screening Not Indicated     Prostate Cancer Screening Individualized decision between patient and health care provider in men between ages of 53-78   Medicare will cover every 12 months beginning on the day after your 50th birthday PSA: 0 7 ng/mL     Screening Not Indicated     Hepatitis C Screening Once for adults born between 1945 and 1965  More frequently in patients at high risk for Hepatitis C Hep C Antibody: Not on file       Diabetes Screening 1-2 times per year if you're at risk for diabetes or have pre-diabetes Fasting glucose: 100 mg/dL   A1C: 5 7 %    Screening Current   Cholesterol Screening Once every 5 years if you don't have a lipid disorder  May order more often based on risk factors  Lipid panel: 10/15/2018    Screening Not Indicated  History Lipid Disorder      Other Preventive Screenings Covered by Medicare:  1  Abdominal Aortic Aneurysm (AAA) Screening: covered once if your at risk  You're considered to be at risk if you have a family history of AAA or a male between the age of 73-68 who smoking at least 100 cigarettes in your lifetime  2  Lung Cancer Screening: covers low dose CT scan once per year if you meet all of the following conditions: (1) Age 50-69; (2) No signs or symptoms of lung cancer; (3) Current smoker or have quit smoking within the last 15 years; (4) You have a tobacco smoking history of at least 30 pack years (packs per day x number of years you smoked); (5) You get a written order from a healthcare provider  3  Glaucoma Screening: covered annually if you're considered high risk: (1) You have diabetes OR (2) Family history of glaucoma OR (3)  aged 48 and older OR (3)  American aged 72 and older  3  Osteoporosis Screening: covered every 2 years if you meet one of the following conditions: (1) Have a vertebral abnormality; (2) On glucocorticoid therapy for more than 3 months; (3) Have primary hyperparathyroidism; (4) On osteoporosis medications and need to assess response to drug therapy  5  HIV Screening: covered annually if you're between the age of 12-76   Also covered annually if you are younger than 13 and older than 72 with risk factors for HIV infection  For pregnant patients, it is covered up to 3 times per pregnancy  Immunizations:  Immunization Recommendations   Influenza Vaccine Annual influenza vaccination during flu season is recommended for all persons aged >= 6 months who do not have contraindications   Pneumococcal Vaccine (Prevnar and Pneumovax)  * Prevnar = PCV13  * Pneumovax = PPSV23 Adults 25-60 years old: 1-3 doses may be recommended based on certain risk factors  Adults 72 years old: Prevnar (PCV13) vaccine recommended followed by Pneumovax (PPSV23) vaccine  If already received PPSV23 since turning 65, then PCV13 recommended at least one year after PPSV23 dose  Hepatitis B Vaccine 3 dose series if at intermediate or high risk (ex: diabetes, end stage renal disease, liver disease)   Tetanus (Td) Vaccine - COST NOT COVERED BY MEDICARE PART B Following completion of primary series, a booster dose should be given every 10 years to maintain immunity against tetanus  Td may also be given as tetanus wound prophylaxis  Tdap Vaccine - COST NOT COVERED BY MEDICARE PART B Recommended at least once for all adults  For pregnant patients, recommended with each pregnancy  Shingles Vaccine (Shingrix) - COST NOT COVERED BY MEDICARE PART B  2 shot series recommended in those aged 48 and above     Health Maintenance Due:  There are no preventive care reminders to display for this patient  Immunizations Due:  There are no preventive care reminders to display for this patient  Advance Directives   What are advance directives? Advance directives are legal documents that state your wishes and plans for medical care  These plans are made ahead of time in case you lose your ability to make decisions for yourself  Advance directives can apply to any medical decision, such as the treatments you want, and if you want to donate organs  What are the types of advance directives?   There are many types of advance directives, and each state has rules about how to use them  You may choose a combination of any of the following:  · Living will: This is a written record of the treatment you want  You can also choose which treatments you do not want, which to limit, and which to stop at a certain time  This includes surgery, medicine, IV fluid, and tube feedings  · Durable power of  for healthcare [facility]): This is a written record that states who you want to make healthcare choices for you when you are unable to make them for yourself  This person, called a proxy, is usually a family member or a friend  You may choose more than 1 proxy  · Do not resuscitate (DNR) order:  A DNR order is used in case your heart stops beating or you stop breathing  It is a request not to have certain forms of treatment, such as CPR  A DNR order may be included in other types of advance directives  · Medical directive: This covers the care that you want if you are in a coma, near death, or unable to make decisions for yourself  You can list the treatments you want for each condition  Treatment may include pain medicine, surgery, blood transfusions, dialysis, IV or tube feedings, and a ventilator (breathing machine)  · Values history: This document has questions about your views, beliefs, and how you feel and think about life  This information can help others choose the care that you would choose  Why are advance directives important? An advance directive helps you control your care  Although spoken wishes may be used, it is better to have your wishes written down  Spoken wishes can be misunderstood, or not followed  Treatments may be given even if you do not want them  An advance directive may make it easier for your family to make difficult choices about your care     Weight Management   Why it is important to manage your weight:  Being overweight increases your risk of health conditions such as heart disease, high blood pressure, type 2 diabetes, and certain types of cancer  It can also increase your risk for osteoarthritis, sleep apnea, and other respiratory problems  Aim for a slow, steady weight loss  Even a small amount of weight loss can lower your risk of health problems  How to lose weight safely:  A safe and healthy way to lose weight is to eat fewer calories and get regular exercise  You can lose up about 1 pound a week by decreasing the number of calories you eat by 500 calories each day  Healthy meal plan for weight management:  A healthy meal plan includes a variety of foods, contains fewer calories, and helps you stay healthy  A healthy meal plan includes the following:  · Eat whole-grain foods more often  A healthy meal plan should contain fiber  Fiber is the part of grains, fruits, and vegetables that is not broken down by your body  Whole-grain foods are healthy and provide extra fiber in your diet  Some examples of whole-grain foods are whole-wheat breads and pastas, oatmeal, brown rice, and bulgur  · Eat a variety of vegetables every day  Include dark, leafy greens such as spinach, kale, axel greens, and mustard greens  Eat yellow and orange vegetables such as carrots, sweet potatoes, and winter squash  · Eat a variety of fruits every day  Choose fresh or canned fruit (canned in its own juice or light syrup) instead of juice  Fruit juice has very little or no fiber  · Eat low-fat dairy foods  Drink fat-free (skim) milk or 1% milk  Eat fat-free yogurt and low-fat cottage cheese  Try low-fat cheeses such as mozzarella and other reduced-fat cheeses  · Choose meat and other protein foods that are low in fat  Choose beans or other legumes such as split peas or lentils  Choose fish, skinless poultry (chicken or turkey), or lean cuts of red meat (beef or pork)  Before you cook meat or poultry, cut off any visible fat  · Use less fat and oil  Try baking foods instead of frying them   Add less fat, such as margarine, sour cream, regular salad dressing and mayonnaise to foods  Eat fewer high-fat foods  Some examples of high-fat foods include french fries, doughnuts, ice cream, and cakes  · Eat fewer sweets  Limit foods and drinks that are high in sugar  This includes candy, cookies, regular soda, and sweetened drinks  Exercise:  Exercise at least 30 minutes per day on most days of the week  Some examples of exercise include walking, biking, dancing, and swimming  You can also fit in more physical activity by taking the stairs instead of the elevator or parking farther away from stores  Ask your healthcare provider about the best exercise plan for you  © Copyright [employer] 2018 Information is for End User's use only and may not be sold, redistributed or otherwise used for commercial purposes  All illustrations and images included in CareNotes® are the copyrighted property of A D [employer] M , Inc  or [address] [place] [address]          Chief Complaint     Chief Complaint   Patient presents with    Medicare Wellness Visit     SUB         History of Present Illness       F/u assessed med cond--stable      Review of Systems   Review of Systems   Constitutional: Negative for fatigue, fever and unexpected weight change  HENT: Negative for congestion, sinus pain and sore throat  Eyes: Negative for visual disturbance  Respiratory: Negative for shortness of breath and wheezing  Cardiovascular: Negative for chest pain and palpitations  Gastrointestinal: Negative for abdominal pain, nausea and vomiting  Musculoskeletal: Negative  Negative for arthralgias and myalgias  Neurological: Negative for syncope, weakness and numbness  Psychiatric/Behavioral: Negative  Negative for confusion, dysphoric mood and suicidal ideas           Current Medications       Current Outpatient Medications:     atorvastatin (LIPITOR) 10 mg tablet, Take by mouth, Disp: , Rfl:     ferrous sulfate (IRON SUPPLEMENT) 325 (39 Fe) mg tablet, Take by mouth, Disp: , Rfl:     furosemide (LASIX) 20 mg tablet, Take by mouth, Disp: , Rfl:     latanoprost (XALATAN) 0 005 % ophthalmic solution, Administer 1 drop to both eyes daily at bedtime  , Disp: , Rfl:     lisinopril (ZESTRIL) 5 mg tablet, Take by mouth, Disp: , Rfl:     metoprolol tartrate (LOPRESSOR) 25 mg tablet, Take by mouth, Disp: , Rfl:     potassium chloride (K-DUR,KLOR-CON) 20 mEq tablet, Take by mouth, Disp: , Rfl:     warfarin (COUMADIN) 5 mg tablet, Take by mouth, Disp: , Rfl:     Current Allergies     Allergies as of 09/30/2020 - Reviewed 09/30/2020   Allergen Reaction Noted    Adhesive [medical tape] Rash 01/15/2019            The following portions of the patient's history were reviewed and updated as appropriate: allergies, current medications, past family history, past medical history, past social history, past surgical history and problem list      No past medical history on file  Past Surgical History:   Procedure Laterality Date    COLONOSCOPY N/A 11/15/2018    Procedure: COLONOSCOPY;  Surgeon: Marcelina Lindo MD;  Location:  MAIN OR;  Service: Gastroenterology    CORONARY ARTERY BYPASS GRAFT      ESOPHAGOGASTRODUODENOSCOPY N/A 11/14/2018    Procedure: ESOPHAGOGASTRODUODENOSCOPY (EGD); Surgeon: Franklin Schwartz MD;  Location:  MAIN OR;  Service: Gastroenterology    MITRAL VALVE REPLACEMENT         Family History   Problem Relation Age of Onset    Alzheimer's disease Mother          Medications have been verified  Objective   /70   Pulse 87   Temp (!) 96 °F (35 6 °C) (Temporal)   Ht 5' 8 75" (1 746 m)   Wt 82 7 kg (182 lb 6 4 oz)   SpO2 97%   BMI 27 13 kg/m²        Physical Exam     Physical Exam  Constitutional:       Appearance: He is well-developed  HENT:      Right Ear: Ear canal normal  Tympanic membrane is not injected  Left Ear: Ear canal normal  Tympanic membrane is not injected        Nose: Nose normal    Eyes: General:         Right eye: No discharge  Left eye: No discharge  Conjunctiva/sclera: Conjunctivae normal       Pupils: Pupils are equal, round, and reactive to light  Neck:      Musculoskeletal: Normal range of motion and neck supple  Thyroid: No thyromegaly  Cardiovascular:      Rate and Rhythm: Normal rate and regular rhythm  Heart sounds: Normal heart sounds  No murmur  Pulmonary:      Effort: Pulmonary effort is normal  No respiratory distress  Breath sounds: Normal breath sounds  No wheezing  Abdominal:      General: Bowel sounds are normal  There is no distension  Palpations: Abdomen is soft  Tenderness: There is no abdominal tenderness  Musculoskeletal: Normal range of motion  Lymphadenopathy:      Cervical: No cervical adenopathy  Skin:     General: Skin is warm and dry  Neurological:      Mental Status: He is alert and oriented to person, place, and time  He is not disoriented  Sensory: No sensory deficit  Gait: Gait normal       Deep Tendon Reflexes: Reflexes are normal and symmetric  Psychiatric:         Speech: Speech normal          Behavior: Behavior normal          Thought Content:  Thought content normal          Judgment: Judgment normal

## 2020-09-30 NOTE — PROGRESS NOTES
Assessment and Plan:     Problem List Items Addressed This Visit        Cardiovascular and Mediastinum    Arteriosclerotic cardiovascular disease    Essential hypertension       Other    Anemia    Mixed hyperlipidemia      Other Visit Diagnoses     Medicare annual wellness visit, subsequent    -  Primary        BMI Counseling: Body mass index is 27 13 kg/m²  The BMI is above normal  Nutrition recommendations include decreasing portion sizes  Exercise recommendations include moderate physical activity 150 minutes/week  No pharmacotherapy was ordered  Preventive health issues were discussed with patient, and age appropriate screening tests were ordered as noted in patient's After Visit Summary  Personalized health advice and appropriate referrals for health education or preventive services given if needed, as noted in patient's After Visit Summary  History of Present Illness:     Patient presents for Medicare Annual Wellness visit    Patient Care Team:  Mildred Pagan MD as PCP - General (Family Medicine)  MD Sunni Gomes PA-C Lorenz Moan, MD as Endoscopist     Problem List:     Patient Active Problem List   Diagnosis    Anemia    Anxiety    Arteriosclerotic cardiovascular disease    Arthritis    Herpes zoster    Hyperglycemia    Mixed hyperlipidemia    Essential hypertension    Hypocalcemia    Insomnia    GI bleed    Coagulopathy (HCC)    Paroxysmal atrial fibrillation [facility])      Past Medical and Surgical History:     No past medical history on file  Past Surgical History:   Procedure Laterality Date    COLONOSCOPY N/A 11/15/2018    Procedure: COLONOSCOPY;  Surgeon: Leighton Appiah MD;  Location:  MAIN OR;  Service: Gastroenterology    CORONARY ARTERY BYPASS GRAFT      ESOPHAGOGASTRODUODENOSCOPY N/A 11/14/2018    Procedure: ESOPHAGOGASTRODUODENOSCOPY (EGD);   Surgeon: Jose Ballesteros MD;  Location:  MAIN OR;  Service: Gastroenterology    MITRAL VALVE REPLACEMENT Family History:     Family History   Problem Relation Age of Onset    Alzheimer's disease Mother       Social History:        Social History     Socioeconomic History    Marital status: /Civil Union     Spouse name: Not on file    Number of children: Not on file    Years of education: Not on file    Highest education level: Not on file   Occupational History    Occupation: retired   Social Needs    Financial resource strain: Not on file    Food insecurity     Worry: Not on file     Inability: Not on file   [employer] needs     Medical: Not on file     Non-medical: Not on file   Tobacco Use    Smoking status: Former Smoker     Last attempt to quit: 1985     Years since quittin 8    Smokeless tobacco: Never Used   Substance and Sexual Activity    Alcohol use: Yes     Comment: occasional    Drug use: No    Sexual activity: Not on file   Lifestyle    Physical activity     Days per week: Not on file     Minutes per session: Not on file    Stress: Not on file   Relationships    Social connections     Talks on phone: Not on file     Gets together: Not on file     Attends Protestant service: Not on file     Active member of club or organization: Not on file     Attends meetings of clubs or organizations: Not on file     Relationship status: Not on file    Intimate partner violence     Fear of current or ex partner: Not on file     Emotionally abused: Not on file     Physically abused: Not on file     Forced sexual activity: Not on file   Other Topics Concern    Not on file   Social History Narrative    No smoke exposure      Medications and Allergies:     Current Outpatient Medications   Medication Sig Dispense Refill    atorvastatin (LIPITOR) 10 mg tablet Take by mouth      ferrous sulfate (IRON SUPPLEMENT) 325 (65 Fe) mg tablet Take by mouth      furosemide (LASIX) 20 mg tablet Take by mouth      latanoprost (XALATAN) 0 005 % ophthalmic solution Administer 1 drop to both eyes daily at bedtime        lisinopril (ZESTRIL) 5 mg tablet Take by mouth      metoprolol tartrate (LOPRESSOR) 25 mg tablet Take by mouth      potassium chloride (K-DUR,KLOR-CON) 20 mEq tablet Take by mouth      warfarin (COUMADIN) 5 mg tablet Take by mouth       No current facility-administered medications for this visit  Allergies   Allergen Reactions    Adhesive [Medical Tape] Rash      Immunizations:     Immunization History   Administered Date(s) Administered    Influenza Quadrivalent Preservative Free 3 years and older IM 10/17/2014    Influenza Quadrivalent, 6-35 Months IM 09/18/2015    Influenza Split High Dose Preservative Free IM 09/20/2016, 10/04/2017    Influenza TIV (IM) 11/01/2011, 12/03/2012, 12/02/2013    Influenza, high dose seasonal 0 7 mL 10/23/2018, 09/16/2020    Pneumococcal Conjugate 13-Valent 10/23/2018    Pneumococcal Polysaccharide PPV23 10/01/2001    TD (adult) Preservative Free 05/01/2008, 04/30/2019    Td (adult), adsorbed 04/30/2019      Health Maintenance: There are no preventive care reminders to display for this patient  There are no preventive care reminders to display for this patient  Medicare Health Risk Assessment:     /70   Pulse 87   Temp (!) 96 °F (35 6 °C) (Temporal)   Ht 5' 8 75" (1 746 m)   Wt 82 7 kg (182 lb 6 4 oz)   SpO2 97%   BMI 27 13 kg/m²      Linda Capone is here for his Subsequent Wellness visit  Last Medicare Wellness visit information reviewed, patient interviewed and updates made to the record today  Health Risk Assessment:   Patient rates overall health as very good  Patient feels that their physical health rating is same  Eyesight was rated as same  Hearing was rated as same  Patient feels that their emotional and mental health rating is same  Pain experienced in the last 7 days has been none  Patient states that he has experienced no weight loss or gain in last 6 months       Depression Screening:   PHQ-2 Score: 0      Fall Risk Screening: In the past year, patient has experienced: no history of falling in past year      Home Safety:  Patient has trouble with stairs inside or outside of their home  Patient has working smoke alarms and has no working carbon monoxide detector  Home safety hazards include: none  Nutrition:   Current diet is Regular, Low Cholesterol, Low Saturated Fat and No Added Salt  Medications:   Patient is currently taking over-the-counter supplements  OTC medications include: see medication list  Patient is able to manage medications  Activities of Daily Living (ADLs)/Instrumental Activities of Daily Living (IADLs):   Walk and transfer into and out of bed and chair?: Yes  Dress and groom yourself?: Yes    Bathe or shower yourself?: Yes    Feed yourself? Yes  Do your laundry/housekeeping?: Yes  Manage your money, pay your bills and track your expenses?: Yes  Make your own meals?: Yes    Do your own shopping?: Yes    Previous Hospitalizations:   Any hospitalizations or ED visits within the last 12 months?: No      Advance Care Planning:   Living will: Yes    Durable POA for healthcare:  Yes    Advanced directive: Yes      Cognitive Screening:   Provider or family/friend/caregiver concerned regarding cognition?: No    PREVENTIVE SCREENINGS      Cardiovascular Screening:    General: Screening Not Indicated and History Lipid Disorder      Diabetes Screening:     General: Screening Current      Colorectal Cancer Screening:     General: Screening Not Indicated      Prostate Cancer Screening:    General: Screening Not Indicated      Osteoporosis Screening:    General: Screening Not Indicated      Abdominal Aortic Aneurysm (AAA) Screening:    Risk factors include: tobacco use        Lung Cancer Screening:     General: Screening Not Indicated      Hepatitis C Screening:    General: Screening Current      Arabella Hagan MD

## 2021-01-12 NOTE — PHYSICAL THERAPY NOTE
PT tx     11/15/18 1210   Pain Assessment   Pain Assessment No/denies pain   Pain Score No Pain   General   Chart Reviewed Yes   Additional Pertinent History colonoscopy this am; advanced to regular diet, to rec 1 additional unit prbcs   Cognition   Overall Cognitive Status [facility]   Arousal/Participation Alert   Attention Within functional limits   Orientation Level Oriented X4   Following Commands Follows all commands and directions without difficulty   Subjective   Subjective Agrees to mobilize   Bed Mobility   Rolling R 7  Independent   Supine to Sit 7  Independent   Transfers   Sit to Stand 7  Independent   Stand to Sit 7  Independent   Stand pivot 5  Supervision   Ambulation/Elevation   Gait pattern Narrow BENNY   Gait Assistance 5  Supervision   Assistive Device None   Distance 25'   Balance   Static Sitting Normal   Dynamic Sitting Good   Static Standing Good   Dynamic Standing Good   Ambulatory Good   Endurance Deficit   Endurance Deficit No   Activity Tolerance   Activity Tolerance Patient tolerated treatment well   Nurse Made Aware yes   Assessment   Prognosis Good   Problem List Decreased mobility   Assessment Pt progressing with mobility  Able to amb shor tdistance in room  Instr to ring for staff prior to going to [facility]   Will follow for 1-2 additional sessions to assess gait further in hallway and steps   Barriers to Discharge (medical status)   Goals   Patient Goals get better  go home   Plan   Treatment/Interventions Functional transfer training;Gait training   Progress Progressing toward goals   PT Frequency 2-3x/wk   Recommendation   Recommendation Home independently   PT - OK to Discharge No   Amrita Clarke PT Subjective:      Patient ID: Esau Neville is a 16 y.o. female. HPI    Review of Systems   Constitutional: Negative for activity change, appetite change, chills, diaphoresis, fatigue, fever and unexpected weight change. HENT: Negative for congestion, dental problem, drooling, ear discharge, ear pain, facial swelling, hearing loss, nosebleeds, rhinorrhea, sore throat, tinnitus, trouble swallowing and voice change. Eyes: Negative for photophobia, pain, discharge, redness, itching and visual disturbance. Respiratory: Negative for cough, shortness of breath, wheezing and stridor. Cardiovascular: Negative for chest pain, palpitations and leg swelling. Gastrointestinal: Negative for abdominal distention, abdominal pain, blood in stool, constipation, diarrhea, nausea and vomiting. Endocrine: Negative for cold intolerance, heat intolerance, polydipsia, polyphagia and polyuria. Genitourinary: Positive for flank pain. Negative for decreased urine volume, difficulty urinating, dysuria, enuresis, frequency, hematuria and urgency. Musculoskeletal: Negative for arthralgias, back pain, gait problem, joint swelling, myalgias, neck pain and neck stiffness. Skin: Negative for color change, pallor, rash and wound. Allergic/Immunologic: Negative for environmental allergies, food allergies and immunocompromised state. Neurological: Positive for headaches. Negative for dizziness, tremors, seizures, syncope, facial asymmetry, speech difficulty, weakness, light-headedness and numbness. Hematological: Negative for adenopathy. Does not bruise/bleed easily. Psychiatric/Behavioral: Negative for agitation, behavioral problems, confusion, decreased concentration, dysphoric mood, hallucinations and sleep disturbance. The patient is not nervous/anxious and is not hyperactive. Objective:   Physical Exam  Vitals signs and nursing note reviewed. Exam conducted with a chaperone present.    Constitutional: General: She is not in acute distress. Appearance: Normal appearance. She is well-developed. She is not diaphoretic. HENT:      Head: Normocephalic and atraumatic. Nose: Nose normal. No congestion or rhinorrhea. Mouth/Throat:      Pharynx: No oropharyngeal exudate. Eyes:      General: No scleral icterus. Right eye: No discharge. Left eye: No discharge. Pupils: Pupils are equal, round, and reactive to light. Neck:      Musculoskeletal: Normal range of motion. No neck rigidity. Pulmonary:      Effort: Pulmonary effort is normal. No respiratory distress. Abdominal:      General: There is no distension. Palpations: Abdomen is soft. Musculoskeletal: Normal range of motion. Skin:     General: Skin is warm. Coloration: Skin is not jaundiced or pale. Findings: No bruising, erythema, lesion or rash. Neurological:      General: No focal deficit present. Mental Status: She is alert and oriented to person, place, and time. Psychiatric:         Mood and Affect: Mood normal.         Behavior: Behavior normal.         Thought Content:  Thought content normal.         Judgment: Judgment normal.         Assessment:      Nephrolithiasis   upj obstruction  rec apn  Headache       Plan:      educ  Fluids  [employer]ol[employer]  F/u 2-3 mos    Additional detailed information from this visit is to follow in a dictated consult letter Sundeep Wiggins is a 16 y.o. female being evaluated by a Virtual Visit (video visit) encounter to address concerns as mentioned above. A caregiver was present when appropriate. Due to this being a TeleHealth encounter (During XVXUZ-50 public health emergency), evaluation of the following organ systems was limited: Vitals/Constitutional/EENT/Resp/CV/GI//MS/Neuro/Skin/Heme-Lymph-Imm. Pursuant to the emergency declaration under the [address] [place] [address] and the [employer] and [employer] Act, this Virtual Visit was conducted with patient's (and/or legal guardian's) consent, to reduce the patient's risk of exposure to COVID-19 and provide necessary medical care. The patient (and/or legal guardian) has also been advised to contact this office for worsening conditions or problems, and seek emergency medical treatment and/or call 911 if deemed necessary. Patient identification was verified at the start of the visit: Yes    Total time spent for this encounter: 40 mins    Services were provided through a video synchronous discussion virtually to substitute for in-person clinic visit. Patient and provider were located at their individual homes. --Rubia Yanez MD on 1/12/2021 at 11:37 AM    An electronic signature was used to authenticate this note.           Rubia Yanez MD

## 2021-02-09 DIAGNOSIS — Z23 ENCOUNTER FOR IMMUNIZATION: ICD-10-CM

## 2021-02-13 ENCOUNTER — IMMUNIZATIONS (OUTPATIENT)
Dept: FAMILY MEDICINE CLINIC | Facility: HOSPITAL | Age: 86
End: 2021-02-13

## 2021-02-13 DIAGNOSIS — Z23 ENCOUNTER FOR IMMUNIZATION: Primary | ICD-10-CM

## 2021-02-13 PROCEDURE — 91301 SARS-COV-2 / COVID-19 MRNA VACCINE (MODERNA) 100 MCG: CPT | Performed by: INTERNAL MEDICINE

## 2021-02-13 PROCEDURE — 0011A SARS-COV-2 / COVID-19 MRNA VACCINE (MODERNA) 100 MCG: CPT | Performed by: INTERNAL MEDICINE

## 2021-03-11 ENCOUNTER — IMMUNIZATIONS (OUTPATIENT)
Dept: FAMILY MEDICINE CLINIC | Facility: HOSPITAL | Age: 86
End: 2021-03-11

## 2021-03-11 DIAGNOSIS — Z23 ENCOUNTER FOR IMMUNIZATION: Primary | ICD-10-CM

## 2021-03-11 PROCEDURE — 0012A SARS-COV-2 / COVID-19 MRNA VACCINE (MODERNA) 100 MCG: CPT

## 2021-03-11 PROCEDURE — 91301 SARS-COV-2 / COVID-19 MRNA VACCINE (MODERNA) 100 MCG: CPT

## 2021-07-03 ENCOUNTER — APPOINTMENT (EMERGENCY)
Dept: RADIOLOGY | Facility: HOSPITAL | Age: 86
End: 2021-07-03
Payer: MEDICARE

## 2021-07-03 ENCOUNTER — HOSPITAL ENCOUNTER (EMERGENCY)
Facility: HOSPITAL | Age: 86
Discharge: HOME | End: 2021-07-03
Attending: EMERGENCY MEDICINE
Payer: MEDICARE

## 2021-07-03 VITALS
TEMPERATURE: 97.3 F | DIASTOLIC BLOOD PRESSURE: 74 MMHG | WEIGHT: 182 LBS | RESPIRATION RATE: 20 BRPM | SYSTOLIC BLOOD PRESSURE: 176 MMHG | OXYGEN SATURATION: 99 % | BODY MASS INDEX: 27.07 KG/M2 | HEART RATE: 61 BPM

## 2021-07-03 DIAGNOSIS — R79.1 SUPRATHERAPEUTIC INR: ICD-10-CM

## 2021-07-03 DIAGNOSIS — S80.11XA HEMATOMA OF RIGHT LOWER EXTREMITY, INITIAL ENCOUNTER: Primary | ICD-10-CM

## 2021-07-03 DIAGNOSIS — S80.11XA CONTUSION OF RIGHT CALF, INITIAL ENCOUNTER: ICD-10-CM

## 2021-07-03 LAB
HCT VFR BLD AUTO: 27.3 % (ref 36.5–49.3)
HGB BLD-MCNC: 8.5 G/DL (ref 12–17)
INR PPP: 4.03 (ref 0.84–1.19)
PROTHROMBIN TIME: 38.9 SECONDS (ref 11.6–14.5)

## 2021-07-03 PROCEDURE — 99285 EMERGENCY DEPT VISIT HI MDM: CPT | Performed by: PHYSICIAN ASSISTANT

## 2021-07-03 PROCEDURE — 85014 HEMATOCRIT: CPT | Performed by: PHYSICIAN ASSISTANT

## 2021-07-03 PROCEDURE — 99283 EMERGENCY DEPT VISIT LOW MDM: CPT

## 2021-07-03 PROCEDURE — 36415 COLL VENOUS BLD VENIPUNCTURE: CPT | Performed by: PHYSICIAN ASSISTANT

## 2021-07-03 PROCEDURE — 85610 PROTHROMBIN TIME: CPT | Performed by: PHYSICIAN ASSISTANT

## 2021-07-03 PROCEDURE — 73590 X-RAY EXAM OF LOWER LEG: CPT

## 2021-07-03 PROCEDURE — 85018 HEMOGLOBIN: CPT | Performed by: PHYSICIAN ASSISTANT

## 2021-07-03 NOTE — ED PROVIDER NOTES
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 Taking?   atorvastatin (LIPITOR) 10 mg tablet   Yes No   Sig: Take by mouth   ferrous sulfate (IRON SUPPLEMENT) 325 (65 Fe) mg tablet   Yes No   Sig: Take by mouth   furosemide (LASIX) 20 mg tablet   Yes No   Sig: Take by mouth   latanoprost (XALATAN) 0 005 % ophthalmic solution   Yes No   Sig: Administer 1 drop to both eyes daily at bedtime     lisinopril (ZESTRIL) 5 mg tablet   Yes No   Sig: Take by mouth   metoprolol tartrate (LOPRESSOR) 25 mg tablet   Yes No   Sig: Take by mouth   potassium chloride (K-DUR,KLOR-CON) 20 mEq tablet   Yes No   Sig: Take by mouth   warfarin (COUMADIN) 5 mg tablet   Yes No   Sig: Take by mouth      Facility-Administered Medications: None       History reviewed  No pertinent past medical history  Past Surgical History:   Procedure Laterality Date    COLONOSCOPY N/A 11/15/2018    Procedure: COLONOSCOPY;  Surgeon: Johnathon Meyers MD;  Location:  MAIN OR;  Service: Gastroenterology    CORONARY ARTERY BYPASS GRAFT      ESOPHAGOGASTRODUODENOSCOPY N/A 11/14/2018    Procedure: ESOPHAGOGASTRODUODENOSCOPY (EGD);   Surgeon: Claudio Umana MD;  Location:  MAIN OR;  Service: Gastroenterology    MITRAL VALVE REPLACEMENT         Family History   Problem Relation Age of Onset    Alzheimer's disease Mother      I have reviewed and agree with the history as documented  E-Cigarette/Vaping    E-Cigarette Use Never User      E-Cigarette/Vaping Substances    Nicotine No     Flavoring No      Social History     Tobacco Use    Smoking status: Former Smoker     Quit date: 1985     Years since quittin 6    Smokeless tobacco: Never Used   Vaping Use    Vaping Use: Never used   Substance Use Topics    Alcohol use: Yes     Comment: occasional    Drug use: No       Review of Systems   Constitutional: Negative for chills, diaphoresis and fever  Eyes: Negative for visual disturbance  Respiratory: Negative for cough and shortness of breath  Cardiovascular: Positive for leg swelling (RLE)  Negative for chest pain and palpitations  Gastrointestinal: Negative for abdominal pain, diarrhea, nausea and vomiting  Genitourinary: Negative for dysuria, flank pain and frequency  Musculoskeletal: Negative for arthralgias, gait problem and myalgias  Skin: Positive for color change (no cyanosis)  Negative for rash and wound  Allergic/Immunologic: Negative for immunocompromised state  Neurological: Negative for dizziness and light-headedness  Hematological: Does not bruise/bleed easily  Psychiatric/Behavioral: Negative for confusion  The patient is not nervous/anxious  Physical Exam  Physical Exam  Vitals and nursing note reviewed  Constitutional:       Appearance: He is well-developed  HENT:      Head: Normocephalic and atraumatic  Mouth/Throat:      Mouth: Mucous membranes are moist    Eyes:      Conjunctiva/sclera: Conjunctivae normal    Cardiovascular:      Rate and Rhythm: Normal rate and regular rhythm  Heart sounds: No murmur heard  Pulmonary:      Effort: Pulmonary effort is normal  No respiratory distress  Breath sounds: Normal breath sounds  Abdominal:      Palpations: Abdomen is soft  Tenderness:  There is no abdominal tenderness  Musculoskeletal:      Cervical back: Neck supple  Right lower leg: Tenderness (calf) present  No deformity or lacerations  3+ Pitting Edema present  Left lower leg: No edema  Comments: Large hematoma R calf, non pulsatile  Sensation intact R foot w/o deficit   Skin:     General: Skin is warm and dry  Capillary Refill: Capillary refill takes less than 2 seconds  Neurological:      General: No focal deficit present  Mental Status: He is alert and oriented to person, place, and time     Psychiatric:         Mood and Affect: Mood normal          Behavior: Behavior normal          Vital Signs  ED Triage Vitals [07/03/21 0906]   Temperature Pulse Respirations Blood Pressure SpO2   (!) 97 3 °F (36 3 °C) 61 20 (!) 176/74 99 %      Temp Source Heart Rate Source Patient Position - Orthostatic VS BP Location FiO2 (%)   Tympanic Monitor Sitting Right arm --      Pain Score       5           Vitals:    07/03/21 0906   BP: (!) 176/74   Pulse: 61   Patient Position - Orthostatic VS: Sitting         Visual Acuity      ED Medications  Medications - No data to display    Diagnostic Studies  Results Reviewed     Procedure Component Value Units Date/Time    Protime-INR [[id number]]  (Abnormal) Collected: 07/03/21 0947    Lab Status: Final result Specimen: Blood from Arm, Right Updated: 07/03/21 1010     Protime 38 9 seconds      INR 4 03    Hemoglobin and hematocrit, blood [[id number]]  (Abnormal) Collected: 07/03/21 0947    Lab Status: Final result Specimen: Blood from Arm, Right Updated: 07/03/21 0951     Hemoglobin 8 5 g/dL      Hematocrit 27 3 %                  XR tibia fibula 2 views RIGHT   ED Interpretation by Randy Quiñones PA-C (07/03 9030)   No acute bony abnormality, soft tissue hematoma R calf                 Procedures  Procedures         ED Course                                           MDM  Number of Diagnoses or Management Options  Contusion of right calf, initial encounter: new and requires workup  Hematoma of right lower extremity, initial encounter: new and requires workup  Supratherapeutic INR  Diagnosis management comments:   Significant decrease in hemoglobin compared to baseline however last reading was from 1 year ago, do not suspect patient has significant blood loss into the calf  On bedside ultrasound there is a 3 x 5 cm nonpulsatile hematoma to the proximal calf with strong dorsalis pedis and posterior tibialis pulses  Patient has normal sensation capillary refill, do not suspect arterial injury  Given elevated INR, the patient is advised to hold Coumadin dosing tonight and follow up with INR clinic on Monday for re-evaluation  Repeat hemoglobin is ordered, if hemoglobin continues to drop on re-evaluation patient will need emergency department evaluation as there is likely a alternative source to blood loss       Amount and/or Complexity of Data Reviewed  Clinical lab tests: ordered and reviewed  Tests in the medicine section of CPT®: ordered and reviewed  Review and summarize past medical records: yes        Disposition  Final diagnoses:   Hematoma of right lower extremity, initial encounter   Contusion of right calf, initial encounter   Supratherapeutic INR     Time reflects when diagnosis was documented in both MDM as applicable and the Disposition within this note     Time User Action Codes Description Comment    7/3/2021 10:29 AM Trever Close Add [S80 11XA] Hematoma of right lower extremity, initial encounter     7/3/2021 10:30 AM Trever Close Add [S80 11XA] Contusion of right calf, initial encounter     7/3/2021 10:45 AM Trever Close Add [R79 1] Supratherapeutic INR       ED Disposition     ED Disposition Condition Date/Time Comment    Discharge Stable Sat Jul 3, 2021 10:29 AM Lis Due discharge to home/self care              Follow-up Information     Follow up With Specialties Details Why Contact Info    Anastacia Lanes, MD Family Medicine In 2 days [address]  [place] [address]  [phone]            Discharge Medication List as of 7/3/2021 10:31 AM      CONTINUE these medications which have NOT CHANGED    Details   atorvastatin (LIPITOR) 10 mg tablet Take by mouth, Starting Tue 7/27/2010, Historical Med      ferrous sulfate (IRON SUPPLEMENT) 325 (65 Fe) mg tablet Take by mouth, Historical Med      furosemide (LASIX) 20 mg tablet Take by mouth, Starting Tue 7/27/2010, Historical Med      latanoprost (XALATAN) 0 005 % ophthalmic solution Administer 1 drop to both eyes daily at bedtime  , Historical Med      lisinopril (ZESTRIL) 5 mg tablet Take by mouth, Starting u 5/19/2011, Historical Med      metoprolol tartrate (LOPRESSOR) 25 mg tablet Take by mouth, Starting Tue 1/25/2011, Historical Med      potassium chloride (K-DUR,KLOR-CON) 20 mEq tablet Take by mouth, Starting Tue 7/12/2011, Historical Med      warfarin (COUMADIN) 5 mg tablet Take by mouth, Starting Mon 10/10/2011, Historical Med           Outpatient Discharge Orders   Hemoglobin   Standing Status: Future Standing Exp   Date: 07/03/22       PDMP Review     None          ED Provider  Electronically Signed by           Gaby Friedman PA-C  07/03/21 5491

## 2021-07-06 ENCOUNTER — TELEPHONE (OUTPATIENT)
Dept: FAMILY MEDICINE CLINIC | Facility: CLINIC | Age: 86
End: 2021-07-06

## 2021-07-06 DIAGNOSIS — I25.10 ARTERIOSCLEROTIC CARDIOVASCULAR DISEASE: Primary | ICD-10-CM

## 2021-07-06 NOTE — TELEPHONE ENCOUNTER
Hospital told pt to get the protime-inr lab Re done           Please send to hospital       Cb#  [id number]

## 2021-07-07 ENCOUNTER — APPOINTMENT (OUTPATIENT)
Dept: LAB | Facility: HOSPITAL | Age: 86
End: 2021-07-07
Payer: MEDICARE

## 2021-07-07 ENCOUNTER — OFFICE VISIT (OUTPATIENT)
Dept: FAMILY MEDICINE CLINIC | Facility: CLINIC | Age: 86
End: 2021-07-07
Payer: MEDICARE

## 2021-07-07 ENCOUNTER — TELEPHONE (OUTPATIENT)
Dept: FAMILY MEDICINE CLINIC | Facility: CLINIC | Age: 86
End: 2021-07-07

## 2021-07-07 VITALS
DIASTOLIC BLOOD PRESSURE: 60 MMHG | WEIGHT: 184.6 LBS | OXYGEN SATURATION: 97 % | SYSTOLIC BLOOD PRESSURE: 120 MMHG | HEART RATE: 74 BPM | HEIGHT: 69 IN | BODY MASS INDEX: 27.34 KG/M2 | RESPIRATION RATE: 16 BRPM

## 2021-07-07 DIAGNOSIS — S80.11XD LEG HEMATOMA, RIGHT, SUBSEQUENT ENCOUNTER: Primary | ICD-10-CM

## 2021-07-07 DIAGNOSIS — D68.9 COAGULOPATHY (HCC): ICD-10-CM

## 2021-07-07 DIAGNOSIS — L03.115 CELLULITIS OF RIGHT LOWER EXTREMITY: ICD-10-CM

## 2021-07-07 DIAGNOSIS — Z00.00 MEDICARE ANNUAL WELLNESS VISIT, SUBSEQUENT: ICD-10-CM

## 2021-07-07 DIAGNOSIS — I10 ESSENTIAL HYPERTENSION: Primary | ICD-10-CM

## 2021-07-07 DIAGNOSIS — S80.11XA HEMATOMA OF RIGHT LOWER EXTREMITY, INITIAL ENCOUNTER: ICD-10-CM

## 2021-07-07 DIAGNOSIS — E78.2 MIXED HYPERLIPIDEMIA: ICD-10-CM

## 2021-07-07 LAB
HGB BLD-MCNC: 8.9 G/DL (ref 12–17)
INR PPP: 2.6 (ref 0.84–1.19)
PROTHROMBIN TIME: 27.7 SECONDS (ref 11.6–14.5)

## 2021-07-07 PROCEDURE — 99214 OFFICE O/P EST MOD 30 MIN: CPT | Performed by: NURSE PRACTITIONER

## 2021-07-07 PROCEDURE — 85018 HEMOGLOBIN: CPT

## 2021-07-07 PROCEDURE — 85610 PROTHROMBIN TIME: CPT

## 2021-07-07 PROCEDURE — 36415 COLL VENOUS BLD VENIPUNCTURE: CPT

## 2021-07-07 RX ORDER — CEPHALEXIN 500 MG/1
500 CAPSULE ORAL EVERY 8 HOURS SCHEDULED
Qty: 21 CAPSULE | Refills: 0 | Status: SHIPPED | OUTPATIENT
Start: 2021-07-07 | End: 2021-07-14

## 2021-07-07 RX ORDER — AMOXICILLIN 500 MG/1
CAPSULE ORAL
COMMUNITY
Start: 2021-06-30

## 2021-07-07 NOTE — PROGRESS NOTES
[facility]        NAME: Isabell Acevedo is a 80 y o  male  : 1935    MRN: [id number]  DATE: 2021  TIME: 2:39 PM    Assessment and Plan   Leg hematoma, right, subsequent encounter [S80 11XD]  1  Leg hematoma, right, subsequent encounter     2  Coagulopathy ([address] )  Protime-INR   3  Cellulitis of right lower extremity  cephalexin (KEFLEX) 500 mg capsule         Patient Instructions     Patient Instructions     Elevate and compression to right leg on to reduce swelling  Repeat Hemoglobin today was 8 9 and repeat INR 2 60- results were faxed to your Cardiology office Continue F/up with cardiology for coumadin management  Take Keflex as ordered for cellulitis of right leg  Call if no improvement or worsening symptoms  Call with any problems or concerns  Cellulitis   AMBULATORY CARE:   Cellulitis  is a skin infection caused by bacteria  Cellulitis usually appears on the legs and feet, arms and hands, or face  Common signs and symptoms include the following:   · A red, warm, swollen area on your skin    · Pain when the area is touched    · Bumps or blisters (abscess) that may drain pus    · Bumpy, raised skin that feels like an orange peel    Call 911 if:   · You have sudden trouble breathing or chest pain  Seek care immediately if:   · Your wound gets larger and more painful  · You feel a crackling under your skin when you touch it  · You have purple dots or bumps on your skin, or you see bleeding under your skin  · You have new swelling and pain in your legs  · The red, warm, swollen area gets larger  · You see red streaks coming from the infected area  Contact your healthcare provider if:   · You have a fever  · Your fever or pain does not go away or gets worse  · The area does not get smaller after 2 days of antibiotics  · Your skin is flaking or peeling off      · You have questions or concerns about your condition or care     Treatment for cellulitis  may decrease symptoms, stop the infection from spreading, and cure the infection  Treatment depends on how severe your cellulitis is  Cellulitis may go away on its own  You may  instead need antibiotics to help treat the bacterial infection and medicines for pain  Your healthcare provider may draw a [place] around the edges of your cellulitis  If your cellulitis spreads, your healthcare provider will see it outside of the [place]  Manage your symptoms:   · Elevate the area above the level of your heart  as often as you can  This will help decrease swelling and pain  Prop the area on pillows or blankets to keep it elevated comfortably  · Clean the area daily until the wound scabs over  Gently wash the area with soap and water  Pat dry  Use dressings as directed  · Place cool or warm, wet cloths on the area as directed  Use clean cloths and clean water  Leave it on the area until the cloth is room temperature  Pat the area dry with a clean, dry cloth  The cloths may help decrease pain  Prevent cellulitis:   · Do not scratch bug bites or areas of injury  You increase your risk for cellulitis by scratching these areas  · Do not share personal items, such as towels, clothing, and razors  · Clean exercise equipment  with germ-killing  before and after you use it  · Wash your hands often  Use soap and water  Wash your hands after you use the bathroom, change a child's diapers, or sneeze  Wash your hands before you prepare or eat food  Use lotion to prevent dry, cracked skin  · Wear pressure stockings as directed  You may be told to wear the stockings if you have peripheral edema  The stockings improve blood flow and decrease swelling  · Treat athlete's foot  This can help prevent the spread of a bacterial skin infection  Follow up with your healthcare provider within 3 days, or as directed:   Your healthcare provider will check if your cellulitis is getting better  You may need different medicine  Write down your questions so you remember to ask them during your visits  © Copyright [address] Information is for End User's use only and may not be sold, redistributed or otherwise used for commercial purposes  All illustrations and images included in CareNotes® are the copyrighted property of [employer] M [employer]  or A[employer] Goodman   The above information is an  only  It is not intended as medical advice for individual conditions or treatments  Talk to your doctor, nurse or pharmacist before following any medical regimen to see if it is safe and effective for you  Chief Complaint     Chief Complaint   Patient presents with    Follow-up     Pt is here for ER f/u leg injury         History of Present Illness       F/up right leg injury  Seen in ER 7/3/21  for right leg injury/hematoma  Patient states hematoma has improved some and swelling has decreased since being seen in the ER  He has been elevating leg and has ace wrap on for compression  Swelling has gone down but leg there is redness and warmth  Denies fever, no shortness of breath, no chest pain  Hemoglobin in the ER was 8 5 and his INR was 4 03  He was instructed to hold coumadin for a day and to have a repeat hemoglobin  Review of Systems   Review of Systems   Constitutional: Negative for activity change, chills and fever  Respiratory: Negative for cough, chest tightness, shortness of breath and wheezing  Cardiovascular: Positive for leg swelling  Negative for chest pain and palpitations  Gastrointestinal: Negative for nausea  Musculoskeletal: Negative for arthralgias and gait problem  Skin: Positive for color change  Negative for pallor and rash  Neurological: Negative for dizziness, light-headedness and numbness  Hematological: Bruises/bleeds easily  Psychiatric/Behavioral: Negative for confusion and decreased concentration  Current Medications       Current Outpatient Medications:     atorvastatin (LIPITOR) 10 mg tablet, Take by mouth, Disp: , Rfl:     ferrous sulfate (IRON SUPPLEMENT) 325 (65 Fe) mg tablet, Take by mouth, Disp: , Rfl:     furosemide (LASIX) 20 mg tablet, Take by mouth, Disp: , Rfl:     lisinopril (ZESTRIL) 5 mg tablet, Take by mouth, Disp: , Rfl:     metoprolol tartrate (LOPRESSOR) 25 mg tablet, Take by mouth, Disp: , Rfl:     potassium chloride (K-DUR,KLOR-CON) 20 mEq tablet, Take by mouth, Disp: , Rfl:     amoxicillin (AMOXIL) 500 mg capsule, take 4 capsules by mouth 1 hour prior to procedure (Patient not taking: Reported on 7/7/2021), Disp: , Rfl:     cephalexin (KEFLEX) 500 mg capsule, Take 1 capsule (500 mg total) by mouth every 8 (eight) hours for 7 days, Disp: 21 capsule, Rfl: 0    latanoprost (XALATAN) 0 005 % ophthalmic solution, Administer 1 drop to both eyes daily at bedtime   (Patient not taking: Reported on 7/7/2021), Disp: , Rfl:     warfarin (COUMADIN) 5 mg tablet, Take by mouth, Disp: , Rfl:     Current Allergies     Allergies as of 07/07/2021 - Reviewed 07/07/2021   Allergen Reaction Noted    Adhesive [medical tape] Rash 01/15/2019            The following portions of the patient's history were reviewed and updated as appropriate: allergies, current medications, past family history, past medical history, past social history, past surgical history and problem list      History reviewed  No pertinent past medical history  Past Surgical History:   Procedure Laterality Date    COLONOSCOPY N/A 11/15/2018    Procedure: COLONOSCOPY;  Surgeon: Laith Leger MD;  Location:  MAIN OR;  Service: Gastroenterology    CORONARY ARTERY BYPASS GRAFT      ESOPHAGOGASTRODUODENOSCOPY N/A 11/14/2018    Procedure: ESOPHAGOGASTRODUODENOSCOPY (EGD);   Surgeon: Jada Colin MD;  Location:  MAIN OR;  Service: Gastroenterology    MITRAL VALVE REPLACEMENT         Family History   Problem Relation Age of Onset    Alzheimer's disease Mother          Medications have been verified  Objective   /60   Pulse 74   Resp 16   Ht 5' 8 75" (1 746 m)   Wt 83 7 kg (184 lb 9 6 oz)   SpO2 97%   BMI 27 46 kg/m²        Physical Exam     Physical Exam  Constitutional:       General: He is not in acute distress  Appearance: Normal appearance  He is normal weight  He is not ill-appearing  HENT:      Head: Normocephalic  Cardiovascular:      Rate and Rhythm: Normal rate  Heart sounds: Normal heart sounds  No murmur heard  No gallop  Pulmonary:      Effort: Pulmonary effort is normal  No respiratory distress  Breath sounds: Normal breath sounds  Musculoskeletal:         General: Swelling and tenderness present  Cervical back: Normal range of motion  Right lower leg: Edema present  Comments: There is moderate edema to right lower extremity, hematoma noted to right calf, there is no cyanosis  Erythema and warmth present to calf  Bruising present to right foot  Skin:     General: Skin is warm and dry  Coloration: Skin is not pale  Findings: Erythema present  No rash  Comments: Erythema to right lower extremity, warm to touch  Neurological:      Mental Status: He is alert and oriented to person, place, and time  Psychiatric:         Mood and Affect: Mood normal          Behavior: Behavior normal          Thought Content:  Thought content normal          Judgment: Judgment normal

## 2021-07-07 NOTE — TELEPHONE ENCOUNTER
----- Message from [address] [place] [address],[address] sent at 7/7/2021  2:27 PM EDT -----  Call patient with results  Please Fax results of Pt/inr and hemoglobin to patient's cardiologist, they follow him for his coumadin   Fax # [phone]

## 2021-07-07 NOTE — PATIENT INSTRUCTIONS
Elevate and compression to right leg on to reduce swelling  Repeat Hemoglobin today was 8 9 and repeat INR 2 60- results were faxed to your Cardiology office Continue F/up with cardiology for coumadin management  Take Keflex as ordered for cellulitis of right leg  Call if no improvement or worsening symptoms  Call with any problems or concerns  Cellulitis   AMBULATORY CARE:   Cellulitis  is a skin infection caused by bacteria  Cellulitis usually appears on the legs and feet, arms and hands, or face  Common signs and symptoms include the following:   · A red, warm, swollen area on your skin    · Pain when the area is touched    · Bumps or blisters (abscess) that may drain pus    · Bumpy, raised skin that feels like an orange peel    Call 911 if:   · You have sudden trouble breathing or chest pain  Seek care immediately if:   · Your wound gets larger and more painful  · You feel a crackling under your skin when you touch it  · You have purple dots or bumps on your skin, or you see bleeding under your skin  · You have new swelling and pain in your legs  · The red, warm, swollen area gets larger  · You see red streaks coming from the infected area  Contact your healthcare provider if:   · You have a fever  · Your fever or pain does not go away or gets worse  · The area does not get smaller after 2 days of antibiotics  · Your skin is flaking or peeling off  · You have questions or concerns about your condition or care  Treatment for cellulitis  may decrease symptoms, stop the infection from spreading, and cure the infection  Treatment depends on how severe your cellulitis is  Cellulitis may go away on its own  You may  instead need antibiotics to help treat the bacterial infection and medicines for pain  Your healthcare provider may draw a [place] around the edges of your cellulitis   If your cellulitis spreads, your healthcare provider will see it outside of the [place]  Manage your symptoms:   · Elevate the area above the level of your heart  as often as you can  This will help decrease swelling and pain  Prop the area on pillows or blankets to keep it elevated comfortably  · Clean the area daily until the wound scabs over  Gently wash the area with soap and water  Pat dry  Use dressings as directed  · Place cool or warm, wet cloths on the area as directed  Use clean cloths and clean water  Leave it on the area until the cloth is room temperature  Pat the area dry with a clean, dry cloth  The cloths may help decrease pain  Prevent cellulitis:   · Do not scratch bug bites or areas of injury  You increase your risk for cellulitis by scratching these areas  · Do not share personal items, such as towels, clothing, and razors  · Clean exercise equipment  with germ-killing  before and after you use it  · Wash your hands often  Use soap and water  Wash your hands after you use the bathroom, change a child's diapers, or sneeze  Wash your hands before you prepare or eat food  Use lotion to prevent dry, cracked skin  · Wear pressure stockings as directed  You may be told to wear the stockings if you have peripheral edema  The stockings improve blood flow and decrease swelling  · Treat athlete's foot  This can help prevent the spread of a bacterial skin infection  Follow up with your healthcare provider within 3 days, or as directed: Your healthcare provider will check if your cellulitis is getting better  You may need different medicine  Write down your questions so you remember to ask them during your visits  © Copyright [address] Information is for End User's use only and may not be sold, redistributed or otherwise used for commercial purposes  All illustrations and images included in CareNotes® are the copyrighted property of [employer]  or [address] [place], [place] [address]jay Goodman   The above information is an  only   It is not intended as medical advice for individual conditions or treatments  Talk to your doctor, nurse or pharmacist before following any medical regimen to see if it is safe and effective for you

## 2021-09-29 ENCOUNTER — APPOINTMENT (OUTPATIENT)
Dept: LAB | Facility: HOSPITAL | Age: 86
End: 2021-09-29
Payer: MEDICARE

## 2021-09-29 DIAGNOSIS — I10 ESSENTIAL HYPERTENSION: ICD-10-CM

## 2021-09-29 DIAGNOSIS — E78.2 MIXED HYPERLIPIDEMIA: ICD-10-CM

## 2021-09-29 DIAGNOSIS — Z00.00 MEDICARE ANNUAL WELLNESS VISIT, SUBSEQUENT: ICD-10-CM

## 2021-09-29 LAB
ALBUMIN SERPL BCP-MCNC: 3.7 G/DL (ref 3.5–5)
ALP SERPL-CCNC: 62 U/L (ref 46–116)
ALT SERPL W P-5'-P-CCNC: 24 U/L (ref 12–78)
ANION GAP SERPL CALCULATED.3IONS-SCNC: 6 MMOL/L (ref 4–13)
AST SERPL W P-5'-P-CCNC: 28 U/L (ref 5–45)
BILIRUB SERPL-MCNC: 0.5 MG/DL (ref 0.2–1)
BUN SERPL-MCNC: 22 MG/DL (ref 5–25)
CALCIUM SERPL-MCNC: 8.5 MG/DL (ref 8.3–10.1)
CHLORIDE SERPL-SCNC: 105 MMOL/L (ref 100–108)
CHOLEST SERPL-MCNC: 115 MG/DL (ref 50–200)
CO2 SERPL-SCNC: 30 MMOL/L (ref 21–32)
CREAT SERPL-MCNC: 1.03 MG/DL (ref 0.6–1.3)
GFR SERPL CREATININE-BSD FRML MDRD: 65 ML/MIN/1.73SQ M
GLUCOSE P FAST SERPL-MCNC: 94 MG/DL (ref 65–99)
HDLC SERPL-MCNC: 56 MG/DL
LDLC SERPL CALC-MCNC: 51 MG/DL (ref 0–100)
POTASSIUM SERPL-SCNC: 4.3 MMOL/L (ref 3.5–5.3)
PROT SERPL-MCNC: 7.1 G/DL (ref 6.4–8.2)
SODIUM SERPL-SCNC: 141 MMOL/L (ref 136–145)
TRIGL SERPL-MCNC: 39 MG/DL

## 2021-09-29 PROCEDURE — 36415 COLL VENOUS BLD VENIPUNCTURE: CPT

## 2021-09-29 PROCEDURE — 80053 COMPREHEN METABOLIC PANEL: CPT

## 2021-09-29 PROCEDURE — 80061 LIPID PANEL: CPT

## 2021-10-05 ENCOUNTER — HOSPITAL ENCOUNTER (OUTPATIENT)
Dept: RADIOLOGY | Facility: HOSPITAL | Age: 86
Discharge: HOME | End: 2021-10-05
Payer: MEDICARE

## 2021-10-05 ENCOUNTER — OFFICE VISIT (OUTPATIENT)
Dept: FAMILY MEDICINE CLINIC | Facility: CLINIC | Age: 86
End: 2021-10-05
Payer: MEDICARE

## 2021-10-05 ENCOUNTER — LAB (OUTPATIENT)
Dept: LAB | Facility: HOSPITAL | Age: 86
End: 2021-10-05
Payer: MEDICARE

## 2021-10-05 VITALS
OXYGEN SATURATION: 95 % | DIASTOLIC BLOOD PRESSURE: 68 MMHG | SYSTOLIC BLOOD PRESSURE: 124 MMHG | BODY MASS INDEX: 26.96 KG/M2 | HEART RATE: 68 BPM | RESPIRATION RATE: 16 BRPM | WEIGHT: 182 LBS | HEIGHT: 69 IN

## 2021-10-05 DIAGNOSIS — I48.0 PAROXYSMAL ATRIAL FIBRILLATION (HCC): ICD-10-CM

## 2021-10-05 DIAGNOSIS — R52 PAIN: ICD-10-CM

## 2021-10-05 DIAGNOSIS — K25.4 GASTROINTESTINAL HEMORRHAGE ASSOCIATED WITH GASTRIC ULCER: ICD-10-CM

## 2021-10-05 DIAGNOSIS — Z23 NEED FOR VACCINATION: ICD-10-CM

## 2021-10-05 DIAGNOSIS — D68.9 COAGULOPATHY (HCC): ICD-10-CM

## 2021-10-05 DIAGNOSIS — Z00.00 MEDICARE ANNUAL WELLNESS VISIT, SUBSEQUENT: Primary | ICD-10-CM

## 2021-10-05 DIAGNOSIS — I10 ESSENTIAL HYPERTENSION: ICD-10-CM

## 2021-10-05 DIAGNOSIS — E78.2 MIXED HYPERLIPIDEMIA: ICD-10-CM

## 2021-10-05 LAB
BASOPHILS # BLD AUTO: 0.04 THOUSANDS/ΜL (ref 0–0.1)
BASOPHILS NFR BLD AUTO: 1 % (ref 0–1)
EOSINOPHIL # BLD AUTO: 0.23 THOUSAND/ΜL (ref 0–0.61)
EOSINOPHIL NFR BLD AUTO: 5 % (ref 0–6)
ERYTHROCYTE [DISTWIDTH] IN BLOOD BY AUTOMATED COUNT: 15.7 % (ref 11.6–15.1)
HCT VFR BLD AUTO: 35.8 % (ref 36.5–49.3)
HGB BLD-MCNC: 10.7 G/DL (ref 12–17)
IMM GRANULOCYTES # BLD AUTO: 0.02 THOUSAND/UL (ref 0–0.2)
IMM GRANULOCYTES NFR BLD AUTO: 0 % (ref 0–2)
LYMPHOCYTES # BLD AUTO: 0.99 THOUSANDS/ΜL (ref 0.6–4.47)
LYMPHOCYTES NFR BLD AUTO: 20 % (ref 14–44)
MCH RBC QN AUTO: 26.8 PG (ref 26.8–34.3)
MCHC RBC AUTO-ENTMCNC: 29.9 G/DL (ref 31.4–37.4)
MCV RBC AUTO: 90 FL (ref 82–98)
MONOCYTES # BLD AUTO: 0.58 THOUSAND/ΜL (ref 0.17–1.22)
MONOCYTES NFR BLD AUTO: 12 % (ref 4–12)
NEUTROPHILS # BLD AUTO: 3.09 THOUSANDS/ΜL (ref 1.85–7.62)
NEUTS SEG NFR BLD AUTO: 62 % (ref 43–75)
NRBC BLD AUTO-RTO: 0 /100 WBCS
PLATELET # BLD AUTO: 190 THOUSANDS/UL (ref 149–390)
PMV BLD AUTO: 12.3 FL (ref 8.9–12.7)
RBC # BLD AUTO: 3.99 MILLION/UL (ref 3.88–5.62)
WBC # BLD AUTO: 4.95 THOUSAND/UL (ref 4.31–10.16)

## 2021-10-05 PROCEDURE — 73502 X-RAY EXAM HIP UNI 2-3 VIEWS: CPT

## 2021-10-05 PROCEDURE — 99214 OFFICE O/P EST MOD 30 MIN: CPT | Performed by: FAMILY MEDICINE

## 2021-10-05 PROCEDURE — 1123F ACP DISCUSS/DSCN MKR DOCD: CPT | Performed by: FAMILY MEDICINE

## 2021-10-05 PROCEDURE — 90662 IIV NO PRSV INCREASED AG IM: CPT

## 2021-10-05 PROCEDURE — G0439 PPPS, SUBSEQ VISIT: HCPCS | Performed by: FAMILY MEDICINE

## 2021-10-05 PROCEDURE — 36415 COLL VENOUS BLD VENIPUNCTURE: CPT

## 2021-10-05 PROCEDURE — G0008 ADMIN INFLUENZA VIRUS VAC: HCPCS

## 2021-10-05 PROCEDURE — 85025 COMPLETE CBC W/AUTO DIFF WBC: CPT

## 2021-10-06 ENCOUNTER — TELEPHONE (OUTPATIENT)
Dept: FAMILY MEDICINE CLINIC | Facility: CLINIC | Age: 86
End: 2021-10-06

## 2021-10-11 ENCOUNTER — TELEPHONE (OUTPATIENT)
Dept: FAMILY MEDICINE CLINIC | Facility: CLINIC | Age: 86
End: 2021-10-11

## 2021-12-16 ENCOUNTER — OFFICE VISIT (OUTPATIENT)
Dept: OBGYN CLINIC | Facility: CLINIC | Age: 86
End: 2021-12-16
Payer: MEDICARE

## 2021-12-16 ENCOUNTER — APPOINTMENT (OUTPATIENT)
Dept: RADIOLOGY | Facility: CLINIC | Age: 86
End: 2021-12-16
Payer: MEDICARE

## 2021-12-16 VITALS
DIASTOLIC BLOOD PRESSURE: 65 MMHG | BODY MASS INDEX: 27.61 KG/M2 | SYSTOLIC BLOOD PRESSURE: 110 MMHG | WEIGHT: 186.4 LBS | HEIGHT: 69 IN

## 2021-12-16 DIAGNOSIS — M70.62 TROCHANTERIC BURSITIS OF BOTH HIPS: ICD-10-CM

## 2021-12-16 DIAGNOSIS — M16.0 PRIMARY OSTEOARTHRITIS OF BOTH HIPS: Primary | ICD-10-CM

## 2021-12-16 DIAGNOSIS — M70.61 TROCHANTERIC BURSITIS OF BOTH HIPS: ICD-10-CM

## 2021-12-16 DIAGNOSIS — M25.551 PAIN IN RIGHT HIP: ICD-10-CM

## 2021-12-16 PROCEDURE — 73502 X-RAY EXAM HIP UNI 2-3 VIEWS: CPT

## 2021-12-16 PROCEDURE — 99203 OFFICE O/P NEW LOW 30 MIN: CPT | Performed by: ORTHOPAEDIC SURGERY

## 2021-12-22 ENCOUNTER — EVALUATION (OUTPATIENT)
Dept: PHYSICAL THERAPY | Facility: CLINIC | Age: 86
End: 2021-12-22
Payer: MEDICARE

## 2021-12-22 DIAGNOSIS — M70.62 TROCHANTERIC BURSITIS OF BOTH HIPS: ICD-10-CM

## 2021-12-22 DIAGNOSIS — M70.61 TROCHANTERIC BURSITIS OF BOTH HIPS: ICD-10-CM

## 2021-12-22 DIAGNOSIS — M16.0 PRIMARY OSTEOARTHRITIS OF BOTH HIPS: Primary | ICD-10-CM

## 2021-12-22 PROCEDURE — 97162 PT EVAL MOD COMPLEX 30 MIN: CPT | Performed by: PHYSICAL THERAPIST

## 2021-12-27 ENCOUNTER — OFFICE VISIT (OUTPATIENT)
Dept: PHYSICAL THERAPY | Facility: CLINIC | Age: 86
End: 2021-12-27
Payer: MEDICARE

## 2021-12-27 DIAGNOSIS — M70.62 TROCHANTERIC BURSITIS OF BOTH HIPS: ICD-10-CM

## 2021-12-27 DIAGNOSIS — M70.61 TROCHANTERIC BURSITIS OF BOTH HIPS: ICD-10-CM

## 2021-12-27 DIAGNOSIS — M16.0 PRIMARY OSTEOARTHRITIS OF BOTH HIPS: Primary | ICD-10-CM

## 2021-12-27 PROCEDURE — 97110 THERAPEUTIC EXERCISES: CPT | Performed by: PHYSICAL THERAPIST

## 2021-12-27 PROCEDURE — 97112 NEUROMUSCULAR REEDUCATION: CPT | Performed by: PHYSICAL THERAPIST

## 2021-12-30 ENCOUNTER — OFFICE VISIT (OUTPATIENT)
Dept: PHYSICAL THERAPY | Facility: CLINIC | Age: 86
End: 2021-12-30
Payer: MEDICARE

## 2021-12-30 DIAGNOSIS — M16.0 PRIMARY OSTEOARTHRITIS OF BOTH HIPS: Primary | ICD-10-CM

## 2021-12-30 PROCEDURE — 97140 MANUAL THERAPY 1/> REGIONS: CPT

## 2021-12-30 PROCEDURE — 97110 THERAPEUTIC EXERCISES: CPT

## 2022-01-03 ENCOUNTER — OFFICE VISIT (OUTPATIENT)
Dept: PHYSICAL THERAPY | Facility: CLINIC | Age: 87
End: 2022-01-03
Payer: MEDICARE

## 2022-01-03 DIAGNOSIS — M16.0 PRIMARY OSTEOARTHRITIS OF BOTH HIPS: Primary | ICD-10-CM

## 2022-01-03 DIAGNOSIS — M70.61 TROCHANTERIC BURSITIS OF BOTH HIPS: ICD-10-CM

## 2022-01-03 DIAGNOSIS — M70.62 TROCHANTERIC BURSITIS OF BOTH HIPS: ICD-10-CM

## 2022-01-03 PROCEDURE — 97112 NEUROMUSCULAR REEDUCATION: CPT

## 2022-01-03 PROCEDURE — 97110 THERAPEUTIC EXERCISES: CPT

## 2022-01-03 NOTE — PROGRESS NOTES
Daily Note     Today's date: 1/3/2022  Patient name: Pablito Caballero  : 1935  MRN: [id number]  Referring provider: Sadiq Aguirre MD  Dx:   Encounter Diagnosis     ICD-10-CM    1  Primary osteoarthritis of both hips  M16 0    2  Trochanteric bursitis of both hips  M70 61     M70 62                   Subjective: Pt reports not much change in his sxs  Cont's c/o's pain at the L SI jt  Objective: See treatment diary below      Assessment: Tolerated treatment fair  Patient given trial of CP t/o ex's  Pt unsure if CP helped  Slightly unsteady w/ initial sit>stand  Pt defers using SPC  Pt needs cont'd LE strengthening  Pt w/ weakness in the hips daniela in abductors  Plan: Continue per plan of care  Progress treatment as tolerated         Dx: B hip and knee OA  EPOC: 22  CO-MORBIDITIES:  PERSONAL FACTORS: recent death of spouse  Precautions: moderate to severe OA      Manuals 12/27 12/30 1/3          Gentle hip PROM/ MFR L th JK JK                        5' 10' 10'                       Neuro Re-Ed             Sidestepping  2 laps np 3 laps          bridges 10x5" 10x5" 10x5"          clamshells 10 nv 10          Lunge onto step   No UE  10"x10 bl                                                 Ther Ex             bike 5' 5' 6'          Standing HR 10 15 20          Stand gastroc stretch 1' B 1' B 1' B          Supine SLR 5  ea 10 10          S/l hip abduction 5 AA nv 10          Adductor ball squeeze 10x5" 10x5" 10x5"          HS stretch             Piriformis stretch 20"x3 20"x3 Hip 20"x3          SKTC 20"x3 20"x3           LAQ 10"x10 ea 10"x10 ea 10"x10 ea                       Gait Training                                       Modalities             Cp post tx   T/o supine

## 2022-01-05 ENCOUNTER — APPOINTMENT (OUTPATIENT)
Dept: PHYSICAL THERAPY | Facility: CLINIC | Age: 87
End: 2022-01-05
Payer: MEDICARE

## 2022-01-10 ENCOUNTER — OFFICE VISIT (OUTPATIENT)
Dept: PHYSICAL THERAPY | Facility: CLINIC | Age: 87
End: 2022-01-10
Payer: MEDICARE

## 2022-01-10 DIAGNOSIS — M70.62 TROCHANTERIC BURSITIS OF BOTH HIPS: ICD-10-CM

## 2022-01-10 DIAGNOSIS — M70.61 TROCHANTERIC BURSITIS OF BOTH HIPS: ICD-10-CM

## 2022-01-10 DIAGNOSIS — M16.0 PRIMARY OSTEOARTHRITIS OF BOTH HIPS: Primary | ICD-10-CM

## 2022-01-10 PROCEDURE — 97110 THERAPEUTIC EXERCISES: CPT | Performed by: PHYSICAL THERAPIST

## 2022-01-10 PROCEDURE — 97112 NEUROMUSCULAR REEDUCATION: CPT | Performed by: PHYSICAL THERAPIST

## 2022-01-10 PROCEDURE — 97140 MANUAL THERAPY 1/> REGIONS: CPT | Performed by: PHYSICAL THERAPIST

## 2022-01-10 NOTE — PROGRESS NOTES
Daily Note     Today's date: 1/10/2022  Patient name: Angélica Wu  : 1935  MRN: [id number]  Referring provider: Danielle Smith MD  Dx:   Encounter Diagnosis     ICD-10-CM    1  Primary osteoarthritis of both hips  M16 0    2  Trochanteric bursitis of both hips  M70 61     M70 62                   Subjective: Pt reports his hip is sore after therapy  Pt does not experience much pain during therapy, but then afterwards, the pain is worse  Pt was using his stationary bike and performing his stretches  Objective: See treatment diary below      Assessment: Tolerated treatment well  Patient exhibited good technique with therapeutic exercises  Pt had increased pain with hip ER stretch  Held SLR and s/l hip abduction to see if they are causing more pain  Plan: Continue per plan of care  Dx: B hip and knee OA  EPOC: 22  CO-MORBIDITIES:  PERSONAL FACTORS: recent death of spouse  Precautions: moderate to severe OA      Manuals 12/27 12/30 1/3 1/10         Gentle hip PROM/ MFR L th JK JK th                       5' 10' 10' 10'                      Neuro Re-Ed             Sidestepping  2 laps np 3 laps 2 laps         bridges 10x5" 10x5" 10x5" 10x5"         clamshells 10 nv 10 10         Lunge onto step   No UE  10"x10 bl 10x10"                                                Ther Ex             bike 5' 5' 6' 6'         Standing HR 10 15 20 20         Stand gastroc stretch 1' B 1' B 1' B 1' B         Supine SLR 5  ea 10 10 hold         S/l hip abduction 5 AA nv 10 hold         Adductor ball squeeze 10x5" 10x5" 10x5" 10x5"         HS stretch    20"x3 sit         Piriformis stretch 20"x3 20"x3 Hip 20"x3 Hold p!          SKTC 20"x3 20"x3  20"x3         LAQ 10"x10 ea 10"x10 ea 10"x10 ea 10x10" ea                      Gait Training                                       Modalities             Cp post tx   T/o supine

## 2022-01-12 ENCOUNTER — OFFICE VISIT (OUTPATIENT)
Dept: PHYSICAL THERAPY | Facility: CLINIC | Age: 87
End: 2022-01-12
Payer: MEDICARE

## 2022-01-12 DIAGNOSIS — M70.62 TROCHANTERIC BURSITIS OF BOTH HIPS: ICD-10-CM

## 2022-01-12 DIAGNOSIS — M70.61 TROCHANTERIC BURSITIS OF BOTH HIPS: ICD-10-CM

## 2022-01-12 DIAGNOSIS — M16.0 PRIMARY OSTEOARTHRITIS OF BOTH HIPS: Primary | ICD-10-CM

## 2022-01-12 PROCEDURE — 97110 THERAPEUTIC EXERCISES: CPT

## 2022-01-12 PROCEDURE — 97140 MANUAL THERAPY 1/> REGIONS: CPT

## 2022-01-12 NOTE — PROGRESS NOTES
Daily Note     Today's date: 2022  Patient name: Mark Plata  : 1935  MRN: [id number]  Referring provider: Linn Kehr, MD  Dx:   Encounter Diagnosis     ICD-10-CM    1  Primary osteoarthritis of both hips  M16 0    2  Trochanteric bursitis of both hips  M70 61     M70 62                   Subjective: Pt reports he is usually very sore post HEP except for the bike and stretches  Reports stopping all other ex's  Objective: See treatment diary below      Assessment: Tolerated treatment fair due to increased L hip pain  Patient w/ tight mm in the lat L thigh  Pt reports that is where most his pain comes from  Reviewed stretches and gave pt updated HEP  Pt trial of working on his own until follow up w/ ortho  Plan: Pt put on HOLD/possible DC per pt request      Dx: B hip and knee OA  EPOC: 22  CO-MORBIDITIES:  PERSONAL FACTORS: recent death of spouse  Precautions: moderate to severe OA      Manuals 12/27 12/30 1/3 1/10 1/12        Gentle hip PROM/ MFR L th JK JK th JK                      5' 10' 10' 10' 20''                     Neuro Re-Ed             Sidestepping  2 laps np 3 laps 2 laps         bridges 10x5" 10x5" 10x5" 10x5"         clamshells 10 nv 10 10         Lunge onto step   No UE  10"x10 bl 10x10"                                                Ther Ex             bike 5' 5' 6' 6' 6'        Standing HR 10 15 20 20         Stand gastroc stretch 1' B 1' B 1' B 1' B 20"x3 B        Supine SLR 5  ea 10 10 hold         S/l hip abduction 5 AA nv 10 hold         Adductor ball squeeze 10x5" 10x5" 10x5" 10x5"         HS stretch    20"x3 sit 20"x3 sit        Piriformis stretch 20"x3 20"x3 Hip 20"x3 Hold p!  Sit gentle 20"x3        SKTC 20"x3 20"x3  20"x3         LAQ 10"x10 ea 10"x10 ea 10"x10 ea 10x10" ea                      Gait Training                                       Modalities             Cp post tx   T/o supine

## 2022-01-17 ENCOUNTER — APPOINTMENT (OUTPATIENT)
Dept: PHYSICAL THERAPY | Facility: CLINIC | Age: 87
End: 2022-01-17
Payer: MEDICARE

## 2022-01-19 ENCOUNTER — APPOINTMENT (OUTPATIENT)
Dept: PHYSICAL THERAPY | Facility: CLINIC | Age: 87
End: 2022-01-19
Payer: MEDICARE

## 2022-02-08 ENCOUNTER — OFFICE VISIT (OUTPATIENT)
Dept: OBGYN CLINIC | Facility: CLINIC | Age: 87
End: 2022-02-08
Payer: MEDICARE

## 2022-02-08 VITALS
SYSTOLIC BLOOD PRESSURE: 128 MMHG | WEIGHT: 185 LBS | DIASTOLIC BLOOD PRESSURE: 80 MMHG | BODY MASS INDEX: 27.4 KG/M2 | HEIGHT: 69 IN

## 2022-02-08 DIAGNOSIS — M70.61 TROCHANTERIC BURSITIS OF BOTH HIPS: ICD-10-CM

## 2022-02-08 DIAGNOSIS — M70.62 TROCHANTERIC BURSITIS OF BOTH HIPS: ICD-10-CM

## 2022-02-08 DIAGNOSIS — M16.0 PRIMARY OSTEOARTHRITIS OF BOTH HIPS: Primary | ICD-10-CM

## 2022-02-08 PROCEDURE — 99213 OFFICE O/P EST LOW 20 MIN: CPT | Performed by: ORTHOPAEDIC SURGERY

## 2022-02-08 NOTE — PROGRESS NOTES
Assessment:     1  Primary osteoarthritis of both hips    2  Trochanteric bursitis of both hips        Plan:     Problem List Items Addressed This Visit        Musculoskeletal and Integument    Primary osteoarthritis of both hips - Primary    Trochanteric bursitis of both hips        Reviewed with patient again he has severe left hip arthritis, moderate to severe on right  He is not interested in total hip replacements  He will maintain HEP as shown by physical therapy  Stationary bike for low impact exercise  If pain in lateral hips increases we can offer cortisone injections if pain unbearable  Tylenol, aspercreme, voltaren gel for pain  See patient back as needed  All patient's questions were answered to her satisfaction  This note is created using dictation transcription  It may contain typographical errors, grammatical errors, improperly dictated words, background noise and other errors  Subjective:     Patient ID: Rosaline Holland is a 80 y o  male  Chief Complaint:  80 yr old male in for follow up regarding both hips  Treating for osteoarthritis of both hips severe left, moderate to severe right, trochanteric bursitis bilateral  He has attended 5 sessions of physical therapy  He cancelled last 2 sessions as it was causing more pain  Left hip bothers him more on daily basis  Walking unassisted currently  Unable to use nsaids as on coumadin  Using stationary bike for exercise  Patient is still not interested in total hip replacement       Allergy:  Allergies   Allergen Reactions    Adhesive [Medical Tape] Rash     Medications:  all current active meds have been reviewed  Past Medical History:  Past Medical History:   Diagnosis Date    Amputation of toe ([address] )     3rd toe;  accident    Arthritis      Past Surgical History:  Past Surgical History:   Procedure Laterality Date    CATARACT EXTRACTION, BILATERAL      COLONOSCOPY N/A 11/15/2018    Procedure: COLONOSCOPY;  Surgeon: Marcelina Lindo MD; Location:  MAIN OR;  Service: Gastroenterology    CORONARY ARTERY BYPASS GRAFT      ESOPHAGOGASTRODUODENOSCOPY N/A 2018    Procedure: ESOPHAGOGASTRODUODENOSCOPY (EGD); Surgeon: Lety Wheat MD;  Location:  MAIN OR;  Service: Gastroenterology    MITRAL VALVE REPLACEMENT       Family History:  Family History   Problem Relation Age of Onset    Alzheimer's disease Mother      Social History:  Social History     Substance and Sexual Activity   Alcohol Use Yes    Comment: occasional     Social History     Substance and Sexual Activity   Drug Use No     Social History     Tobacco Use   Smoking Status Former Smoker    Quit date: 1985    Years since quittin 2   Smokeless Tobacco Never Used     Review of Systems   Constitutional: Negative for chills and fever  HENT: Negative for ear pain and sore throat  Eyes: Negative for pain and visual disturbance  Respiratory: Negative for cough and shortness of breath  Cardiovascular: Negative for chest pain and palpitations  Gastrointestinal: Negative for abdominal pain and vomiting  Genitourinary: Negative for dysuria and hematuria  Musculoskeletal: Positive for arthralgias (Left hip)  Negative for back pain and gait problem  Skin: Negative for color change and rash  Neurological: Negative for seizures and syncope  Psychiatric/Behavioral: Negative  All other systems reviewed and are negative  Objective:  BP Readings from Last 1 Encounters:   22 128/80      Wt Readings from Last 1 Encounters:   22 83 9 kg (185 lb)      BMI:   Estimated body mass index is 27 52 kg/m² as calculated from the following:    Height as of this encounter: 5' 8 75" (1 746 m)  Weight as of this encounter: 83 9 kg (185 lb)  BSA:   Estimated body surface area is 1 99 meters squared as calculated from the following:    Height as of this encounter: 5' 8 75" (1 746 m)  Weight as of this encounter: 83 9 kg (185 lb)     Physical Exam  Vitals and nursing note reviewed  Constitutional:       Appearance: Normal appearance  He is well-developed  HENT:      Head: Normocephalic and atraumatic  Right Ear: External ear normal       Left Ear: External ear normal    Eyes:      Extraocular Movements: Extraocular movements intact  Conjunctiva/sclera: Conjunctivae normal    Pulmonary:      Effort: Pulmonary effort is normal    Musculoskeletal:      Cervical back: Neck supple  Skin:     General: Skin is warm  Neurological:      Mental Status: He is alert and oriented to person, place, and time  Psychiatric:         Mood and Affect: Mood normal          Behavior: Behavior normal        Right Hip Exam     Tenderness   The patient is experiencing no tenderness  Range of Motion   Flexion: 120   External rotation: 50   Internal rotation: 30     Muscle Strength   The patient has normal right hip strength  Tests   BRANDIE: negative    Other   Erythema: absent  Sensation: normal  Pulse: present      Left Hip Exam     Tenderness   The patient is experiencing no tenderness  Range of Motion   Flexion: 110   External rotation: 30   Internal rotation: 10     Muscle Strength   The patient has normal left hip strength       Tests   BRANDIE: positive    Other   Erythema: absent  Sensation: normal  Pulse: present            no new imaging to review      Scribe Attestation    I,:  Estela Rees am acting as a scribe while in the presence of the attending physician :       I,:  Chrystal Spatz, MD personally performed the services described in this documentation    as scribed in my presence :

## 2022-10-28 DIAGNOSIS — E78.2 MIXED HYPERLIPIDEMIA: ICD-10-CM

## 2022-10-28 DIAGNOSIS — I10 ESSENTIAL HYPERTENSION: Primary | ICD-10-CM

## 2022-11-01 ENCOUNTER — APPOINTMENT (OUTPATIENT)
Dept: LAB | Facility: HOSPITAL | Age: 87
End: 2022-11-01

## 2022-11-01 DIAGNOSIS — I10 ESSENTIAL HYPERTENSION: ICD-10-CM

## 2022-11-01 DIAGNOSIS — E78.2 MIXED HYPERLIPIDEMIA: ICD-10-CM

## 2022-11-01 LAB
ALBUMIN SERPL BCP-MCNC: 3.9 G/DL (ref 3.5–5)
ALP SERPL-CCNC: 66 U/L (ref 46–116)
ALT SERPL W P-5'-P-CCNC: 25 U/L (ref 12–78)
ANION GAP SERPL CALCULATED.3IONS-SCNC: 5 MMOL/L (ref 4–13)
AST SERPL W P-5'-P-CCNC: 21 U/L (ref 5–45)
BILIRUB SERPL-MCNC: 0.71 MG/DL (ref 0.2–1)
BUN SERPL-MCNC: 31 MG/DL (ref 5–25)
CALCIUM SERPL-MCNC: 9.4 MG/DL (ref 8.3–10.1)
CHLORIDE SERPL-SCNC: 108 MMOL/L (ref 96–108)
CHOLEST SERPL-MCNC: 117 MG/DL
CO2 SERPL-SCNC: 29 MMOL/L (ref 21–32)
CREAT SERPL-MCNC: 1.24 MG/DL (ref 0.6–1.3)
GFR SERPL CREATININE-BSD FRML MDRD: 51 ML/MIN/1.73SQ M
GLUCOSE P FAST SERPL-MCNC: 102 MG/DL (ref 65–99)
HDLC SERPL-MCNC: 40 MG/DL
LDLC SERPL CALC-MCNC: 58 MG/DL (ref 0–100)
POTASSIUM SERPL-SCNC: 4.2 MMOL/L (ref 3.5–5.3)
PROT SERPL-MCNC: 7.4 G/DL (ref 6.4–8.4)
SODIUM SERPL-SCNC: 142 MMOL/L (ref 135–147)
TRIGL SERPL-MCNC: 97 MG/DL

## 2022-11-22 ENCOUNTER — OFFICE VISIT (OUTPATIENT)
Dept: FAMILY MEDICINE CLINIC | Facility: CLINIC | Age: 87
End: 2022-11-22

## 2022-11-22 VITALS
TEMPERATURE: 96 F | WEIGHT: 187 LBS | OXYGEN SATURATION: 100 % | HEIGHT: 69 IN | DIASTOLIC BLOOD PRESSURE: 38 MMHG | HEART RATE: 65 BPM | SYSTOLIC BLOOD PRESSURE: 138 MMHG | BODY MASS INDEX: 27.7 KG/M2

## 2022-11-22 DIAGNOSIS — D50.0 IRON DEFICIENCY ANEMIA DUE TO CHRONIC BLOOD LOSS: ICD-10-CM

## 2022-11-22 DIAGNOSIS — Z00.00 MEDICARE ANNUAL WELLNESS VISIT, SUBSEQUENT: Primary | ICD-10-CM

## 2022-11-22 DIAGNOSIS — Z23 NEED FOR INFLUENZA VACCINATION: ICD-10-CM

## 2022-11-22 DIAGNOSIS — I10 ESSENTIAL HYPERTENSION: ICD-10-CM

## 2022-11-22 DIAGNOSIS — I48.0 PAROXYSMAL ATRIAL FIBRILLATION (HCC): ICD-10-CM

## 2022-11-22 DIAGNOSIS — N18.31 STAGE 3A CHRONIC KIDNEY DISEASE (HCC): ICD-10-CM

## 2022-11-22 DIAGNOSIS — D68.9 COAGULOPATHY (HCC): ICD-10-CM

## 2022-11-22 DIAGNOSIS — E78.2 MIXED HYPERLIPIDEMIA: ICD-10-CM

## 2022-11-22 DIAGNOSIS — F33.9 DEPRESSION, RECURRENT (HCC): ICD-10-CM

## 2022-11-22 NOTE — PATIENT INSTRUCTIONS
Medicare Preventive Visit Patient Instructions  Thank you for completing your Welcome to Medicare Visit or Medicare Annual Wellness Visit today  Your next wellness visit will be due in one year (11/23/2023)  The screening/preventive services that you may require over the next 5-10 years are detailed below  Some tests may not apply to you based off risk factors and/or age  Screening tests ordered at today's visit but not completed yet may show as past due  Also, please note that scanned in results may not display below  Preventive Screenings:  Service Recommendations Previous Testing/Comments   Colorectal Cancer Screening  · Colonoscopy    · Fecal Occult Blood Test (FOBT)/Fecal Immunochemical Test (FIT)  · Fecal DNA/Cologuard Test  · Flexible Sigmoidoscopy Age: 39-70 years old   Colonoscopy: every 10 years (May be performed more frequently if at higher risk)  OR  FOBT/FIT: every 1 year  OR  Cologuard: every 3 years  OR  Sigmoidoscopy: every 5 years  Screening may be recommended earlier than age 39 if at higher risk for colorectal cancer  Also, an individualized decision between you and your healthcare provider will decide whether screening between the ages of 74-80 would be appropriate   Colonoscopy: Not on file  FOBT/FIT: Not on file  Cologuard: Not on file  Sigmoidoscopy: Not on file    Screening Not Indicated     Prostate Cancer Screening Individualized decision between patient and health care provider in men between ages of 53-78   Medicare will cover every 12 months beginning on the day after your 50th birthday PSA: No results in last 5 years     Screening Not Indicated     Hepatitis C Screening Once for adults born between [birth year] and 1965  More frequently in patients at high risk for Hepatitis C Hep C Antibody: Not on file        Diabetes Screening 1-2 times per year if you're at risk for diabetes or have pre-diabetes Fasting glucose: 102 mg/dL (11/1/2022)  A1C: 5 7 % (10/15/2018)  Screening Current Cholesterol Screening Once every 5 years if you don't have a lipid disorder  May order more often based on risk factors  Lipid panel: 11/01/2022  Screening Not Indicated  History Lipid Disorder      Other Preventive Screenings Covered by Medicare:  1  Abdominal Aortic Aneurysm (AAA) Screening: covered once if your at risk  You're considered to be at risk if you have a family history of AAA or a male between the age of 73-68 who smoking at least 100 cigarettes in your lifetime  2  Lung Cancer Screening: covers low dose CT scan once per year if you meet all of the following conditions: (1) Age 50-69; (2) No signs or symptoms of lung cancer; (3) Current smoker or have quit smoking within the last 15 years; (4) You have a tobacco smoking history of at least 20 pack years (packs per day x number of years you smoked); (5) You get a written order from a healthcare provider  3  Glaucoma Screening: covered annually if you're considered high risk: (1) You have diabetes OR (2) Family history of glaucoma OR (3)  aged 48 and older OR (3)  American aged 72 and older  3  Osteoporosis Screening: covered every 2 years if you meet one of the following conditions: (1) Have a vertebral abnormality; (2) On glucocorticoid therapy for more than 3 months; (3) Have primary hyperparathyroidism; (4) On osteoporosis medications and need to assess response to drug therapy  5  HIV Screening: covered annually if you're between the age of 12-76  Also covered annually if you are younger than 13 and older than 72 with risk factors for HIV infection  For pregnant patients, it is covered up to 3 times per pregnancy      Immunizations:  Immunization Recommendations   Influenza Vaccine Annual influenza vaccination during flu season is recommended for all persons aged >= 6 months who do not have contraindications   Pneumococcal Vaccine   * Pneumococcal conjugate vaccine = PCV13 (Prevnar 13), PCV15 (Vaxneuvance), PCV20 (Prevnar 20)  * Pneumococcal polysaccharide vaccine = PPSV23 (Pneumovax) Adults 2364 years old: 1-3 doses may be recommended based on certain risk factors  Adults 72 years old: 1-2 doses may be recommended based off what pneumonia vaccine you previously received   Hepatitis B Vaccine 3 dose series if at intermediate or high risk (ex: diabetes, end stage renal disease, liver disease)   Tetanus (Td) Vaccine - COST NOT COVERED BY MEDICARE PART B Following completion of primary series, a booster dose should be given every 10 years to maintain immunity against tetanus  Td may also be given as tetanus wound prophylaxis  Tdap Vaccine - COST NOT COVERED BY MEDICARE PART B Recommended at least once for all adults  For pregnant patients, recommended with each pregnancy  Shingles Vaccine (Shingrix) - COST NOT COVERED BY MEDICARE PART B  2 shot series recommended in those aged 48 and above     Health Maintenance Due:  There are no preventive care reminders to display for this patient  Immunizations Due:      Topic Date Due   • Hepatitis B Vaccine (1 of 3 - 3-dose series) Never done   • COVID-19 Vaccine (3 - Booster for Moderna series) 08/11/2021   • Influenza Vaccine (1) 09/01/2022     Advance Directives   What are advance directives? Advance directives are legal documents that state your wishes and plans for medical care  These plans are made ahead of time in case you lose your ability to make decisions for yourself  Advance directives can apply to any medical decision, such as the treatments you want, and if you want to donate organs  What are the types of advance directives? There are many types of advance directives, and each state has rules about how to use them  You may choose a combination of any of the following:  · Living will: This is a written record of the treatment you want  You can also choose which treatments you do not want, which to limit, and which to stop at a certain time   This includes surgery, medicine, IV fluid, and tube feedings  · Durable power of  for healthcare [facility]): This is a written record that states who you want to make healthcare choices for you when you are unable to make them for yourself  This person, called a proxy, is usually a family member or a friend  You may choose more than 1 proxy  · Do not resuscitate (DNR) order:  A DNR order is used in case your heart stops beating or you stop breathing  It is a request not to have certain forms of treatment, such as CPR  A DNR order may be included in other types of advance directives  · Medical directive: This covers the care that you want if you are in a coma, near death, or unable to make decisions for yourself  You can list the treatments you want for each condition  Treatment may include pain medicine, surgery, blood transfusions, dialysis, IV or tube feedings, and a ventilator (breathing machine)  · Values history: This document has questions about your views, beliefs, and how you feel and think about life  This information can help others choose the care that you would choose  Why are advance directives important? An advance directive helps you control your care  Although spoken wishes may be used, it is better to have your wishes written down  Spoken wishes can be misunderstood, or not followed  Treatments may be given even if you do not want them  An advance directive may make it easier for your family to make difficult choices about your care  Depression   Depression  is a medical condition that causes feelings of sadness or hopelessness that do not go away  Depression may cause you to lose interest in things you used to enjoy  These feelings may interfere with your daily life  Call your local emergency number (911 in the [address] [facility]) if:   · You think about harming yourself or someone else  · You have done something on purpose to hurt yourself    The following resources are available at any time to help you, if needed:   · [employer] Suicide Prevention Lifeline: 3-858.569.6833 (2-602-084-EGVJ)   · Suicide Hotline: 3-489.871.3444 (0-739-HSLISBV)   · For a list of international numbers: https://save org/find-help/international-resources/  Treatment for depression may include medicine to relieve depression  Medicine is often used together with therapy  Therapy is a way for you to talk about your feelings and anything that may be causing depression  Therapy can be done alone or in a group  It may also be done with family members or a significant other  · Get regular physical activity  · Create a regular sleep schedule  · Eat a variety of healthy foods  · Do not drink alcohol or use drugs  Weight Management   Why it is important to manage your weight:  Being overweight increases your risk of health conditions such as heart disease, high blood pressure, type 2 diabetes, and certain types of cancer  It can also increase your risk for osteoarthritis, sleep apnea, and other respiratory problems  Aim for a slow, steady weight loss  Even a small amount of weight loss can lower your risk of health problems  How to lose weight safely:  A safe and healthy way to lose weight is to eat fewer calories and get regular exercise  You can lose up about 1 pound a week by decreasing the number of calories you eat by 500 calories each day  Healthy meal plan for weight management:  A healthy meal plan includes a variety of foods, contains fewer calories, and helps you stay healthy  A healthy meal plan includes the following:  · Eat whole-grain foods more often  A healthy meal plan should contain fiber  Fiber is the part of grains, fruits, and vegetables that is not broken down by your body  Whole-grain foods are healthy and provide extra fiber in your diet  Some examples of whole-grain foods are whole-wheat breads and pastas, oatmeal, brown rice, and bulgur  · Eat a variety of vegetables every day    Include dark, leafy greens such as spinach, kale, axel greens, and mustard greens  Eat yellow and orange vegetables such as carrots, sweet potatoes, and winter squash  · Eat a variety of fruits every day  Choose fresh or canned fruit (canned in its own juice or light syrup) instead of juice  Fruit juice has very little or no fiber  · Eat low-fat dairy foods  Drink fat-free (skim) milk or 1% milk  Eat fat-free yogurt and low-fat cottage cheese  Try low-fat cheeses such as mozzarella and other reduced-fat cheeses  · Choose meat and other protein foods that are low in fat  Choose beans or other legumes such as split peas or lentils  Choose fish, skinless poultry (chicken or turkey), or lean cuts of red meat (beef or pork)  Before you cook meat or poultry, cut off any visible fat  · Use less fat and oil  Try baking foods instead of frying them  Add less fat, such as margarine, sour cream, regular salad dressing and mayonnaise to foods  Eat fewer high-fat foods  Some examples of high-fat foods include french fries, doughnuts, ice cream, and cakes  · Eat fewer sweets  Limit foods and drinks that are high in sugar  This includes candy, cookies, regular soda, and sweetened drinks  Exercise:  Exercise at least 30 minutes per day on most days of the week  Some examples of exercise include walking, biking, dancing, and swimming  You can also fit in more physical activity by taking the stairs instead of the elevator or parking farther away from stores  Ask your healthcare provider about the best exercise plan for you  © Copyright [employer] 2018 Information is for End User's use only and may not be sold, redistributed or otherwise used for commercial purposes   All illustrations and images included in CareNotes® are the copyrighted property of [employer] M , [employer]  or [address] [place] [address] [employer]

## 2022-11-22 NOTE — PROGRESS NOTES
Assessment and Plan:     Problem List Items Addressed This Visit        Cardiovascular and Mediastinum    Essential hypertension    Paroxysmal atrial fibrillation (HCC)       Hematopoietic and Hemostatic    Coagulopathy (HCC)       Genitourinary    Stage 3a chronic kidney disease ([facility] )       Other    Anemia    Relevant Orders    CBC and differential    Mixed hyperlipidemia    Depression, recurrent ([facility] )   Other Visit Diagnoses     Medicare annual wellness visit, subsequent    -  Primary    Need for influenza vaccination        Relevant Orders    influenza vaccine, high-dose, PF 0 7 mL (FLUZONE HIGH-DOSE)        BMI Counseling: Body mass index is 27 62 kg/m²  The BMI is above normal  Nutrition recommendations include decreasing portion sizes  Exercise recommendations include moderate physical activity 150 minutes/week  No pharmacotherapy was ordered  Rationale for BMI follow-up plan is due to patient being overweight or obese  Depression Screening and Follow-up Plan: Patient's depression screening was positive with a PHQ-2 score of 6  Their PHQ-9 score was 17  Preventive health issues were discussed with patient, and age appropriate screening tests were ordered as noted in patient's After Visit Summary  Personalized health advice and appropriate referrals for health education or preventive services given if needed, as noted in patient's After Visit Summary  History of Present Illness:     Patient presents for a Medicare Wellness Visit    HPI   Patient Care Team:  Wei Reid MD as PCP - General (Family Medicine)  MD Johnathon Garzon PA-C Felicity Bair, MD as Endoscopist     Review of Systems:     Review of Systems   Constitutional: Negative for fatigue, fever and unexpected weight change  HENT: Negative for congestion, sinus pain and sore throat  Eyes: Negative for visual disturbance  Respiratory: Negative for shortness of breath and wheezing      Cardiovascular: Negative for chest pain and palpitations  Gastrointestinal: Negative for abdominal pain, nausea and vomiting  Musculoskeletal: Negative  Negative for arthralgias and myalgias  Neurological: Negative for syncope, weakness and numbness  Psychiatric/Behavioral: Negative  Negative for confusion, dysphoric mood and suicidal ideas  Problem List:     Patient Active Problem List   Diagnosis   • Anemia   • Anxiety   • Arteriosclerotic cardiovascular disease   • Arthritis   • Herpes zoster   • Hyperglycemia   • Mixed hyperlipidemia   • Essential hypertension   • Hypocalcemia   • Insomnia   • GI bleed   • Coagulopathy (HCC)   • Paroxysmal atrial fibrillation (HCC)   • Primary osteoarthritis of both hips   • Trochanteric bursitis of both hips   • Depression, recurrent (HCC)   • Stage 3a chronic kidney disease ([facility] )      Past Medical and Surgical History:     Past Medical History:   Diagnosis Date   • [facility] ([facility] )     3rd toe;  accident   • Arthritis      Past Surgical History:   Procedure Laterality Date   • CATARACT EXTRACTION, BILATERAL     • COLONOSCOPY N/A 11/15/2018    Procedure: COLONOSCOPY;  Surgeon: Gonzalez Parker MD;  Location:  MAIN OR;  Service: Gastroenterology   • CORONARY ARTERY BYPASS GRAFT     • ESOPHAGOGASTRODUODENOSCOPY N/A 2018    Procedure: ESOPHAGOGASTRODUODENOSCOPY (EGD);   Surgeon: Sada Martinez MD;  Location:  MAIN OR;  Service: Gastroenterology   • MITRAL VALVE REPLACEMENT        Family History:     Family History   Problem Relation Age of Onset   • Alzheimer's disease Mother       Social History:     Social History     Socioeconomic History   • Marital status: /Civil Union     Spouse name: None   • Number of children: None   • Years of education: None   • Highest education level: None   Occupational History   • Occupation: retired   Tobacco Use   • Smoking status: Former     Types: Cigarettes     Quit date: 1985     Years since quittin 0   • Smokeless tobacco: Never   Vaping Use   • Vaping Use: Never used   Substance and Sexual Activity   • Alcohol use: Yes     Comment: occasional   • Drug use: No   • Sexual activity: None   Other Topics Concern   • None   Social History Narrative    No smoke exposure     Social Determinants of Health     Financial Resource Strain: Low Risk    • Difficulty of Paying Living Expenses: Not hard at all   Food Insecurity: Not on file   Transportation Needs: No Transportation Needs   • Lack of Transportation (Medical): No   • Lack of Transportation (Non-Medical): No   Physical Activity: Not on file   Stress: Not on file   Social Connections: Not on file   Intimate Partner Violence: Not on file   Housing Stability: Not on file      Medications and Allergies:     Current Outpatient Medications   Medication Sig Dispense Refill   • atorvastatin (LIPITOR) 10 mg tablet Take by mouth     • ferrous sulfate 325 (65 Fe) mg tablet Take by mouth     • furosemide (LASIX) 20 mg tablet Take by mouth     • latanoprost (XALATAN) 0 005 % ophthalmic solution Administer 1 drop to both eyes daily at bedtime     • lisinopril (ZESTRIL) 5 mg tablet Take by mouth     • metoprolol tartrate (LOPRESSOR) 25 mg tablet Take by mouth     • potassium chloride (K-DUR,KLOR-CON) 20 mEq tablet Take by mouth     • warfarin (COUMADIN) 5 mg tablet Take by mouth       No current facility-administered medications for this visit       Allergies   Allergen Reactions   • Adhesive [Medical Tape] Rash      Immunizations:     Immunization History   Administered Date(s) Administered   • COVID-19 MODERNA VACC 0 5 ML IM 02/13/2021, 03/11/2021   • Influenza Quadrivalent Preservative Free 3 years and older IM 10/17/2014   • Influenza Quadrivalent, 6-35 Months IM 09/18/2015   • Influenza Split High Dose Preservative Free IM 09/20/2016, 10/04/2017   • Influenza, high dose seasonal 0 7 mL 10/23/2018, 09/16/2020, 10/05/2021   • Influenza, seasonal, injectable 11/01/2011, 12/03/2012, 12/02/2013   • Pneumococcal Conjugate 13-Valent 10/23/2018   • Pneumococcal Polysaccharide PPV23 10/01/2001   • TD (adult) Preservative Free 05/01/2008, 04/30/2019   • Td (adult), adsorbed 04/30/2019      Health Maintenance: There are no preventive care reminders to display for this patient  Topic Date Due   • Hepatitis B Vaccine (1 of 3 - 3-dose series) Never done   • COVID-19 Vaccine (3 - Booster for Moderna series) 08/11/2021   • Influenza Vaccine (1) 09/01/2022      Medicare Screening Tests and Risk Assessments:     Last Medicare Wellness visit information reviewed, patient interviewed and updates made to the record today  Health Risk Assessment:   Patient rates overall health as good  Patient feels that their physical health rating is same  Patient is dissatisfied with their life  Eyesight was rated as same  Hearing was rated as same  Patient feels that their emotional and mental health rating is slightly worse  Patients states they are never, rarely angry  Patient states they are often unusually tired/fatigued  Pain experienced in the last 7 days has been some  Patient's pain rating has been 3/10  Patient states that he has experienced no weight loss or gain in last 6 months  Depression Screening:   PHQ-2 Score: 6  PHQ-9 Score: 17      Fall Risk Screening: In the past year, patient has experienced: no history of falling in past year      Home Safety:  Patient does not have trouble with stairs inside or outside of their home  Patient has working smoke alarms and has no working carbon monoxide detector  Home safety hazards include: medications that cause fatigue and not having non-slip bath and/or shower mats  Nutrition:   Current diet is Regular  Medications:   Patient is currently taking over-the-counter supplements  OTC medications include: see medication list  Patient is able to manage medications       Activities of Daily Living (ADLs)/Instrumental Activities of Daily Living (IADLs):   Walk and transfer into and out of bed and chair?: Yes  Dress and groom yourself?: Yes    Bathe or shower yourself?: Yes    Feed yourself? Yes  Do your laundry/housekeeping?: Yes  Manage your money, pay your bills and track your expenses?: Yes  Make your own meals?: Yes    Do your own shopping?: Yes    Previous Hospitalizations:   Any hospitalizations or ED visits within the last 12 months?: No      Advance Care Planning:   Living will: Yes    Durable POA for healthcare: Yes    Advanced directive: Yes    Provider agrees with end of life decisions: Yes      Cognitive Screening:   Provider or family/friend/caregiver concerned regarding cognition?: No    PREVENTIVE SCREENINGS      Cardiovascular Screening:    General: Screening Not Indicated and History Lipid Disorder      Diabetes Screening:     General: Screening Current      Colorectal Cancer Screening:     General: Screening Not Indicated      Prostate Cancer Screening:    General: Screening Not Indicated      Osteoporosis Screening:    General: Risks and Benefits Discussed      Abdominal Aortic Aneurysm (AAA) Screening:    Risk factors include: tobacco use        General: Risks and Benefits Discussed      Lung Cancer Screening:     General: Screening Not Indicated      Hepatitis C Screening:    General: Patient Declines    Screening, Brief Intervention, and Referral to Treatment (SBIRT)    Screening  Typical number of drinks in a day: 0  Typical number of drinks in a week: 0  Interpretation: Low risk drinking behavior      AUDIT-C Screenin) How often did you have a drink containing alcohol in the past year? never  2) How many drinks did you have on a typical day when you were drinking in the past year? 0  3) How often did you have 6 or more drinks on one occasion in the past year? never    AUDIT-C Score: 0  Interpretation: Score 0-3 (male): Negative screen for alcohol misuse    Single Item Drug Screening:  How often have you used an illegal drug (including marijuana) or a prescription medication for non-medical reasons in the past year? never    Single Item Drug Screen Score: 0  Interpretation: Negative screen for possible drug use disorder    No results found  Physical Exam:     BP (!) 138/38 (BP Location: Left arm, Patient Position: Sitting, Cuff Size: Standard)   Pulse 65   Temp (!) 96 °F (35 6 °C) (Tympanic)   Ht 5' 9" (1 753 m)   Wt 84 8 kg (187 lb)   SpO2 100%   BMI 27 62 kg/m²     Physical Exam  Vitals and nursing note reviewed  Constitutional:       General: He is not in acute distress  Appearance: He is well-developed  HENT:      Head: Normocephalic and atraumatic  Eyes:      Conjunctiva/sclera: Conjunctivae normal    Cardiovascular:      Rate and Rhythm: Normal rate and regular rhythm  Heart sounds: No murmur heard  Pulmonary:      Effort: Pulmonary effort is normal  No respiratory distress  Breath sounds: Normal breath sounds  Abdominal:      Palpations: Abdomen is soft  Tenderness: There is no abdominal tenderness  Musculoskeletal:         General: No swelling  Cervical back: Neck supple  Skin:     General: Skin is warm and dry  Capillary Refill: Capillary refill takes less than 2 seconds  Neurological:      Mental Status: He is alert  Psychiatric:         Mood and Affect: Mood normal       Depression Screening Follow-up Plan: Patient's depression screening was positive with a PHQ-2 score of 6  Their PHQ-9 score was 17  Patient declines further evaluation by mental health professional and/or medications  They have no active suicidal ideations  Brief counseling provided and recommend additional follow-up/re-evaluation at next office visit      Aranza Rivera MD

## 2022-11-29 ENCOUNTER — APPOINTMENT (OUTPATIENT)
Dept: LAB | Facility: HOSPITAL | Age: 87
End: 2022-11-29

## 2022-11-29 DIAGNOSIS — D50.0 IRON DEFICIENCY ANEMIA DUE TO CHRONIC BLOOD LOSS: ICD-10-CM

## 2022-11-29 LAB
BASOPHILS # BLD AUTO: 0.07 THOUSANDS/ÂΜL (ref 0–0.1)
BASOPHILS NFR BLD AUTO: 1 % (ref 0–1)
EOSINOPHIL # BLD AUTO: 0.37 THOUSAND/ÂΜL (ref 0–0.61)
EOSINOPHIL NFR BLD AUTO: 7 % (ref 0–6)
ERYTHROCYTE [DISTWIDTH] IN BLOOD BY AUTOMATED COUNT: 14.4 % (ref 11.6–15.1)
HCT VFR BLD AUTO: 36.1 % (ref 36.5–49.3)
HGB BLD-MCNC: 11 G/DL (ref 12–17)
IMM GRANULOCYTES # BLD AUTO: 0.01 THOUSAND/UL (ref 0–0.2)
IMM GRANULOCYTES NFR BLD AUTO: 0 % (ref 0–2)
LYMPHOCYTES # BLD AUTO: 1.35 THOUSANDS/ÂΜL (ref 0.6–4.47)
LYMPHOCYTES NFR BLD AUTO: 24 % (ref 14–44)
MCH RBC QN AUTO: 28.5 PG (ref 26.8–34.3)
MCHC RBC AUTO-ENTMCNC: 30.5 G/DL (ref 31.4–37.4)
MCV RBC AUTO: 94 FL (ref 82–98)
MONOCYTES # BLD AUTO: 0.64 THOUSAND/ÂΜL (ref 0.17–1.22)
MONOCYTES NFR BLD AUTO: 11 % (ref 4–12)
NEUTROPHILS # BLD AUTO: 3.25 THOUSANDS/ÂΜL (ref 1.85–7.62)
NEUTS SEG NFR BLD AUTO: 57 % (ref 43–75)
NRBC BLD AUTO-RTO: 0 /100 WBCS
PLATELET # BLD AUTO: 188 THOUSANDS/UL (ref 149–390)
PMV BLD AUTO: 11.3 FL (ref 8.9–12.7)
RBC # BLD AUTO: 3.86 MILLION/UL (ref 3.88–5.62)
WBC # BLD AUTO: 5.69 THOUSAND/UL (ref 4.31–10.16)

## 2022-12-24 ENCOUNTER — APPOINTMENT (OUTPATIENT)
Dept: RADIOLOGY | Facility: HOSPITAL | Age: 87
End: 2022-12-24

## 2022-12-24 ENCOUNTER — NURSE TRIAGE (OUTPATIENT)
Dept: OTHER | Facility: OTHER | Age: 87
End: 2022-12-24

## 2022-12-24 ENCOUNTER — HOSPITAL ENCOUNTER (EMERGENCY)
Facility: HOSPITAL | Age: 87
Discharge: HOME | End: 2022-12-24
Attending: EMERGENCY MEDICINE

## 2022-12-24 VITALS
TEMPERATURE: 97.8 F | DIASTOLIC BLOOD PRESSURE: 80 MMHG | HEIGHT: 69 IN | BODY MASS INDEX: 27.7 KG/M2 | WEIGHT: 187 LBS | SYSTOLIC BLOOD PRESSURE: 145 MMHG | OXYGEN SATURATION: 98 % | RESPIRATION RATE: 18 BRPM | HEART RATE: 60 BPM

## 2022-12-24 DIAGNOSIS — U07.1 COVID-19: Primary | ICD-10-CM

## 2022-12-24 LAB
FLUAV RNA RESP QL NAA+PROBE: NEGATIVE
FLUBV RNA RESP QL NAA+PROBE: NEGATIVE
RSV RNA RESP QL NAA+PROBE: NEGATIVE
SARS-COV-2 RNA RESP QL NAA+PROBE: POSITIVE

## 2022-12-24 NOTE — TELEPHONE ENCOUNTER
Patient with cold symptoms that started 12/22  Patient c/o chest pain when he takes a deep breath, dry cough, weakness and fatigue  Patient temp 98 3  Patient advised to be seen in ED for chest pain, patient verbalized understanding and agrees with disposition  Reason for Disposition  • Patient sounds very sick or weak to the triager    Answer Assessment - Initial Assessment Questions  1  ONSET: "When did the symptoms start?"        12/22        2  COUGH: "Do you have a cough?" If yes, ask: "Describe the color of your sputum" (clear, white, yellow, green)      Dry cough    3  RESPIRATORY DISTRESS: "Describe your breathing "       Patient states he has chest pain when he takes a deep breath    4  FEVER: "Do you have a fever?" If Yes, ask: "What is your temperature, how was it measured, and when did it start?"     Temp 98 3 oral    5  OTHER SYMPTOMS: "Do you have any other symptoms?" (e g , sore throat, earache, wheezing, vomiting)      Fatigue and weakness    6   PREGNANCY: "Is there any chance you are pregnant?" "When was your last menstrual period?"      N/A    Protocols used: COMMON COLD-ADULT-

## 2022-12-24 NOTE — TELEPHONE ENCOUNTER
Regarding: chest congestion-raspy- fatigue  ----- Message from [facility] sent at 12/24/2022 11:05 AM EST -----  "My father has a temp of 98 3 (oral)    He sounds raspy, fatigue and his chest feels congested  "

## 2022-12-24 NOTE — ED PROVIDER NOTES
History  Chief Complaint   Patient presents with   • Cough     Pt to ED with cough  Cough since Wednesday, taking tylenol- last dose 0900  Patient is an 79 y/o M with h/o HTN, Hyperlipidemia that presents to the ED with cough, runny nose, sore throat that started 3 days ago  He denies sick contacts  No fevers, chills  He has been taking tylenol for his muscle aches  History provided by:  Patient  Cough  Cough characteristics:  Dry  Severity:  Moderate  Onset quality:  Gradual  Duration:  3 days  Timing:  Constant  Progression:  Unchanged  Chronicity:  New  Context: upper respiratory infection    Relieved by:  Nothing  Worsened by:  Nothing  Ineffective treatments:  None tried  Associated symptoms: rhinorrhea, sinus congestion and sore throat    Associated symptoms: no chest pain, no chills, no fever, no rash, no shortness of breath and no wheezing    Risk factors: no recent travel        Prior to Admission Medications   Prescriptions Last Dose Informant Patient Reported?  Taking?   atorvastatin (LIPITOR) 10 mg tablet   Yes No   Sig: Take by mouth   ferrous sulfate 325 (65 Fe) mg tablet   Yes No   Sig: Take by mouth   furosemide (LASIX) 20 mg tablet   Yes No   Sig: Take by mouth   latanoprost (XALATAN) 0 005 % ophthalmic solution   Yes No   Sig: Administer 1 drop to both eyes daily at bedtime   lisinopril (ZESTRIL) 5 mg tablet   Yes No   Sig: Take by mouth   metoprolol tartrate (LOPRESSOR) 25 mg tablet   Yes No   Sig: Take by mouth   potassium chloride (K-DUR,KLOR-CON) 20 mEq tablet   Yes No   Sig: Take by mouth   warfarin (COUMADIN) 5 mg tablet   Yes No   Sig: Take by mouth      Facility-Administered Medications: None       Past Medical History:   Diagnosis Date   • Amputation of toe (HCC)     3rd toe;  accident   • Arthritis        Past Surgical History:   Procedure Laterality Date   • CATARACT EXTRACTION, BILATERAL     • COLONOSCOPY N/A 11/15/2018    Procedure: COLONOSCOPY;  Surgeon: Fawn Castillo Rosalba Hurd MD;  Location:  MAIN OR;  Service: Gastroenterology   • CORONARY ARTERY BYPASS GRAFT     • ESOPHAGOGASTRODUODENOSCOPY N/A 2018    Procedure: ESOPHAGOGASTRODUODENOSCOPY (EGD); Surgeon: Pina Hoffman MD;  Location:  MAIN OR;  Service: Gastroenterology   • MITRAL VALVE REPLACEMENT         Family History   Problem Relation Age of Onset   • Alzheimer's disease Mother      I have reviewed and agree with the history as documented  E-Cigarette/Vaping   • E-Cigarette Use Never User      E-Cigarette/Vaping Substances   • Nicotine No    • Flavoring No      Social History     Tobacco Use   • Smoking status: Former     Types: Cigarettes     Quit date: 1985     Years since quittin 1   • Smokeless tobacco: Never   Vaping Use   • Vaping Use: Never used   Substance Use Topics   • Alcohol use: Yes     Comment: occasional   • Drug use: No       Review of Systems   Constitutional: Negative for chills and fever  HENT: Positive for congestion, rhinorrhea and sore throat  Eyes: Negative for visual disturbance  Respiratory: Positive for cough  Negative for shortness of breath and wheezing  Cardiovascular: Negative for chest pain, palpitations and leg swelling  Gastrointestinal: Negative for abdominal pain, diarrhea, nausea and vomiting  Genitourinary: Negative for dysuria  Skin: Negative for color change and rash  Neurological: Negative for dizziness, weakness and numbness  Psychiatric/Behavioral: Negative for confusion  All other systems reviewed and are negative  Physical Exam  Physical Exam  Vitals and nursing note reviewed  Constitutional:       General: He is not in acute distress  Appearance: Normal appearance  He is well-developed, well-groomed and normal weight  He is not ill-appearing or diaphoretic  HENT:      Head: Normocephalic and atraumatic        Right Ear: Hearing, tympanic membrane and external ear normal       Left Ear: Hearing, tympanic membrane and external ear normal       Nose: Nose normal       Mouth/Throat:      Mouth: Mucous membranes are moist       Pharynx: Oropharynx is clear  Eyes:      Conjunctiva/sclera: Conjunctivae normal       Pupils: Pupils are equal    Cardiovascular:      Rate and Rhythm: Normal rate and regular rhythm  Heart sounds: Normal heart sounds  Pulmonary:      Effort: Pulmonary effort is normal       Breath sounds: Normal breath sounds  No wheezing, rhonchi or rales  Musculoskeletal:         General: No tenderness  Normal range of motion  Cervical back: Normal range of motion  Right lower leg: No edema  Left lower leg: No edema  Skin:     General: Skin is warm and dry  Coloration: Skin is not jaundiced or pale  Findings: No rash  Neurological:      General: No focal deficit present  Mental Status: He is alert and oriented to person, place, and time  Motor: No weakness  Psychiatric:         Mood and Affect: Mood normal          Behavior: Behavior is cooperative           Vital Signs  ED Triage Vitals   Temperature Pulse Respirations Blood Pressure SpO2   12/24/22 1301 12/24/22 1300 12/24/22 1300 12/24/22 1301 12/24/22 1300   97 8 °F (36 6 °C) 60 18 145/80 96 %      Temp Source Heart Rate Source Patient Position - Orthostatic VS BP Location FiO2 (%)   12/24/22 1301 12/24/22 1300 -- -- --   Temporal Monitor         Pain Score       12/24/22 1300       No Pain           Vitals:    12/24/22 1300 12/24/22 1301   BP:  145/80   Pulse: 60          Visual Acuity      ED Medications  Medications - No data to display    Diagnostic Studies  Results Reviewed     Procedure Component Value Units Date/Time    FLU/RSV/COVID - if FLU/RSV clinically relevant [[id number]]  (Abnormal) Collected: 12/24/22 1303    Lab Status: Final result Specimen: Nares from Nose Updated: 12/24/22 1352     SARS-CoV-2 Positive     INFLUENZA A PCR Negative     INFLUENZA B PCR Negative     RSV PCR Negative    Narrative: FOR PEDIATRIC PATIENTS - copy/paste COVID Guidelines URL to browser: [email or web address]    SARS-CoV-2 assay is a Nucleic Acid Amplification assay intended for the  qualitative detection of nucleic acid from SARS-CoV-2 in nasopharyngeal  swabs  Results are for the presumptive identification of SARS-CoV-2 RNA  Positive results are indicative of infection with SARS-CoV-2, the virus  causing COVID-19, but do not rule out bacterial infection or co-infection  with other viruses  Laboratories within the [place] and its  territories are required to report all positive results to the appropriate  public health authorities  Negative results do not preclude SARS-CoV-2  infection and should not be used as the sole basis for treatment or other  patient management decisions  Negative results must be combined with  clinical observations, patient history, and epidemiological information  This test has not been FDA cleared or approved  This test has been authorized by FDA under an Emergency Use Authorization  (EUA)  This test is only authorized for the duration of time the  declaration that circumstances exist justifying the authorization of the  emergency use of an in vitro diagnostic tests for detection of SARS-CoV-2  virus and/or diagnosis of COVID-19 infection under section 564(b)(1) of  the Act, 21 U  S C  704KGY-5(V)(4), unless the authorization is terminated  or revoked sooner  The test has been validated but independent review by FDA  and CLIA is pending  Test performed using [employer] Gene[employer]pert: This RT-PCR assay targets N2,  a region unique to SARS-CoV-2  A conserved region in the E-gene was chosen  for pan-Sarbecovirus detection which includes SARS-CoV-2  According to CMS-2020-01-R, this platform meets the definition of high-[employer] technology                   XR chest 2 views   ED Interpretation by Ross Escobedo PA-C (12/24 1477)   No acute abnormalities  Procedures  Procedures         ED Course                                             MDM  Number of Diagnoses or Management Options  COVID-19: new and requires workup  Diagnosis management comments: Patient with cough, fevers, runny nose, positive for covid  No acute abnormalities on CXR  Pulse ox stable at 98%, patient well appearing  Paxlovid not prescribed because of drug-drug interactions with his current medications  Patient encouraged to take vitamin D3, 2000 IU daily and f/u with PCP  He was instructed to monitor pulse ox at home and to return to ER if symptoms worsen  Amount and/or Complexity of Data Reviewed  Clinical lab tests: ordered and reviewed  Tests in the radiology section of CPT®: ordered and reviewed  Independent visualization of images, tracings, or specimens: yes        Disposition  Final diagnoses:   COVID-19     Time reflects when diagnosis was documented in both MDM as applicable and the Disposition within this note     Time User Action Codes Description Comment    12/24/2022  2:42 PM Avani Torres [U07 1] COVID-19       ED Disposition     ED Disposition   Discharge    Condition   Stable    Date/Time   Sat Dec 24, 2022  2:42 PM    [address] [facility] discharge to home/self care                 Follow-up Information     Follow up With Specialties Details Why Contact Info    Rehan Dee MD Family Medicine Schedule an appointment as soon as possible for a visit  For recheck [address] [place] [address]  [phone]            Discharge Medication List as of 12/24/2022  2:44 PM      CONTINUE these medications which have NOT CHANGED    Details   atorvastatin (LIPITOR) 10 mg tablet Take by mouth, Starting Tue 7/27/2010, Historical Med      ferrous sulfate 325 (65 Fe) mg tablet Take by mouth, Historical Med      furosemide (LASIX) 20 mg tablet Take by mouth, Starting Tue 7/27/2010, Historical Med      latanoprost (XALATAN) 0 005 % ophthalmic solution Administer 1 drop to both eyes daily at bedtime, Historical Med      lisinopril (ZESTRIL) 5 mg tablet Take by mouth, Starting Thu 5/19/2011, Historical Med      metoprolol tartrate (LOPRESSOR) 25 mg tablet Take by mouth, Starting Tue 1/25/2011, Historical Med      potassium chloride (K-DUR,KLOR-CON) 20 mEq tablet Take by mouth, Starting Tue 7/12/2011, Historical Med      warfarin (COUMADIN) 5 mg tablet Take by mouth, Starting Mon 10/10/2011, Historical Med             No discharge procedures on file      PDMP Review     None          ED Provider  Electronically Signed by           Hiwot Kiser PA-C  12/24/22 0222

## 2022-12-24 NOTE — DISCHARGE INSTRUCTIONS
Rest, increase fluids  Tylenol for fevers  Follow up with family doctor in 3-5 days for recheck  Take vitamin D3 2000 IU daily  Return to ER if symptoms worsen, or pulse ox less than 90%

## 2022-12-25 ENCOUNTER — HOSPITAL ENCOUNTER (INPATIENT)
Facility: HOSPITAL | Age: 87
LOS: 4 days | Discharge: HOME HEALTH | End: 2022-12-29
Attending: EMERGENCY MEDICINE | Admitting: HOSPITALIST

## 2022-12-25 ENCOUNTER — NURSE TRIAGE (OUTPATIENT)
Dept: OTHER | Facility: OTHER | Age: 87
End: 2022-12-25

## 2022-12-25 ENCOUNTER — APPOINTMENT (EMERGENCY)
Dept: RADIOLOGY | Facility: HOSPITAL | Age: 87
End: 2022-12-25

## 2022-12-25 DIAGNOSIS — R09.02 HYPOXIA: ICD-10-CM

## 2022-12-25 DIAGNOSIS — U07.1 COVID-19: Primary | ICD-10-CM

## 2022-12-25 PROBLEM — I48.20 CHRONIC A-FIB (HCC): Status: ACTIVE | Noted: 2022-12-25

## 2022-12-25 LAB
2HR DELTA HS TROPONIN: 12 NG/L
4HR DELTA HS TROPONIN: 28 NG/L
ALBUMIN SERPL BCP-MCNC: 3.7 G/DL (ref 3.5–5)
ALP SERPL-CCNC: 57 U/L (ref 46–116)
ALT SERPL W P-5'-P-CCNC: 22 U/L (ref 12–78)
ANION GAP SERPL CALCULATED.3IONS-SCNC: 5 MMOL/L (ref 4–13)
APTT PPP: 49 SECONDS (ref 23–37)
AST SERPL W P-5'-P-CCNC: 46 U/L (ref 5–45)
BACTERIA UR QL AUTO: ABNORMAL /HPF
BASOPHILS # BLD AUTO: 0.03 THOUSANDS/ÂΜL (ref 0–0.1)
BASOPHILS NFR BLD AUTO: 0 % (ref 0–1)
BILIRUB SERPL-MCNC: 0.8 MG/DL (ref 0.2–1)
BILIRUB UR QL STRIP: NEGATIVE
BUN SERPL-MCNC: 32 MG/DL (ref 5–25)
CALCIUM SERPL-MCNC: 8.9 MG/DL (ref 8.3–10.1)
CARDIAC TROPONIN I PNL SERPL HS: 108 NG/L
CARDIAC TROPONIN I PNL SERPL HS: 124 NG/L
CARDIAC TROPONIN I PNL SERPL HS: 96 NG/L
CHLORIDE SERPL-SCNC: 104 MMOL/L (ref 96–108)
CLARITY UR: CLEAR
CO2 SERPL-SCNC: 29 MMOL/L (ref 21–32)
COLOR UR: YELLOW
CREAT SERPL-MCNC: 1.4 MG/DL (ref 0.6–1.3)
D DIMER PPP FEU-MCNC: 0.84 UG/ML FEU
EOSINOPHIL # BLD AUTO: 0.01 THOUSAND/ÂΜL (ref 0–0.61)
EOSINOPHIL NFR BLD AUTO: 0 % (ref 0–6)
ERYTHROCYTE [DISTWIDTH] IN BLOOD BY AUTOMATED COUNT: 14 % (ref 11.6–15.1)
GFR SERPL CREATININE-BSD FRML MDRD: 44 ML/MIN/1.73SQ M
GLUCOSE SERPL-MCNC: 120 MG/DL (ref 65–140)
GLUCOSE UR STRIP-MCNC: NEGATIVE MG/DL
HCT VFR BLD AUTO: 34.3 % (ref 36.5–49.3)
HGB BLD-MCNC: 10.7 G/DL (ref 12–17)
HGB UR QL STRIP.AUTO: ABNORMAL
IMM GRANULOCYTES # BLD AUTO: 0.04 THOUSAND/UL (ref 0–0.2)
IMM GRANULOCYTES NFR BLD AUTO: 1 % (ref 0–2)
INR PPP: 2.71 (ref 0.84–1.19)
KETONES UR STRIP-MCNC: NEGATIVE MG/DL
LACTATE SERPL-SCNC: 1.3 MMOL/L (ref 0.5–2)
LEUKOCYTE ESTERASE UR QL STRIP: ABNORMAL
LYMPHOCYTES # BLD AUTO: 0.29 THOUSANDS/ÂΜL (ref 0.6–4.47)
LYMPHOCYTES NFR BLD AUTO: 4 % (ref 14–44)
MCH RBC QN AUTO: 28.5 PG (ref 26.8–34.3)
MCHC RBC AUTO-ENTMCNC: 31.2 G/DL (ref 31.4–37.4)
MCV RBC AUTO: 92 FL (ref 82–98)
MONOCYTES # BLD AUTO: 0.89 THOUSAND/ÂΜL (ref 0.17–1.22)
MONOCYTES NFR BLD AUTO: 12 % (ref 4–12)
MUCOUS THREADS UR QL AUTO: ABNORMAL
NEUTROPHILS # BLD AUTO: 6.42 THOUSANDS/ÂΜL (ref 1.85–7.62)
NEUTS SEG NFR BLD AUTO: 83 % (ref 43–75)
NITRITE UR QL STRIP: NEGATIVE
NON-SQ EPI CELLS URNS QL MICRO: ABNORMAL /HPF
NRBC BLD AUTO-RTO: 0 /100 WBCS
PH UR STRIP.AUTO: 5 [PH]
PLATELET # BLD AUTO: 118 THOUSANDS/UL (ref 149–390)
PMV BLD AUTO: 11.7 FL (ref 8.9–12.7)
POTASSIUM SERPL-SCNC: 4.2 MMOL/L (ref 3.5–5.3)
PROCALCITONIN SERPL-MCNC: 0.24 NG/ML
PROT SERPL-MCNC: 7.7 G/DL (ref 6.4–8.4)
PROT UR STRIP-MCNC: ABNORMAL MG/DL
PROTHROMBIN TIME: 30.1 SECONDS (ref 11.6–14.5)
RBC # BLD AUTO: 3.75 MILLION/UL (ref 3.88–5.62)
RBC #/AREA URNS AUTO: ABNORMAL /HPF
SODIUM SERPL-SCNC: 138 MMOL/L (ref 135–147)
SP GR UR STRIP.AUTO: 1.02 (ref 1–1.03)
UROBILINOGEN UR STRIP-ACNC: <2 MG/DL
WBC # BLD AUTO: 7.68 THOUSAND/UL (ref 4.31–10.16)
WBC #/AREA URNS AUTO: ABNORMAL /HPF

## 2022-12-25 PROCEDURE — XW033E5 INTRODUCTION OF REMDESIVIR ANTI-INFECTIVE INTO PERIPHERAL VEIN, PERCUTANEOUS APPROACH, NEW TECHNOLOGY GROUP 5: ICD-10-PCS | Performed by: STUDENT IN AN ORGANIZED HEALTH CARE EDUCATION/TRAINING PROGRAM

## 2022-12-25 PROCEDURE — 8E0ZXY6 ISOLATION: ICD-10-PCS | Performed by: STUDENT IN AN ORGANIZED HEALTH CARE EDUCATION/TRAINING PROGRAM

## 2022-12-25 PROCEDURE — 3E0333Z INTRODUCTION OF ANTI-INFLAMMATORY INTO PERIPHERAL VEIN, PERCUTANEOUS APPROACH: ICD-10-PCS | Performed by: STUDENT IN AN ORGANIZED HEALTH CARE EDUCATION/TRAINING PROGRAM

## 2022-12-25 NOTE — TELEPHONE ENCOUNTER
Patient recently diagnosed with covid  About an hour ago started with severe arm and hand shaking  Has been unable to even hold a cup  No other symptoms noted  Care advice given

## 2022-12-25 NOTE — TELEPHONE ENCOUNTER
Reason for Disposition  • [1] New-onset muscle jerks (twitches, spasms) AND [2] present now    Answer Assessment - Initial Assessment Questions  1  APPEARANCE of MOVEMENT: "What did the jerking or twitching look like?" (e g , body area)      Full arm/hand movements  2  ONSET: "When did this start happening?" (e g , hours, days, weeks, months ago)      About an hour ago  3  DURATION: "How long does the jerk, twitch, or spasm last?"      Constant for the last hour  6  CAUSE: "What do you think caused the shaking?"      unsure  7   OTHER SYMPTOMS: "Are there any other symptoms?" (e g , fever, headache)      denies    Protocols used: MUSCLE JERKS - TICS - SHUDDERS-ADULT-

## 2022-12-25 NOTE — TELEPHONE ENCOUNTER
Regarding: Covid positive/ Hand and arm shaking  ----- Message from Meghna Hill sent at 12/25/2022  4:48 PM EST -----  "My dad tested positive for covid yesterday  Today, his hands and arm is shaking  He developed this an hour ago   No fever "

## 2022-12-26 PROBLEM — U07.1 COVID-19: Status: ACTIVE | Noted: 2022-12-26

## 2022-12-26 PROBLEM — R74.8 ELEVATED CK: Status: ACTIVE | Noted: 2022-12-26

## 2022-12-26 PROBLEM — R06.89 ACUTE RESPIRATORY INSUFFICIENCY: Status: ACTIVE | Noted: 2022-12-26

## 2022-12-26 LAB
ALBUMIN SERPL BCP-MCNC: 3.4 G/DL (ref 3.5–5)
ALP SERPL-CCNC: 52 U/L (ref 46–116)
ALT SERPL W P-5'-P-CCNC: 22 U/L (ref 12–78)
ANION GAP SERPL CALCULATED.3IONS-SCNC: 7 MMOL/L (ref 4–13)
APTT PPP: 174 SECONDS (ref 23–37)
AST SERPL W P-5'-P-CCNC: 53 U/L (ref 5–45)
ATRIAL RATE: 68 BPM
BILIRUB SERPL-MCNC: 0.7 MG/DL (ref 0.2–1)
BUN SERPL-MCNC: 30 MG/DL (ref 5–25)
CALCIUM ALBUM COR SERPL-MCNC: 9.1 MG/DL (ref 8.3–10.1)
CALCIUM SERPL-MCNC: 8.6 MG/DL (ref 8.3–10.1)
CHLORIDE SERPL-SCNC: 105 MMOL/L (ref 96–108)
CK MB SERPL-MCNC: 1.2 NG/ML (ref 0–5)
CK MB SERPL-MCNC: 1.5 NG/ML (ref 0–5)
CK MB SERPL-MCNC: <1 % (ref 0–2.5)
CK MB SERPL-MCNC: <1 % (ref 0–2.5)
CK SERPL-CCNC: 1029 U/L (ref 39–308)
CK SERPL-CCNC: 1696 U/L (ref 39–308)
CO2 SERPL-SCNC: 26 MMOL/L (ref 21–32)
CREAT SERPL-MCNC: 1.25 MG/DL (ref 0.6–1.3)
CRP SERPL QL: 104.5 MG/L
ERYTHROCYTE [DISTWIDTH] IN BLOOD BY AUTOMATED COUNT: 14 % (ref 11.6–15.1)
GFR SERPL CREATININE-BSD FRML MDRD: 51 ML/MIN/1.73SQ M
GLUCOSE SERPL-MCNC: 122 MG/DL (ref 65–140)
HCT VFR BLD AUTO: 31 % (ref 36.5–49.3)
HCT VFR BLD AUTO: 34.1 % (ref 36.5–49.3)
HGB BLD-MCNC: 10.6 G/DL (ref 12–17)
HGB BLD-MCNC: 9.8 G/DL (ref 12–17)
MAGNESIUM SERPL-MCNC: 2.2 MG/DL (ref 1.6–2.6)
MCH RBC QN AUTO: 28.5 PG (ref 26.8–34.3)
MCHC RBC AUTO-ENTMCNC: 31.1 G/DL (ref 31.4–37.4)
MCV RBC AUTO: 92 FL (ref 82–98)
NT-PROBNP SERPL-MCNC: 6728 PG/ML
PHOSPHATE SERPL-MCNC: 3.7 MG/DL (ref 2.3–4.1)
PLATELET # BLD AUTO: 109 THOUSANDS/UL (ref 149–390)
PMV BLD AUTO: 11.1 FL (ref 8.9–12.7)
POTASSIUM SERPL-SCNC: 4 MMOL/L (ref 3.5–5.3)
PROT SERPL-MCNC: 7.4 G/DL (ref 6.4–8.4)
QRS AXIS: -25 DEGREES
QRSD INTERVAL: 114 MS
QT INTERVAL: 366 MS
QTC INTERVAL: 427 MS
RBC # BLD AUTO: 3.72 MILLION/UL (ref 3.88–5.62)
SODIUM SERPL-SCNC: 138 MMOL/L (ref 135–147)
T WAVE AXIS: -63 DEGREES
VENTRICULAR RATE: 82 BPM
WBC # BLD AUTO: 7.44 THOUSAND/UL (ref 4.31–10.16)

## 2022-12-26 RX ORDER — SODIUM CHLORIDE, SODIUM GLUCONATE, SODIUM ACETATE, POTASSIUM CHLORIDE, MAGNESIUM CHLORIDE, SODIUM PHOSPHATE, DIBASIC, AND POTASSIUM PHOSPHATE .53; .5; .37; .037; .03; .012; .00082 G/100ML; G/100ML; G/100ML; G/100ML; G/100ML; G/100ML; G/100ML
75 INJECTION, SOLUTION INTRAVENOUS CONTINUOUS
Status: DISPENSED | OUTPATIENT
Start: 2022-12-26 | End: 2022-12-26

## 2022-12-26 RX ORDER — FERROUS SULFATE 325(65) MG
325 TABLET ORAL
Status: DISCONTINUED | OUTPATIENT
Start: 2022-12-26 | End: 2022-12-29 | Stop reason: HOSPADM

## 2022-12-26 RX ORDER — SENNOSIDES 8.6 MG
1 TABLET ORAL
Status: DISCONTINUED | OUTPATIENT
Start: 2022-12-26 | End: 2022-12-29 | Stop reason: HOSPADM

## 2022-12-26 RX ORDER — ATORVASTATIN CALCIUM 10 MG/1
10 TABLET, FILM COATED ORAL
Status: DISCONTINUED | OUTPATIENT
Start: 2022-12-26 | End: 2022-12-29 | Stop reason: HOSPADM

## 2022-12-26 RX ORDER — HEPARIN SODIUM 1000 [USP'U]/ML
4000 INJECTION, SOLUTION INTRAVENOUS; SUBCUTANEOUS EVERY 6 HOURS PRN
Status: DISCONTINUED | OUTPATIENT
Start: 2022-12-26 | End: 2022-12-26

## 2022-12-26 RX ORDER — HEPARIN SODIUM 10000 [USP'U]/100ML
3-20 INJECTION, SOLUTION INTRAVENOUS
Status: DISCONTINUED | OUTPATIENT
Start: 2022-12-26 | End: 2022-12-26

## 2022-12-26 RX ORDER — DEXAMETHASONE SODIUM PHOSPHATE 4 MG/ML
6 INJECTION, SOLUTION INTRA-ARTICULAR; INTRALESIONAL; INTRAMUSCULAR; INTRAVENOUS; SOFT TISSUE EVERY 24 HOURS
Status: DISCONTINUED | OUTPATIENT
Start: 2022-12-26 | End: 2022-12-27

## 2022-12-26 RX ORDER — ACETAMINOPHEN 325 MG/1
650 TABLET ORAL EVERY 6 HOURS PRN
Status: DISCONTINUED | OUTPATIENT
Start: 2022-12-26 | End: 2022-12-29 | Stop reason: HOSPADM

## 2022-12-26 RX ORDER — HEPARIN SODIUM 1000 [USP'U]/ML
2000 INJECTION, SOLUTION INTRAVENOUS; SUBCUTANEOUS EVERY 6 HOURS PRN
Status: DISCONTINUED | OUTPATIENT
Start: 2022-12-26 | End: 2022-12-26

## 2022-12-26 RX ADMIN — SODIUM CHLORIDE, SODIUM GLUCONATE, SODIUM ACETATE, POTASSIUM CHLORIDE AND MAGNESIUM CHLORIDE 75 ML/HR: 526; 502; 368; 37; 30 INJECTION, SOLUTION INTRAVENOUS at 01:51

## 2022-12-26 RX ADMIN — FERROUS SULFATE TAB 325 MG (65 MG ELEMENTAL FE) 325 MG: 325 (65 FE) TAB at 08:04

## 2022-12-26 RX ADMIN — REMDESIVIR 200 MG: 100 INJECTION, POWDER, LYOPHILIZED, FOR SOLUTION INTRAVENOUS at 01:54

## 2022-12-26 RX ADMIN — METOPROLOL TARTRATE 25 MG: 25 TABLET, FILM COATED ORAL at 22:06

## 2022-12-26 RX ADMIN — HEPARIN SODIUM 12 UNITS/KG/HR: 10000 INJECTION, SOLUTION INTRAVENOUS at 01:59

## 2022-12-26 RX ADMIN — METOPROLOL TARTRATE 25 MG: 25 TABLET, FILM COATED ORAL at 08:03

## 2022-12-26 RX ADMIN — METOPROLOL TARTRATE 25 MG: 25 TABLET, FILM COATED ORAL at 01:58

## 2022-12-26 RX ADMIN — DEXAMETHASONE SODIUM PHOSPHATE 6 MG: 4 INJECTION, SOLUTION INTRAMUSCULAR; INTRAVENOUS at 01:54

## 2022-12-26 RX ADMIN — ATORVASTATIN CALCIUM 10 MG: 10 TABLET, FILM COATED ORAL at 22:06

## 2022-12-26 RX ADMIN — SODIUM CHLORIDE, SODIUM GLUCONATE, SODIUM ACETATE, POTASSIUM CHLORIDE AND MAGNESIUM CHLORIDE 75 ML/HR: 526; 502; 368; 37; 30 INJECTION, SOLUTION INTRAVENOUS at 18:24

## 2022-12-26 RX ADMIN — ATORVASTATIN CALCIUM 10 MG: 10 TABLET, FILM COATED ORAL at 01:58

## 2022-12-26 NOTE — PLAN OF CARE
Problem: MOBILITY - ADULT  Goal: Maintain or return to baseline ADL function  Description: INTERVENTIONS:  -  Assess patient's ability to carry out ADLs; assess patient's baseline for ADL function and identify physical deficits which impact ability to perform ADLs (bathing, care of mouth/teeth, toileting, grooming, dressing, etc )  - Assess/evaluate cause of self-care deficits   - Assess range of motion  - Assess patient's mobility; develop plan if impaired  - Assess patient's need for assistive devices and provide as appropriate  - Encourage maximum independence but intervene and supervise when necessary  - Involve family in performance of ADLs  - Assess for home care needs following discharge   - Consider OT consult to assist with ADL evaluation and planning for discharge  - Provide patient education as appropriate  Outcome: Progressing     Outcome: Progressing

## 2022-12-26 NOTE — ASSESSMENT & PLAN NOTE
· Lopressor 25 mg twice daily, Lasix 5 mg daily, lisinopril 5 mg daily  · Lasix and lisinopril held in setting of increased creatinine

## 2022-12-26 NOTE — ASSESSMENT & PLAN NOTE
· CAD status post CABG x1  · Continue home statin  · Lasix and lisinopril held for now due to slight increase in serum creatinine  · No chest pain  · Discussed with cardiology due to elevated troponins and change in EKG on admission as compared to 2018   Cardiology, Dr Lizy De Jesus, recommends treating fever and BP and trending troponin

## 2022-12-26 NOTE — ASSESSMENT & PLAN NOTE
· Symptoms first onset 12/21  · Vaccinated x4  · Chest x-ray without pneumonia my review final read pending  · On 3 L supplemental oxygen via nasal cannula-mild pathway on admission  Cardiac/Inflammatory markers:  Troponin 6, 108, 124  CK 1029  BNP 6728  CRP 4 5  D-Dimer 2 84  Anticoagulation -normally on Coumadin secondary to A  fib, however as D-dimer is > 0 5 placed on heparin gtt  per COVID protocol  IV dexamethasone day 1  IV remdesivir day 1   trend CBC, CMP  Encourage proning and ambulation

## 2022-12-26 NOTE — ASSESSMENT & PLAN NOTE
· CAD status post CABG x1  · Continue home statin  · Continue to hold Lasix and lisinopril  · No chest pain  · Cardiology recommending to trend troponin and supportive tx

## 2022-12-26 NOTE — ASSESSMENT & PLAN NOTE
Lab Results   Component Value Date    EGFR 44 12/25/2022    EGFR 51 11/01/2022    EGFR 65 09/29/2021    CREATININE 1 40 (H) 12/25/2022    CREATININE 1 24 11/01/2022    CREATININE 1 03 09/29/2021   ·   · Creatinine 1 0-12  · Creatinine on admission 1 40  · Suspect that this is hypovolemic as patient reports decreased p o  intake over the last 48 hours  · Placed on gentle IV fluids overnight and Lasix held  · Once patient is tolerating normal p o  intake and creatinine normalizes within baseline would restart Lasix

## 2022-12-26 NOTE — ED NOTES
Notified attending that the patient's IV site that was previously removed bled through the dressing again  Verbal order to hold heparin  Pressure dressing applied to arm  No bleeding at this time   Watching the site closely      Ricki Cunha RN  12/26/22 [id number]

## 2022-12-26 NOTE — ASSESSMENT & PLAN NOTE
· Symptoms first onset 12/21  · Vaccinated x4  · Chest x-ray Mild groundglass opacity in the left mid lung, question due to Covid 19    · On 3 L supplemental oxygen via nasal cannula-mild pathway on admission  Cardiac/Inflammatory markers:  Troponin 6, 108, 124  CK 1029  BNP 6728  CRP 4 5  D-Dimer 2 84  AC held d/t bleeding  IV dexamethasone and Remdesivir day 1  trend CBC, CMP  Encourage proning and ambulation

## 2022-12-26 NOTE — ASSESSMENT & PLAN NOTE
· SPO2 83% on room air-> Peaked at 4L NC-> Currently on 3 L NC  · Continue oxygen and wean as tolerated

## 2022-12-26 NOTE — ASSESSMENT & PLAN NOTE
· 10 7 on admission  · Baseline ranges between 10-11  · No signs of active bleeding  · Trend CBC daily

## 2022-12-26 NOTE — ED NOTES
Received patient from previous nurse  Bleeding at IV site where heparin was infusing  Heparin moved to the 2nd IV site  1st site was removed and pressure applied  VSS on RA  C/o cough and cp when coughing  IVF running  Linen change  Sitting on side of bed  No complaints of pain at this time  Waiting for inpatient bed  Will place a new IV        Drake De Leon, RN  12/26/22 0901

## 2022-12-26 NOTE — ASSESSMENT & PLAN NOTE
Lab Results   Component Value Date    EGFR 44 12/25/2022    EGFR 51 11/01/2022    EGFR 65 09/29/2021    CREATININE 1 40 (H) 12/25/2022    CREATININE 1 24 11/01/2022    CREATININE 1 03 09/29/2021     · Creatinine 1 0-12  · Creatinine on admission 1 40  · Continue IV fluids  · I's and O's  · Avoid nephrotoxic agents and hypotension  · Continue to trend

## 2022-12-26 NOTE — ASSESSMENT & PLAN NOTE
· SPO2 83% on room air  · Initially placed on 4 L supplemental oxygen via nasal cannula, however weaned down to 3 L supplemental ox via nasal cannula at admission  · Continue oxygen and titrate as able

## 2022-12-26 NOTE — PROGRESS NOTES
[facility] [place]  Progress Note - Lavelle Pedroza 1935, 80 y o  male MRN: [id number]  Unit/Bed#: ED 11 Encounter: [id number]  Primary Care Provider: Sofia Bowers MD   Date and time admitted to hospital: 12/25/2022  6:11 PM    * COVID-19  Assessment & Plan  · Symptoms first onset 12/21  · Vaccinated x4  · Chest x-ray Mild groundglass opacity in the left mid lung, question due to Covid 19    · On 3 L supplemental oxygen via nasal cannula-mild pathway on admission  Cardiac/Inflammatory markers:  Troponin 6, 108, 124  CK 1029  BNP 6728  CRP 4 5  D-Dimer 2 84  AC held d/t bleeding  IV dexamethasone and Remdesivir day 1  trend CBC, CMP  Encourage proning and ambulation     Acute respiratory insufficiency  Assessment & Plan  · SPO2 83% on room air-> Peaked at 4L NC-> Currently on 3 L NC  · Continue oxygen and wean as tolerated    Elevated CK  Assessment & Plan  · CK 1029 admission  · increased  · Continue IV fluids   · Continue to trend    Chronic a-fib (HCC)  Assessment & Plan  · Regimen: Metoprolol 25 mg twice daily and anticoagulation with Coumadin 5 mg daily  · INR goal 2-3 - on Admission 2 71  · AC dc'd d/t bleeding that was not resolving with applied pressure   · Continue metoprolol  · Rate controlled on admission    Stage 3a chronic kidney disease [facility])  Assessment & Plan  Lab Results   Component Value Date    EGFR 44 12/25/2022    EGFR 51 11/01/2022    EGFR 65 09/29/2021    CREATININE 1 40 (H) 12/25/2022    CREATININE 1 24 11/01/2022    CREATININE 1 03 09/29/2021     · Creatinine 1 0-12  · Creatinine on admission 1 40  · Continue IV fluids  · I's and O's  · Avoid nephrotoxic agents and hypotension  · Continue to trend    Essential hypertension  Assessment & Plan  · Lopressor 25 mg twice daily, Lasix 5 mg daily, lisinopril 5 mg daily  · Lasix and lisinopril held in setting of increased creatinine    Arteriosclerotic cardiovascular disease  Assessment & Plan  · CAD status post CABG x1  · Continue home statin  · Continue to hold Lasix and lisinopril  · No chest pain  · Cardiology recommending to trend troponin and supportive tx    Anemia of chronic disease  Assessment & Plan  · 10 7 on admission  · Baseline ranges between 10-11  · No signs of active bleeding  · Trend CBC daily      VTE Pharmacologic Prophylaxis: VTE Score: 6 High Risk (Score >/= 5) - Pharmacological DVT Prophylaxis Contraindicated  Sequential Compression Devices Ordered  Patient Centered Rounds: I performed bedside rounds with nursing staff today  Discussions with Specialists or Other Care Team Provider: CM, PT/OT    Education and Discussions with Family / Patient: Updated  (son) via phone  Time Spent for Care: 30 minutes  More than 50% of total time spent on counseling and coordination of care as described above  Current Length of Stay: 1 day(s)  Current Patient Status: Inpatient   Certification Statement: The patient will continue to require additional inpatient hospital stay due to [place]  Discharge Plan: Anticipate discharge in 48-72 hrs to discharge location to be determined pending rehab evaluations  Code Status: Level 3 - DNAR and DNI    Subjective:   Daja Reynolds and examined at bedside  No acute events overnight  Discussed plan of care  All questions and concerns were answered and addressed  Discontinued heparin drip due to bleeding that was unresolved with applying pressure and compression bandaging  Objective:     Vitals:   Temp (24hrs), Av 4 °F (37 4 °C), Min:97 9 °F (36 6 °C), Max:100 9 °F (38 3 °C)    Temp:  [97 9 °F (36 6 °C)-100 9 °F (38 3 °C)] 97 9 °F (36 6 °C)  HR:  [70-95] 70  Resp:  [15-28] 15  BP: (144-189)/(64-86) 147/67  SpO2:  [83 %-99 %] 95 %  Body mass index is 27 62 kg/m²  Input and Output Summary (last 24 hours):      Intake/Output Summary (Last 24 hours) at 2022 1245  Last data filed at 2022 0716  Gross per 24 hour   Intake --   Output 600 ml   Net -600 ml       Physical Exam:   Physical Exam  Vitals and nursing note reviewed  Constitutional:       Appearance: Normal appearance  He is not ill-appearing  HENT:      Head: Normocephalic and atraumatic  Cardiovascular:      Rate and Rhythm: Normal rate and regular rhythm  Pulses: Normal pulses  Heart sounds: Normal heart sounds  Pulmonary:      Effort: Pulmonary effort is normal       Breath sounds: Normal breath sounds  Comments: 3L NC  Abdominal:      General: Abdomen is flat  Bowel sounds are normal       Palpations: Abdomen is soft  Musculoskeletal:      Right lower leg: No edema  Left lower leg: No edema  Skin:     General: Skin is warm  Neurological:      General: No focal deficit present  Mental Status: He is alert and oriented to person, place, and time  Additional Data:     Labs:  Results from last 7 days   Lab Units 12/26/22  0634 12/25/22  1848   WBC Thousand/uL 7 44 7 68   HEMOGLOBIN g/dL 10 6* 10 7*   HEMATOCRIT % 34 1* 34 3*   PLATELETS Thousands/uL 109* 118*   NEUTROS PCT %  --  83*   LYMPHS PCT %  --  4*   MONOS PCT %  --  12   EOS PCT %  --  0     Results from last 7 days   Lab Units 12/26/22  0634   SODIUM mmol/L 138   POTASSIUM mmol/L 4 0   CHLORIDE mmol/L 105   CO2 mmol/L 26   BUN mg/dL 30*   CREATININE mg/dL 1 25   ANION GAP mmol/L 7   CALCIUM mg/dL 8 6   ALBUMIN g/dL 3 4*   TOTAL BILIRUBIN mg/dL 0 70   ALK PHOS U/L 52   ALT U/L 22   AST U/L 53*   GLUCOSE RANDOM mg/dL 122     Results from last 7 days   Lab Units 12/25/22  1848   INR  2 71*             Results from last 7 days   Lab Units 12/25/22  1848   LACTIC ACID mmol/L 1 3   PROCALCITONIN ng/ml 0 24       Lines/Drains:  Invasive Devices     Peripheral Intravenous Line  Duration           Peripheral IV 12/26/22 Dorsal (posterior); Left Hand <1 day    Peripheral IV 12/26/22 Right Antecubital <1 day                  Telemetry:  Telemetry Orders (From admission, onward)             48 Hour Telemetry Monitoring  Continuous x 48 hours        References:    Telemetry Guidelines   Question:  Reason for 48 Hour Telemetry  Answer:  Acute MI, chest pain - R/O MI, or unstable angina                              Imaging: Reviewed radiology reports from this admission including: chest xray    Recent Cultures (last 7 days):   Results from last 7 days   Lab Units 12/25/22  1848   BLOOD CULTURE  Received in Microbiology Lab  Culture in Progress  Received in Microbiology Lab  Culture in Progress  Last 24 Hours Medication List:   Current Facility-Administered Medications   Medication Dose Route Frequency Provider Last Rate   • acetaminophen  650 mg Oral Q6H PRN Bin Diaz PA-C     • atorvastatin  10 mg Oral Daily With Dinner Bin Diaz PA-C     • dexamethasone  6 mg Intravenous Q24H Bin Diaz PA-C     • ferrous sulfate  325 mg Oral Daily With Breakfast Bin Diaz PA-C     • metoprolol tartrate  25 mg Oral Q12H [address] Bin Diaz PA-C     • multi-electrolyte  75 mL/hr Intravenous Continuous Boyd To MD 75 mL/hr (12/26/22 0151)   • [START ON 12/27/2022] remdesivir  100 mg Intravenous Q24H Bin Diaz PA-C     • senna  1 tablet Oral HS PRN Bin Diaz PA-C          Today, Patient Was Seen By: Boyd To MD    **Please Note: This note may have been constructed using a voice recognition system  **

## 2022-12-26 NOTE — ED PROVIDER NOTES
History  Chief Complaint   Patient presents with   • Weakness - Generalized     Seen in Ed 12/24, dx with COVID  Today, told son he was feeling weak over the phone, so son called EMS  EMS found pt face down on floor, pt states he slid down from his chair, did not fall, but was too weak to get himself back up  Pt denies LOC, head strike, takes coumadin  Upon arrival, pt hypoxic       80year old male diagnosed with COVID yesterday presents for evaluation of continued fatigue  Patient felt weak, slid out of his chair because he was unable to get up  Did not fall  Denies any pain or trauma  Just reports feeling fatigued  O2 sat found to be 86% on arrival  Denies chest pain  Prior to Admission Medications   Prescriptions Last Dose Informant Patient Reported?  Taking?   atorvastatin (LIPITOR) 10 mg tablet 12/24/2022  Yes Yes   Sig: Take by mouth   ferrous sulfate 325 (65 Fe) mg tablet 12/25/2022  Yes Yes   Sig: Take 325 mg by mouth   furosemide (LASIX) 20 mg tablet 12/25/2022  Yes Yes   Sig: Take by mouth   lisinopril (ZESTRIL) 5 mg tablet 12/25/2022  Yes Yes   Sig: Take 5 mg by mouth daily   metoprolol tartrate (LOPRESSOR) 25 mg tablet 12/25/2022  Yes Yes   Sig: Take 25 mg by mouth every 12 (twelve) hours   potassium chloride (K-DUR,KLOR-CON) 20 mEq tablet 12/25/2022  Yes Yes   Sig: Take 20 mEq by mouth daily   warfarin (COUMADIN) 5 mg tablet 12/24/2022  Yes Yes   Sig: Take 5 mg by mouth daily      Facility-Administered Medications: None       Past Medical History:   Diagnosis Date   • Amputation of toe (HCC)     3rd toe;  accident   • Arthritis        Past Surgical History:   Procedure Laterality Date   • CATARACT EXTRACTION, BILATERAL     • COLONOSCOPY N/A 11/15/2018    Procedure: COLONOSCOPY;  Surgeon: Kasia Domignuez MD;  Location:  MAIN OR;  Service: Gastroenterology   • CORONARY ARTERY BYPASS GRAFT     • ESOPHAGOGASTRODUODENOSCOPY N/A 11/14/2018    Procedure: ESOPHAGOGASTRODUODENOSCOPY (EGD); Surgeon: Alma Fonseca MD;  Location: [facility] OR;  Service: Gastroenterology   • MITRAL VALVE REPLACEMENT         Family History   Problem Relation Age of Onset   • Alzheimer's disease Mother      I have reviewed and agree with the history as documented  E-Cigarette/Vaping   • E-Cigarette Use Never User      E-Cigarette/Vaping Substances   • Nicotine No    • Flavoring No      Social History     Tobacco Use   • Smoking status: Former     Types: Cigarettes     Quit date: 1985     Years since quittin 1   • Smokeless tobacco: Never   Vaping Use   • Vaping Use: Never used   Substance Use Topics   • Alcohol use: Yes     Comment: occasional   • Drug use: No       Review of Systems   Constitutional: Positive for fatigue  All other systems reviewed and are negative  Physical Exam  Physical Exam  Vitals and nursing note reviewed  Constitutional:       General: He is not in acute distress  Appearance: He is well-developed  HENT:      Head: Normocephalic and atraumatic  Right Ear: External ear normal       Left Ear: External ear normal       Nose: Nose normal    Eyes:      General: No scleral icterus  Pulmonary:      Effort: Pulmonary effort is normal  No respiratory distress  Comments: Normal WOB  Abdominal:      General: There is no distension  Palpations: Abdomen is soft  Musculoskeletal:         General: No deformity  Normal range of motion  Cervical back: Normal range of motion and neck supple  Skin:     General: Skin is warm  Findings: No rash  Neurological:      General: No focal deficit present  Mental Status: He is alert        Gait: Gait normal    Psychiatric:         Mood and Affect: Mood normal          Vital Signs  ED Triage Vitals [228]   Temperature Pulse Respirations Blood Pressure SpO2   (!) 100 9 °F (38 3 °C) 91 20 (!) 189/83 (!) 83 %      Temp Source Heart Rate Source Patient Position - Orthostatic VS BP Location FiO2 (%)   Oral Monitor Lying Left arm --      Pain Score       No Pain           Vitals:    12/25/22 2030 12/25/22 2200 12/25/22 2230 12/25/22 2300   BP: (!) 181/68 145/64 157/66 150/65   Pulse: 87 88 83 77   Patient Position - Orthostatic VS: Lying Lying Lying Lying         Visual Acuity  Visual Acuity    Flowsheet Row Most Recent Value   L Pupil Size (mm) 3   R Pupil Size (mm) 3          ED Medications  Medications   atorvastatin (LIPITOR) tablet 10 mg (has no administration in time range)   ferrous sulfate tablet 325 mg (has no administration in time range)   metoprolol tartrate (LOPRESSOR) tablet 25 mg (has no administration in time range)   acetaminophen (TYLENOL) tablet 650 mg (has no administration in time range)   senna (SENOKOT) tablet 8 6 mg (has no administration in time range)   dexamethasone (DECADRON) injection 6 mg (has no administration in time range)   remdesivir (Veklury) 200 mg in sodium chloride 0 9 % 290 mL IVPB (has no administration in time range)     Followed by   remdesivir Barbara Beagle) 100 mg in sodium chloride 0 9 % 270 mL IVPB (has no administration in time range)   multi-electrolyte (PLASMALYTE-A/ISOLYTE-S PH 7 4) IV solution (has no administration in time range)   heparin (porcine) 25,000 units in 0 45% NaCl 250 mL infusion (premix) (has no administration in time range)   heparin (porcine) injection 4,000 Units (has no administration in time range)   heparin (porcine) injection 2,000 Units (has no administration in time range)       Diagnostic Studies  Results Reviewed     Procedure Component Value Units Date/Time    APTT [[id number]]     Lab Status: No result Specimen: Blood     CKMB [[id number]]  (Normal) Collected: 12/25/22 1848    Lab Status: Final result Specimen: Blood from Arm, Left Updated: 12/26/22 0010     CK-MB Index <1 0 %      CK-MB 1 5 ng/mL     NT-BNP PRO [[id number]]  (Abnormal) Collected: 12/25/22 1848    Lab Status: Final result Specimen: Blood from Arm, Left Updated: 12/26/22 0008 NT-proBNP 6,728 pg/mL     C-reactive protein [[id number]]  (Abnormal) Collected: 12/25/22 1848    Lab Status: Final result Specimen: Blood from Arm, Left Updated: 12/26/22 0008      5 mg/L     CK (with reflex to MB) [[id number]]  (Abnormal) Collected: 12/25/22 1848    Lab Status: Final result Specimen: Blood from Arm, Left Updated: 12/26/22 0008     Total CK 1,029 U/L     Blood culture #1 [[id number]] Collected: 12/25/22 1848    Lab Status: Preliminary result Specimen: Blood from Arm, Right Updated: 12/26/22 0001     Blood Culture Received in Microbiology Lab  Culture in Progress  Blood culture #2 [[id number]] Collected: 12/25/22 1848    Lab Status: Preliminary result Specimen: Blood from Arm, Left Updated: 12/26/22 0001     Blood Culture Received in Microbiology Lab  Culture in Progress  D-dimer, quantitative [[id number]]  (Abnormal) Collected: 12/25/22 1848    Lab Status: Final result Specimen: Blood from Arm, Left Updated: 12/25/22 2354     D-Dimer, Quant 0 84 ug/ml FEU     Narrative: In the evaluation for possible pulmonary embolism, in the appropriate (Well's Score of 4 or less) patient, the age adjusted d-dimer cutoff for this patient can be calculated as:    Age x 0 01 (in ug/mL) for Age-adjusted D-dimer exclusion threshold for a patient over 50 years      HS Troponin I 4hr [[id number]]  (Abnormal) Collected: 12/25/22 2248    Lab Status: Final result Specimen: Blood from Arm, Left Updated: 12/25/22 2315     hs TnI 4hr 124 ng/L      Delta 4hr hsTnI 28 ng/L     HS Troponin I 2hr [[id number]]  (Abnormal) Collected: 12/25/22 2033    Lab Status: Final result Specimen: Blood from Arm, Left Updated: 12/25/22 2105     hs TnI 2hr 108 ng/L      Delta 2hr hsTnI 12 ng/L     Urine Microscopic [[id number]]  (Abnormal) Collected: 12/25/22 1936    Lab Status: Final result Specimen: Urine, Clean Catch Updated: 12/25/22 2100     RBC, UA 1-2 /hpf      WBC, UA 4-10 /hpf      Epithelial Cells Occasional /hpf Bacteria, UA Occasional /hpf      MUCUS THREADS None Seen    UA w Reflex to Microscopic w Reflex to Culture [[id number]]  (Abnormal) Collected: 12/25/22 1936    Lab Status: Final result Specimen: Urine, Clean Catch Updated: 12/25/22 2100     Color, UA Yellow     Clarity, UA Clear     Specific [place], UA 1 020     pH, UA 5 0     Leukocytes, UA Large     Nitrite, UA Negative     Protein, UA 30 (1+) mg/dl      Glucose, UA Negative mg/dl      Ketones, UA Negative mg/dl      Urobilinogen, UA <2 0 mg/dl      Bilirubin, UA Negative     Occult Blood, UA Large    Comprehensive metabolic panel [[id number]]  (Abnormal) Collected: 12/25/22 1848    Lab Status: Final result Specimen: Blood from Arm, Left Updated: 12/25/22 1939     Sodium 138 mmol/L      Potassium 4 2 mmol/L      Chloride 104 mmol/L      CO2 29 mmol/L      ANION GAP 5 mmol/L      BUN 32 mg/dL      Creatinine 1 40 mg/dL      Glucose 120 mg/dL      Calcium 8 9 mg/dL      AST 46 U/L      ALT 22 U/L      Alkaline Phosphatase 57 U/L      Total Protein 7 7 g/dL      Albumin 3 7 g/dL      Total Bilirubin 0 80 mg/dL      eGFR 44 ml/min/1 73sq m     Narrative:      [place] guidelines for Chronic Kidney Disease (CKD):   •  Stage 1 with normal or high GFR (GFR > 90 mL/min/1 73 square meters)  •  Stage 2 Mild CKD (GFR = 60-89 mL/min/1 73 square meters)  •  Stage 3A Moderate CKD (GFR = 45-59 mL/min/1 73 square meters)  •  Stage 3B Moderate CKD (GFR = 30-44 mL/min/1 73 square meters)  •  Stage 4 Severe CKD (GFR = 15-29 mL/min/1 73 square meters)  •  Stage 5 End Stage CKD (GFR <15 mL/min/1 73 square meters)  Note: GFR calculation is accurate only with a steady state creatinine    Procalcitonin [[id number]]  (Normal) Collected: 12/25/22 1848    Lab Status: Final result Specimen: Blood from Arm, Left Updated: 12/25/22 1933     Procalcitonin 0 24 ng/ml     HS Troponin 0hr (reflex protocol) [[id number]]  (Abnormal) Collected: 12/25/22 1848    Lab Status: Final result Specimen: Blood from Arm, Left Updated: 12/25/22 1932     hs TnI 0hr 96 ng/L     Protime-INR [[id number]]  (Abnormal) Collected: 12/25/22 1848    Lab Status: Final result Specimen: Blood from Arm, Left Updated: 12/25/22 1929     Protime 30 1 seconds      INR 2 71    APTT [[id number]]  (Abnormal) Collected: 12/25/22 1848    Lab Status: Final result Specimen: Blood from Arm, Left Updated: 12/25/22 1929     PTT 49 seconds     Lactic Acid [[id number]]  (Normal) Collected: 12/25/22 1848    Lab Status: Final result Specimen: Blood from Arm, Left Updated: 12/25/22 1927     LACTIC ACID 1 3 mmol/L     Narrative:      Result may be elevated if tourniquet was used during collection  CBC and differential [[id number]]  (Abnormal) Collected: 12/25/22 1848    Lab Status: Final result Specimen: Blood from Arm, Left Updated: 12/25/22 1902     WBC 7 68 Thousand/uL      RBC 3 75 Million/uL      Hemoglobin 10 7 g/dL      Hematocrit 34 3 %      MCV 92 fL      MCH 28 5 pg      MCHC 31 2 g/dL      RDW 14 0 %      MPV 11 7 fL      Platelets 003 Thousands/uL      nRBC 0 /100 WBCs      Neutrophils Relative 83 %      Immat GRANS % 1 %      Lymphocytes Relative 4 %      Monocytes Relative 12 %      Eosinophils Relative 0 %      Basophils Relative 0 %      Neutrophils Absolute 6 42 Thousands/µL      Immature Grans Absolute 0 04 Thousand/uL      Lymphocytes Absolute 0 29 Thousands/µL      Monocytes Absolute 0 89 Thousand/µL      Eosinophils Absolute 0 01 Thousand/µL      Basophils Absolute 0 03 Thousands/µL                  XR chest 1 view portable    (Results Pending)              Procedures  Procedures         ED Course                                             MDM  Number of Diagnoses or Management Options  COVID-19: new and requires workup  Hypoxia: new and requires workup  Diagnosis management comments: 80 yom with COVID presenting hypoxic  Sepsis evaluation, supplemental O2 as needed  Patient will require admission   D/w SLIM  MS [facility]  Amount and/or Complexity of Data Reviewed  Clinical lab tests: reviewed and ordered  Tests in the radiology section of CPT®: reviewed and ordered  Tests in the medicine section of CPT®: ordered and reviewed  Decide to obtain previous medical records or to obtain history from someone other than the patient: yes  Review and summarize past medical records: yes        Disposition  Final diagnoses:   COVID-19   Hypoxia     Time reflects when diagnosis was documented in both MDM as applicable and the Disposition within this note     Time User Action Codes Description Comment    12/25/2022  7:46 PM Kwabena Daily [U07 1] COVID-19     12/25/2022  7:46 PM Charles Almodovar Add [R09 02] Hypoxia       ED Disposition     ED Disposition   Admit    Condition   Stable    Date/Time   Sun Dec 25, 2022  7:46 PM    Comment   Case was discussed with RADHA and the patient's admission status was agreed to be Admission Status: inpatient status to the service of Dr Vincent Pascual   Follow-up Information    None         Patient's Medications   Discharge Prescriptions    No medications on file       No discharge procedures on file      PDMP Review     None          ED Provider  Electronically Signed by           Hodan Richardson DO  12/26/22 0324

## 2022-12-26 NOTE — H&P
[place]  H&P- Lavelle Pedroza 1935, 80 y o  male MRN: [id number]  Unit/Bed#: ED 11 Encounter: [id number]  Primary Care Provider: Sofia Bowers MD   Date and time admitted to hospital: 12/25/2022  6:11 PM    * COVID-19  Assessment & Plan  · Symptoms first onset 12/21  · Vaccinated x4  · Chest x-ray without pneumonia my review final read pending  · On 3 L supplemental oxygen via nasal cannula-mild pathway on admission  Cardiac/Inflammatory markers:  Troponin 6, 108, 124  CK 1029  BNP 6728  CRP 4 5  D-Dimer 2 84  Anticoagulation -normally on Coumadin secondary to A  fib, however as D-dimer is > 0 5 placed on heparin gtt  per COVID protocol  IV dexamethasone day 1  IV remdesivir day 1   trend CBC, CMP  Encourage proning and ambulation     Acute respiratory insufficiency  Assessment & Plan  · SPO2 83% on room air  · Initially placed on 4 L supplemental oxygen via nasal cannula, however weaned down to 3 L supplemental ox via nasal cannula at admission  · Continue oxygen and titrate as able    Elevated CK  Assessment & Plan  · CK 1029 admission  · Suspect that this is secondary to dehydration  · Will place on gentle IV fluids and recheck in morning    Stage 3a chronic kidney disease [facility])  Assessment & Plan  Lab Results   Component Value Date    EGFR 44 12/25/2022    EGFR 51 11/01/2022    EGFR 65 09/29/2021    CREATININE 1 40 (H) 12/25/2022    CREATININE 1 24 11/01/2022    CREATININE 1 03 09/29/2021   ·   · Creatinine 1 0-12  · Creatinine on admission 1 40  · Suspect that this is hypovolemic as patient reports decreased p o  intake over the last 48 hours  · Placed on gentle IV fluids overnight and Lasix held  · Once patient is tolerating normal p o  intake and creatinine normalizes within baseline would restart Lasix    Chronic a-fib (HCC)  Assessment & Plan  · Regimen: Metoprolol 25 mg twice daily and anticoagulation with Coumadin 5 mg daily  · INR goal 2-3 - on Admission 2 71  · Coumadin held as patient placed on heparin GTT per COVID protocol  · Continue metoprolol  · Rate controlled on admission    Anemia  Assessment & Plan  · 10 7 on admission  · Baseline ranges between 10-11  · No signs of active bleeding  · Trend CBC daily    Arteriosclerotic cardiovascular disease  Assessment & Plan  · CAD status post CABG x1  · Continue home statin  · Lasix and lisinopril held for now due to slight increase in serum creatinine  · No chest pain  · Discussed with cardiology due to elevated troponins and change in EKG on admission as compared to 2018  Cardiology, Dr Alfreda Nelson, recommends treating fever and BP and trending troponin     Essential hypertension  Assessment & Plan  · Lopressor 25 mg twice daily, Lasix 5 mg daily, lisinopril 5 mg daily  · Lasix and lisinopril held in setting of increased creatinine      VTE Pharmacologic Prophylaxis: VTE Score: 6 High Risk (Score >/= 5) - Pharmacological DVT Prophylaxis Ordered: heparin drip  Sequential Compression Devices Ordered  Code Status: Level 3 - DNAR and DNI   Discussion with family: Updated  (son) via phone  Anticipated Length of Stay: Patient will be admitted on an inpatient basis with an anticipated length of stay of greater than 2 midnights secondary to COVID-19, acute respiratory sufficiency, elevated CK, CAD status post CABG, HTN, chronic A  fib, and elevated troponin  Total Time for Visit, including Counseling / Coordination of Care: 75 Greater than 50% of this total time spent on direct patient counseling and coordination of care  Chief Complaint: "    History of Present Illness:  Vivian Ortega is a 80 y o  male with a PMH of chronic A  fib on Coumadin, CAD status post CABG, HTN, and CKD stage IIIa who presents with ongoing cough and dyspnea x3 days    He reports he started having congestion 4 nights ago and then "started to feel really bad the following day "  Reports he then developed nausea associated with the cough and then dyspnea  Endorses rhinorrhea  No fever  Denies emesis  Normal bowel movements  Also endorses fatigue and generalized weakness  Denies any chest pain  Patient was seen in the ER early this morning with stable vital signs and was not hypoxic so he was discharged home  Prior to symptom onset he has been in his otherwise normal state of health  Review of Systems:  Review of Systems   Constitutional: Positive for appetite change and fatigue  Negative for chills and fever  HENT: Positive for congestion  Respiratory: Positive for cough and shortness of breath  Cardiovascular: Negative for chest pain, palpitations and leg swelling  Gastrointestinal: Positive for nausea  Negative for abdominal pain, blood in stool, constipation, diarrhea and vomiting  Genitourinary: Negative for difficulty urinating, dysuria and hematuria  Musculoskeletal: Negative for gait problem  Neurological: Positive for weakness (generalized, nonfocal)  All other systems reviewed and are negative  Past Medical and Surgical History:   Past Medical History:   Diagnosis Date   • Amputation of toe ([facility] [address] )     3rd toe;  accident   • Arthritis        Past Surgical History:   Procedure Laterality Date   • CATARACT EXTRACTION, BILATERAL     • COLONOSCOPY N/A 11/15/2018    Procedure: COLONOSCOPY;  Surgeon: Madina Isidro MD;  Location:  MAIN OR;  Service: Gastroenterology   • CORONARY ARTERY BYPASS GRAFT     • ESOPHAGOGASTRODUODENOSCOPY N/A 11/14/2018    Procedure: ESOPHAGOGASTRODUODENOSCOPY (EGD); Surgeon: Erica Owens MD;  Location:  MAIN OR;  Service: Gastroenterology   • MITRAL VALVE REPLACEMENT         Meds/Allergies:  Prior to Admission medications    Medication Sig Start Date End Date Taking?  Authorizing Provider   atorvastatin (LIPITOR) 10 mg tablet Take by mouth 7/27/10  Yes Historical Provider, MD   ferrous sulfate 325 (65 Fe) mg tablet Take 325 mg by mouth   Yes Historical Provider, MD furosemide (LASIX) 20 mg tablet Take by mouth 7/27/10  Yes Historical Provider, MD   lisinopril (ZESTRIL) 5 mg tablet Take 5 mg by mouth daily 11  Yes Historical Provider, MD   metoprolol tartrate (LOPRESSOR) 25 mg tablet Take 25 mg by mouth every 12 (twelve) hours 11  Yes Historical Provider, MD   potassium chloride (K-DUR,KLOR-CON) 20 mEq tablet Take 20 mEq by mouth daily 11  Yes Historical Provider, MD   warfarin (COUMADIN) 5 mg tablet Take 5 mg by mouth daily 10/10/11  Yes Historical Provider, MD     I have reviewed home medications with patient family member  Allergies: Allergies   Allergen Reactions   • Adhesive [Medical Tape] Rash       Social History:  Marital Status: /Civil Union   Occupation: Retired   Patient Pre-hospital Living Situation: Home, Alone son lives in [facility] and is able to stay with patient if needed on discharge   Patient Pre-hospital Level of Mobility: walks  Patient Pre-hospital Diet Restrictions: none   Substance Use History:   Social History     Substance and Sexual Activity   Alcohol Use Yes    Comment: occasional     Social History     Tobacco Use   Smoking Status Former   • Types: Cigarettes   • Quit date: 1985   • Years since quittin 1   Smokeless Tobacco Never     Social History     Substance and Sexual Activity   Drug Use No       Family History:  Family History   Problem Relation Age of Onset   • Alzheimer's disease Mother        Physical Exam:     Vitals:   Blood Pressure: (!) 174/76 (22)  Pulse: 89 (22)  Temperature: (!) 100 9 °F (38 3 °C) (22)  Temp Source: Oral (22)  Respirations: 18 (22)  Weight - Scale: 84 8 kg (187 lb) (22)  SpO2: 95 % (22)    Physical Exam  Vitals and nursing note reviewed  Constitutional:       Appearance: Normal appearance  Comments: Pleasant and conversational     HENT:      Head: Normocephalic        Nose: Nose normal  Mouth/Throat:      Mouth: Mucous membranes are dry  Eyes:      Extraocular Movements: Extraocular movements intact  Conjunctiva/sclera: Conjunctivae normal    Cardiovascular:      Rate and Rhythm: Normal rate and regular rhythm  Pulses: Normal pulses  Pulmonary:      Effort: Pulmonary effort is normal  No respiratory distress  Breath sounds: Normal breath sounds  No wheezing, rhonchi or rales  Comments: On 4L NC with SpO2 at 96% - turned down to 3L on admission with SpO2 stable >90%  Abdominal:      General: Abdomen is flat  Palpations: Abdomen is soft  Tenderness: There is no abdominal tenderness  There is no guarding or rebound  Musculoskeletal:         General: Normal range of motion  Cervical back: Normal range of motion  Right lower leg: No edema  Left lower leg: No edema  Skin:     General: Skin is warm and dry  Coloration: Skin is not pale  Neurological:      General: No focal deficit present  Mental Status: He is alert and oriented to person, place, and time  Psychiatric:         Mood and Affect: Mood normal          Thought Content:  Thought content normal           Additional Data:     Lab Results:  Results from last 7 days   Lab Units 12/25/22  1848   WBC Thousand/uL 7 68   HEMOGLOBIN g/dL 10 7*   HEMATOCRIT % 34 3*   PLATELETS Thousands/uL 118*   NEUTROS PCT % 83*   LYMPHS PCT % 4*   MONOS PCT % 12   EOS PCT % 0     Results from last 7 days   Lab Units 12/25/22  1848   SODIUM mmol/L 138   POTASSIUM mmol/L 4 2   CHLORIDE mmol/L 104   CO2 mmol/L 29   BUN mg/dL 32*   CREATININE mg/dL 1 40*   ANION GAP mmol/L 5   CALCIUM mg/dL 8 9   ALBUMIN g/dL 3 7   TOTAL BILIRUBIN mg/dL 0 80   ALK PHOS U/L 57   ALT U/L 22   AST U/L 46*   GLUCOSE RANDOM mg/dL 120     Results from last 7 days   Lab Units 12/25/22  1848   INR  2 71*             Results from last 7 days   Lab Units 12/25/22  1848   LACTIC ACID mmol/L 1 3   PROCALCITONIN ng/ml 0 24 Lines/Drains:  Invasive Devices     Peripheral Intravenous Line  Duration           Peripheral IV 12/25/22 Left Antecubital <1 day    Peripheral IV 12/26/22 Dorsal (posterior); Left Hand <1 day                    Imaging: Personally reviewed the following imaging: No focal consolidation on chest x-ray  XR chest 1 view portable    (Results Pending)       EKG and Other Studies Reviewed on Admission:   · EKG: A  fib with nonspecific ST changes       ** Please Note: This note has been constructed using a voice recognition system   **

## 2022-12-26 NOTE — ASSESSMENT & PLAN NOTE
· Regimen: Metoprolol 25 mg twice daily and anticoagulation with Coumadin 5 mg daily  · INR goal 2-3 - on Admission 2 71  · Coumadin held as patient placed on heparin GTT per COVID protocol  · Continue metoprolol  · Rate controlled on admission

## 2022-12-27 LAB
ANION GAP SERPL CALCULATED.3IONS-SCNC: 7 MMOL/L (ref 4–13)
BUN SERPL-MCNC: 34 MG/DL (ref 5–25)
CALCIUM SERPL-MCNC: 8.1 MG/DL (ref 8.3–10.1)
CHLORIDE SERPL-SCNC: 105 MMOL/L (ref 96–108)
CK MB SERPL-MCNC: 3 NG/ML (ref 0–5)
CK MB SERPL-MCNC: <1 % (ref 0–2.5)
CK SERPL-CCNC: 1384 U/L (ref 39–308)
CO2 SERPL-SCNC: 24 MMOL/L (ref 21–32)
CREAT SERPL-MCNC: 1.02 MG/DL (ref 0.6–1.3)
ERYTHROCYTE [DISTWIDTH] IN BLOOD BY AUTOMATED COUNT: 13.6 % (ref 11.6–15.1)
GFR SERPL CREATININE-BSD FRML MDRD: 65 ML/MIN/1.73SQ M
GLUCOSE SERPL-MCNC: 125 MG/DL (ref 65–140)
HCT VFR BLD AUTO: 32.9 % (ref 36.5–49.3)
HCT VFR BLD AUTO: 36 % (ref 36.5–49.3)
HCT VFR BLD AUTO: 37.1 % (ref 36.5–49.3)
HGB BLD-MCNC: 10.5 G/DL (ref 12–17)
HGB BLD-MCNC: 11.5 G/DL (ref 12–17)
HGB BLD-MCNC: 11.9 G/DL (ref 12–17)
MAGNESIUM SERPL-MCNC: 2.2 MG/DL (ref 1.6–2.6)
MCH RBC QN AUTO: 28.5 PG (ref 26.8–34.3)
MCHC RBC AUTO-ENTMCNC: 31.9 G/DL (ref 31.4–37.4)
MCV RBC AUTO: 89 FL (ref 82–98)
PLATELET # BLD AUTO: 133 THOUSANDS/UL (ref 149–390)
PMV BLD AUTO: 11.9 FL (ref 8.9–12.7)
POTASSIUM SERPL-SCNC: 4 MMOL/L (ref 3.5–5.3)
RBC # BLD AUTO: 3.68 MILLION/UL (ref 3.88–5.62)
SODIUM SERPL-SCNC: 136 MMOL/L (ref 135–147)
WBC # BLD AUTO: 6.68 THOUSAND/UL (ref 4.31–10.16)

## 2022-12-27 RX ORDER — SACCHAROMYCES BOULARDII 250 MG
250 CAPSULE ORAL 2 TIMES DAILY
Status: DISCONTINUED | OUTPATIENT
Start: 2022-12-27 | End: 2022-12-29 | Stop reason: HOSPADM

## 2022-12-27 RX ORDER — WARFARIN SODIUM 5 MG/1
5 TABLET ORAL
Status: DISCONTINUED | OUTPATIENT
Start: 2022-12-27 | End: 2022-12-29 | Stop reason: HOSPADM

## 2022-12-27 RX ORDER — SODIUM CHLORIDE, SODIUM GLUCONATE, SODIUM ACETATE, POTASSIUM CHLORIDE, MAGNESIUM CHLORIDE, SODIUM PHOSPHATE, DIBASIC, AND POTASSIUM PHOSPHATE .53; .5; .37; .037; .03; .012; .00082 G/100ML; G/100ML; G/100ML; G/100ML; G/100ML; G/100ML; G/100ML
75 INJECTION, SOLUTION INTRAVENOUS CONTINUOUS
Status: DISPENSED | OUTPATIENT
Start: 2022-12-27 | End: 2022-12-27

## 2022-12-27 RX ADMIN — SODIUM CHLORIDE, SODIUM GLUCONATE, SODIUM ACETATE, POTASSIUM CHLORIDE AND MAGNESIUM CHLORIDE 75 ML/HR: 526; 502; 368; 37; 30 INJECTION, SOLUTION INTRAVENOUS at 09:41

## 2022-12-27 RX ADMIN — WARFARIN SODIUM 5 MG: 5 TABLET ORAL at 18:36

## 2022-12-27 RX ADMIN — METOPROLOL TARTRATE 25 MG: 25 TABLET, FILM COATED ORAL at 09:41

## 2022-12-27 RX ADMIN — ATORVASTATIN CALCIUM 10 MG: 10 TABLET, FILM COATED ORAL at 22:26

## 2022-12-27 RX ADMIN — REMDESIVIR 100 MG: 100 INJECTION, POWDER, LYOPHILIZED, FOR SOLUTION INTRAVENOUS at 00:12

## 2022-12-27 RX ADMIN — FERROUS SULFATE TAB 325 MG (65 MG ELEMENTAL FE) 325 MG: 325 (65 FE) TAB at 09:41

## 2022-12-27 RX ADMIN — DEXAMETHASONE SODIUM PHOSPHATE 6 MG: 4 INJECTION, SOLUTION INTRAMUSCULAR; INTRAVENOUS at 00:12

## 2022-12-27 RX ADMIN — METOPROLOL TARTRATE 25 MG: 25 TABLET, FILM COATED ORAL at 22:26

## 2022-12-27 RX ADMIN — SODIUM CHLORIDE, SODIUM GLUCONATE, SODIUM ACETATE, POTASSIUM CHLORIDE AND MAGNESIUM CHLORIDE 75 ML/HR: 526; 502; 368; 37; 30 INJECTION, SOLUTION INTRAVENOUS at 13:19

## 2022-12-27 RX ADMIN — Medication 250 MG: at 18:36

## 2022-12-27 NOTE — PROGRESS NOTES
[place]  Progress Note - Ginette Huynh 1935, 80 y o  male MRN: [id number]  Unit/Bed#: -[facility] Encounter: [id number]  Primary Care Provider: Cassy Cruz MD   Date and time admitted to hospital: 12/25/2022  6:11 PM    * COVID-19  Assessment & Plan  · Symptoms first onset 12/21  · Vaccinated x4  · Chest x-ray Mild groundglass opacity in the left mid lung, question due to Covid 19     · Weaned to RA  Cardiac/Inflammatory markers:  Troponin 6, 108, 124  CK 1029-> peaked at 1696-> continue IVF  BNP 6728  CRP 4 5  D-Dimer 2 84  AC held d/t bleeding-> will resume warfarin this evening  IV dexamethasone and Remdesivir day 2  trend CBC, CMP  Encourage proning and ambulation     Acute respiratory insufficiency  Assessment & Plan  · SPO2 83% on room air-> Peaked at 4L NC-> weaned to RA  · Continue oxygen and wean as tolerated    Elevated CK  Assessment & Plan  · CK 1029 admission-> peaked  · Downward trending  · Continue IV fluids   · Continue to trend    Chronic a-fib (HCC)  Assessment & Plan  · Regimen: Metoprolol 25 mg twice daily and anticoagulation with Coumadin 5 mg daily  · INR goal 2-3 - on Admission 2 71  · Resumed warfarin  · Continue metoprolol  · Rate controlled on admission    Stage 3a chronic kidney disease [facility])  Assessment & Plan  Lab Results   Component Value Date    EGFR 65 12/27/2022    EGFR 51 12/26/2022    EGFR 44 12/25/2022    CREATININE 1 02 12/27/2022    CREATININE 1 25 12/26/2022    CREATININE 1 40 (H) 12/25/2022     · Creatinine 1 0-12  · Creatinine on admission 1 40  · Continue IV fluids  · I's and O's  · Avoid nephrotoxic agents and hypotension  · Continue to trend  · Elevated Cr resolved    Essential hypertension  Assessment & Plan  · Lopressor 25 mg twice daily, Lasix 5 mg daily, lisinopril 5 mg daily  · Lasix and lisinopril held in setting of increased creatinine    Arteriosclerotic cardiovascular disease  Assessment & Plan  · CAD status post CABG x1  · Continue home statin  · Continue to hold Lasix and lisinopril  · No chest pain  · Cardiology recommending to trend troponin and supportive tx    Anemia of chronic disease  Assessment & Plan  · 10 7 on admission  · Baseline ranges between 10-11  · No signs of active bleeding  · Trend CBC daily      VTE Pharmacologic Prophylaxis: VTE Score: 6 High Risk (Score >/= 5) - Pharmacological DVT Prophylaxis Ordered: warfarin (Coumadin)  Sequential Compression Devices Ordered  Patient Centered Rounds: I performed bedside rounds with nursing staff today  Discussions with Specialists or Other Care Team Provider: CM, Pt, OT    Education and Discussions with Family / Patient: Updated  (son) at bedside  Time Spent for Care: 30 minutes  More than 50% of total time spent on counseling and coordination of care as described above  Current Length of Stay: 2 day(s)  Current Patient Status: Inpatient   Certification Statement: The patient will continue to require additional inpatient hospital stay due to elevated CK  Discharge Plan: Anticipate discharge in 48-72 hrs to discharge location to be determined pending rehab evaluations  Code Status: Level 3 - DNAR and DNI    Subjective:   Marya Long and examined at bedside  No acute events overnight  Discussed plan of care  All questions and concerns were answered and addressed  Discontinued heparin drip due to bleeding that was unresolved with applying pressure and compression bandaging  Objective:     Vitals:   Temp (24hrs), Av 8 °F (36 6 °C), Min:97 5 °F (36 4 °C), Max:98 1 °F (36 7 °C)    Temp:  [97 5 °F (36 4 °C)-98 1 °F (36 7 °C)] 97 5 °F (36 4 °C)  HR:  [] 125  Resp:  [18-26] 20  BP: (131-174)/(63-82) 131/70  SpO2:  [93 %-98 %] 95 %  Body mass index is 27 91 kg/m²  Input and Output Summary (last 24 hours):      Intake/Output Summary (Last 24 hours) at 2022 1650  Last data filed at 2022 0645  Gross per 24 hour   Intake 150 ml Output 200 ml   Net -50 ml       Physical Exam:   Physical Exam   Vitals and nursing note reviewed  Constitutional:       Appearance: Normal appearance  He is not ill-appearing  HENT:      Head: Normocephalic and atraumatic  Cardiovascular:      Rate and Rhythm: Normal rate and regular rhythm  Pulses: Normal pulses  Heart sounds: Normal heart sounds  Pulmonary:      Effort: Pulmonary effort is normal       Breath sounds: Normal breath sounds  Comments: on RA  Abdominal:      General: Abdomen is flat  Bowel sounds are normal       Palpations: Abdomen is soft  Musculoskeletal:      Right lower leg: No edema  Left lower leg: No edema  Skin:     General: Skin is warm  Neurological:      General: No focal deficit present  Mental Status: He is alert and oriented to person, place, and time  Additional Data:     Labs:  Results from last 7 days   Lab Units 12/27/22  0921 12/27/22 0338 12/26/22 0634 12/25/22 1848   WBC Thousand/uL  --  6 68   < > 7 68   HEMOGLOBIN g/dL 11 9* 10 5*   < > 10 7*   HEMATOCRIT % 37 1 32 9*   < > 34 3*   PLATELETS Thousands/uL  --  133*   < > 118*   NEUTROS PCT %  --   --   --  83*   LYMPHS PCT %  --   --   --  4*   MONOS PCT %  --   --   --  12   EOS PCT %  --   --   --  0    < > = values in this interval not displayed       Results from last 7 days   Lab Units 12/27/22 0338 12/26/22  0634   SODIUM mmol/L 136 138   POTASSIUM mmol/L 4 0 4 0   CHLORIDE mmol/L 105 105   CO2 mmol/L 24 26   BUN mg/dL 34* 30*   CREATININE mg/dL 1 02 1 25   ANION GAP mmol/L 7 7   CALCIUM mg/dL 8 1* 8 6   ALBUMIN g/dL  --  3 4*   TOTAL BILIRUBIN mg/dL  --  0 70   ALK PHOS U/L  --  52   ALT U/L  --  22   AST U/L  --  53*   GLUCOSE RANDOM mg/dL 125 122     Results from last 7 days   Lab Units 12/25/22  1848   INR  2 71*             Results from last 7 days   Lab Units 12/25/22 1848   LACTIC ACID mmol/L 1 3   PROCALCITONIN ng/ml 0 24       Lines/Drains:  Invasive Devices Peripheral Intravenous Line  Duration           Peripheral IV 12/26/22 Dorsal (posterior); Left Hand 1 day    Peripheral IV 12/26/22 Right Antecubital 1 day                  Telemetry:  Telemetry Orders (From admission, onward)             48 Hour Telemetry Monitoring  Continuous x 48 hours        Expiring   References:    Telemetry Guidelines   Question:  Reason for 48 Hour Telemetry  Answer:  Acute MI, chest pain - R/O MI, or unstable angina                              Imaging: No pertinent imaging reviewed  Recent Cultures (last 7 days):   Results from last 7 days   Lab Units 12/25/22  1848   BLOOD CULTURE  No Growth at 24 hrs  No Growth at 24 hrs  Last 24 Hours Medication List:   Current Facility-Administered Medications   Medication Dose Route Frequency Provider Last Rate   • acetaminophen  650 mg Oral Q6H PRN Alden White PA-C     • atorvastatin  10 mg Oral Daily With Dinner Alden White PA-C     • ferrous sulfate  325 mg Oral Daily With Breakfast Alden White PA-C     • metoprolol tartrate  25 mg Oral Q12H [address] Alden White PA-C     • multi-electrolyte  75 mL/hr Intravenous Continuous Елена Soriano MD 75 mL/hr (12/27/22 1319)   • remdesivir  100 mg Intravenous Q24H Alden White PA-C     • saccharomyces boulardii  250 mg Oral BID Елена Soriano MD     • senna  1 tablet Oral HS PRN Alden White PA-C     • warfarin  5 mg Oral Daily (warfarin) Елена Soriano MD          Today, Patient Was Seen By: Елена Soriano MD    **Please Note: This note may have been constructed using a voice recognition system  **

## 2022-12-27 NOTE — ASSESSMENT & PLAN NOTE
· Symptoms first onset 12/21  · Vaccinated x4  · Chest x-ray Mild groundglass opacity in the left mid lung, question due to Covid 19     · Weaned to RA  Cardiac/Inflammatory markers:  Troponin 6, 108, 124  CK 1029-> peaked at 1696-> continue IVF  BNP 6728  CRP 4 5  D-Dimer 2 84  AC held d/t bleeding-> will resume warfarin this evening  IV dexamethasone and Remdesivir day 2  trend CBC, CMP  Encourage proning and ambulation

## 2022-12-27 NOTE — OCCUPATIONAL THERAPY NOTE
Occupational Therapy Evaluation     Patient Name: Vivian PEARSON Date: 12/27/2022  Problem List  Principal Problem:    COVID-19  Active Problems:    Anemia of chronic disease    Arteriosclerotic cardiovascular disease    Essential hypertension    Stage 3a chronic kidney disease (HCC)    Chronic a-fib (HCC)    Elevated CK    Acute respiratory insufficiency    Past Medical History  Past Medical History:   Diagnosis Date    Amputation of toe ([address] )     3rd toe;  accident    Arthritis      Past Surgical History  Past Surgical History:   Procedure Laterality Date    CATARACT EXTRACTION, BILATERAL      COLONOSCOPY N/A 11/15/2018    Procedure: COLONOSCOPY;  Surgeon: Prema Giordano MD;  Location:  MAIN OR;  Service: Gastroenterology    CORONARY ARTERY BYPASS GRAFT      ESOPHAGOGASTRODUODENOSCOPY N/A 11/14/2018    Procedure: ESOPHAGOGASTRODUODENOSCOPY (EGD); Surgeon: Trenton Cabezas MD;  Location:  MAIN OR;  Service: Gastroenterology    MITRAL VALVE REPLACEMENT           12/27/22 7810   OT Last Visit   OT Visit Date 12/27/22   Note Type   Note type Evaluation   Pain Assessment   Pain Assessment Tool 0-10   Pain Score No Pain   Restrictions/Precautions   Weight Bearing Precautions Per Order No   Other Precautions Contact/isolation; Airborne/isolation; Fall Risk  (+ COVID)   Home Living   Type of [address] [place] [address] One level;Stairs to enter with rails   Bathroom Shower/Tub Walk-in shower   Bathroom Toilet Standard   [address] [place] [address] chair   Home Equipment Walker;Cane   Additional Comments Pt does not use AD at baseline   Prior Function   Level of [place] Independent with ADLs   Lives With Alone   Receives Help From Family   IADLs Independent with driving; Independent with meal prep; Independent with medication management   Lifestyle   Reciprocal Relationships Pt's son lives close by  Subjective   Subjective Pt received sitting on commode     ADL   Eating Assistance 7  Independent Grooming Assistance [place] [address] 5  Supervision/Setup   LB Dressing Assistance 4  Minimal Assistance   Toileting Assistance  4  Minimal Assistance   Bed Mobility   Additional Comments Pt received OOB   Transfers   Sit to Stand 5  Supervision   Additional items Verbal cues   Stand to Sit 5  Supervision   Additional items Verbal cues   Stand pivot 5  Supervision   Additional items Verbal cues; Increased time required  (RW)   Toilet transfer 5  Supervision   Additional items Commode;Verbal cues  (RW)   Functional Mobility   Functional Mobility 5  Supervision   Additional Comments RW   Balance   Static Sitting Normal   Dynamic Sitting Good   Static Standing Fair +   Dynamic Standing Fair   Activity Tolerance   Activity Tolerance Patient tolerated treatment well   Medical Staff Made Aware PT Carol   RUE Assessment   RUE Assessment WFL   LUE Assessment   LUE Assessment WFL   Cognition   Overall Cognitive Status WFL   Arousal/Participation Alert   Attention Within functional limits   Orientation Level Oriented X4   Memory Within functional limits   Following Commands Follows all commands and directions without difficulty   Assessment   Limitation Decreased ADL status; Decreased self-care trans;Decreased high-level ADLs   Prognosis Good   Assessment Pt is a 80 y o  male seen for OT evaluation at [facility], admitted 12/25/2022 w/ COVID-19  Comorbidities affecting pt's functional performance at time of assessment include: anxiety, essential HTN, stage 3 CKD, etc (see chart)  Personal factors affecting pt at time of IE include:limited home support, difficulty performing ADLS and difficulty performing IADLS   Prior to admission, pt was living alone in a house with some steps to manage  Pt was I w/  ADLS and IADLS, (+) drove, & required no DME/AD PTA   Upon evaluation: Pt requires  sup for functional mobility/transfers, sup for UB ADLs and sup-min A for LB ADLS 2* the following deficits impacting occupational performance: weakness and decreased balance  Full objective findings from OT assessment regarding body systems outlined above  These impairments, as well as pt's decreased caregiver support and risk for falls  limit pt's ability to safely engage in all baseline areas of occupation and mobility  Pt to benefit from continued skilled OT tx while in the hospital to address deficits as defined above and maximize level of functional independence w ADL's and functional mobility  Occupational Performance areas to address include: bathing/shower, toilet hygiene, dressing and functional mobility  This evaluation required an extensive review of medical and/or therapy records and additional review of physical, cognitive and psychosocial history related to functional performance  Based upon functional performance deficits and assessments, this evaluation has been identified as a high complexity evaluation  The patient's raw score on the AM-PAC Daily Activity inpatient short form is 21  , standardized score is 44 27  , greater than 39 4  Patients at this level are likely to benefit from DC to home, which DOES coincide with CURRENT above OT recommendations  However please refer to therapist recommendation for discharge planning given other factors that may influence destination  At this time, OT recommendations at time of discharge are home OT  Goals   Patient Goals Pt wishes to get home   Short Term Goal #1 Pt will achieve the following goals within 10 days          *Pt will complete UB bathing and dressing with mod I         *Pt will complete LB bathing and dressing with mod I          * Pt will complete toileting w/ mod I w/ G hygiene/thoroughness using DME PRN        *Pt will complete bed mobility with mod I, with bed flat and no side rail to prep for purposeful tasks        *Pt will perform functional transfers with on/off all surfaces with mod I using DME as needed w/ G balance/safety  *Pt will increase standing tolerance to 5 minutes in order to complete sinkside ADL task  *Pt will improve functional mobility during ADL/IADL/leisure tasks to mod I using DME as needed w/ G balance/safety  Plan   Treatment Interventions ADL retraining;Functional transfer training;Patient/family training; Endurance training; Compensatory technique education   Goal Expiration Date 01/06/23   OT Treatment Day 0   OT Frequency 2-3x/wk   Recommendation   OT Discharge Recommendation Home with home health rehabilitation   AM-PAC Daily Activity Inpatient   Lower Body Dressing 3   Bathing 3   Toileting 3   Upper Body Dressing 4   Grooming 4   Eating 4   Daily Activity Raw Score 21   Daily Activity Standardized Score (Calc for Raw Score >=11) 44 27   AM-PAC Applied Cognition Inpatient   Following a Speech/Presentation 4   Understanding Ordinary Conversation 4   Taking Medications 4   Remembering Where Things Are Placed or Put Away 4   Remembering List of 4-5 Errands 4   Taking Care of Complicated Tasks 4   Applied Cognition Raw Score 24   Applied Cognition Standardized Score 62 21         Mary Rossi OTR/L

## 2022-12-27 NOTE — PHYSICAL THERAPY NOTE
PHYSICAL THERAPY Evaluation    Performed at least 2 patient identifiers during session:  Patient Active Problem List   Diagnosis    Anemia of chronic disease    Anxiety    Arteriosclerotic cardiovascular disease    Arthritis    Herpes zoster    Hyperglycemia    Mixed hyperlipidemia    Essential hypertension    Hypocalcemia    Insomnia    GI bleed    Coagulopathy (HCC)    Paroxysmal atrial fibrillation (HCC)    Primary osteoarthritis of both hips    Trochanteric bursitis of both hips    Depression, recurrent (HCC)    Stage 3a chronic kidney disease (HCC)    Chronic a-fib (HCC)    COVID-19    Elevated CK    Acute respiratory insufficiency       Past Medical History:   Diagnosis Date    Amputation of toe ([address] )     3rd toe;  accident    Arthritis        Past Surgical History:   Procedure Laterality Date    CATARACT EXTRACTION, BILATERAL      COLONOSCOPY N/A 11/15/2018    Procedure: COLONOSCOPY;  Surgeon: Mitch Riley MD;  Location: [facility];  Service: Gastroenterology    CORONARY ARTERY BYPASS GRAFT      ESOPHAGOGASTRODUODENOSCOPY N/A 11/14/2018    Procedure: ESOPHAGOGASTRODUODENOSCOPY (EGD); Surgeon: Finn Wheeler MD;  Location:  MAIN OR;  Service: Gastroenterology    MITRAL VALVE REPLACEMENT          12/27/22 1448   PT Last Visit   PT Visit Date 12/27/22   Note Type   Note type Evaluation   Pain Assessment   Pain Assessment Tool 0-10   Pain Score No Pain   Restrictions/Precautions   Weight Bearing Precautions Per Order No   Other Precautions Contact/isolation; Airborne/isolation;Droplet precautions; Chair Alarm; Bed Alarm  (COVID)   Home Living   Type of [address] [place] [address] One level  (2 YANETH)   Bathroom Shower/Tub Walk-in shower   [address] [place] [address] chair   Home Equipment Walker;Cane   Additional Comments pt does not use AD at baseline   Prior Function   Level of [place] Independent with ADLs; Independent with functional mobility; Independent with IADLS   Lives With Alone   Receives Help From Family   IADLs Independent with driving; Independent with meal prep; Independent with medication management   General   Family/Caregiver Present No   Cognition   Overall Cognitive Status WFL   Arousal/Participation Alert   Orientation Level Oriented X4   Memory Within functional limits   Following Commands Follows all commands and directions without difficulty   RUE Assessment   RUE Assessment WFL   LUE Assessment   LUE Assessment WFL   RLE Assessment   RLE Assessment WFL   LLE Assessment   LLE Assessment WFL   Coordination   Movements are Fluid and Coordinated 1   Bed Mobility   Additional Comments pt seated on commode at start of session, remains seated in chair at bedside at end of session, chair alarm intact,   Transfers   Sit to Stand 5  Supervision   Additional items Assist x 1; Increased time required;Verbal cues  (RW)   Stand to Sit 5  Supervision   Additional items Armrests   Stand pivot 5  Supervision   Additional items Increased time required   Toilet transfer 5  Supervision   Additional items Assist x 1;Commode  (pt able to perform hygiene without assistance after voiding urine and passed bowels)   Ambulation/Elevation   Gait pattern Decreased foot clearance; Forward Flexion   Gait Assistance 5  Supervision   Additional items Assist x 1   Assistive Device Rolling walker   Distance 50ft with RW, no LOB, slow nickolas  Balance   Static Sitting Normal   Dynamic Sitting Good   Static Standing Fair   Dynamic Standing Fair   Ambulatory Fair -   Activity Tolerance   Activity Tolerance   (no adverse effects to PT noted)   Medical Staff Made Aware OT Rina   Assessment   Prognosis Fair   Problem List Decreased endurance; Impaired balance;Decreased mobility   Assessment Pt is an 80 y o  male who presented to ED 12/25/22 with c/o fatigue, felt weak, slid out of chair  Dx:  COVID, hypoxia   Comorbidities affecting pt's physical performance at time of assessment include: 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), arthritis  Personal factors affecting pt at time of IE include: CP for COVID, fatigue  PLOF and home set up listed above; Upon evaluation: Pt requires S for sit to stand, and S for ambulation with RW  Full objective findings from PT assessment regarding body systems outlined above  Current limitations include impaired balance, decreased endurance/activity tolerance, gait deviations, need for AD  The following objective measures performed on IE also reveal limitations: SpO2 >90% throughout mobility on room air  Pt's clinical presentation is currently unstable/unpredictable seen in pt's presentation of continuous monitoring in hospital  Pt would benefit from continued PT while in hospital and follow up with HHCPT at D/C to increase strength, balance, endurance, independence with funcitonal mobility to return to PLOF, maximize independence, decrease caregiver burden and improve quality of life  The patient's AM-PAC Basic Mobility Inpatient Short Form Raw Score is 22  A Raw score of greater than 17 suggests the patient may benefit from discharge to home  Please also refer to the recommendation of the Physical Therapist for safe discharge planning  Barriers to Discharge Decreased caregiver support   Goals   Patient Goals To get better and go home   STG Expiration Date 01/10/23   Short Term Goal #1 Pt will be able to demo:  mod I sit to/from supine, mod I sit to/from standing without AD, mod I to ambulate 150ft without AD, mod I to ascend/descend 2 steps with handrail; to return home at PLOF, increase endurance and mobility independence, improve quality of life  Plan   Treatment/Interventions Functional transfer training;LE strengthening/ROM; Elevations; Therapeutic exercise; Endurance training;Patient/family training;Equipment eval/education; Bed mobility;Gait training; Compensatory technique education;Continued evaluation;Spoke to nursing;OT   PT Frequency 1-2x/wk   Recommendation   PT Discharge Recommendation Home with home health rehabilitation  (use of RW for all mobility)   Equipment Recommended [place] walker   [address] Mobility Inpatient   Turning in Bed Without Bedrails 4   Lying on Back to Sitting on Edge of Flat Bed 4   Moving Bed to Chair 4   Standing Up From Chair 4   Walk in Room 3   Climb 3-5 Stairs 3   Basic Mobility Inpatient Raw Score 22   Basic Mobility Standardized Score 47 4   Highest Level Of Mobility   JH-HLM Goal 7: Walk 25 feet or more   JH-HLM Achieved 7: Walk 25 feet or more   Floresita Chávez      Patient Name: Vivian Ortega  [id number] Date: 12/27/2022

## 2022-12-27 NOTE — ASSESSMENT & PLAN NOTE
· Regimen: Metoprolol 25 mg twice daily and anticoagulation with Coumadin 5 mg daily  · INR goal 2-3 - on Admission 2 71  · Resumed warfarin  · Continue metoprolol  · Rate controlled on admission

## 2022-12-27 NOTE — PLAN OF CARE
Problem: OCCUPATIONAL THERAPY ADULT  Goal: Performs self-care activities at highest level of function for planned discharge setting  See evaluation for individualized goals  Description: Treatment Interventions: ADL retraining, Functional transfer training, Patient/family training, Endurance training, Compensatory technique education          See flowsheet documentation for full assessment, interventions and recommendations  Note: Limitation: Decreased ADL status, Decreased self-care trans, Decreased high-level ADLs  Prognosis: Good  Assessment: Pt is a 80 y o  male seen for OT evaluation at [facility], admitted 12/25/2022 w/ COVID-19  Comorbidities affecting pt's functional performance at time of assessment include: anxiety, essential HTN, stage 3 CKD, etc (see chart)  Personal factors affecting pt at time of IE include:limited home support, difficulty performing ADLS and difficulty performing IADLS   Prior to admission, pt was living alone in a house with some steps to manage  Pt was I w/  ADLS and IADLS, (+) drove, & required no DME/AD PTA  Upon evaluation: Pt requires  sup for functional mobility/transfers, sup for UB ADLs and sup-min A for LB ADLS 2* the following deficits impacting occupational performance: weakness and decreased balance  Full objective findings from OT assessment regarding body systems outlined above  These impairments, as well as pt's decreased caregiver support and risk for falls  limit pt's ability to safely engage in all baseline areas of occupation and mobility  Pt to benefit from continued skilled OT tx while in the hospital to address deficits as defined above and maximize level of functional independence w ADL's and functional mobility  Occupational Performance areas to address include: bathing/shower, toilet hygiene, dressing and functional mobility    This evaluation required an extensive review of medical and/or therapy records and additional review of physical, cognitive and psychosocial history related to functional performance  Based upon functional performance deficits and assessments, this evaluation has been identified as a high complexity evaluation  The patient's raw score on the AM-PAC Daily Activity inpatient short form is 21  , standardized score is 44 27  , greater than 39 4  Patients at this level are likely to benefit from DC to home, which DOES coincide with CURRENT above OT recommendations  However please refer to therapist recommendation for discharge planning given other factors that may influence destination  At this time, OT recommendations at time of discharge are home OT       OT Discharge Recommendation: Home with home health rehabilitation

## 2022-12-27 NOTE — ASSESSMENT & PLAN NOTE
Lab Results   Component Value Date    EGFR 65 12/27/2022    EGFR 51 12/26/2022    EGFR 44 12/25/2022    CREATININE 1 02 12/27/2022    CREATININE 1 25 12/26/2022    CREATININE 1 40 (H) 12/25/2022     · Creatinine 1 0-12  · Creatinine on admission 1 40  · Continue IV fluids  · I's and O's  · Avoid nephrotoxic agents and hypotension  · Continue to trend  · Elevated Cr resolved

## 2022-12-27 NOTE — CASE MANAGEMENT
Case Management Assessment & Discharge Planning Note    Patient name Grayson Koo  Location /-01 MRN [id number]  : 1935 Date 2022       Current Admission Date: 2022  Current Admission Diagnosis:COVID-19   Patient Active Problem List    Diagnosis Date Noted   • COVID-19 2022   • Elevated CK 2022   • Acute respiratory insufficiency 2022   • Chronic a-fib ([facility] ) 2022   • Depression, recurrent ([facility] ) 2022   • Stage 3a chronic kidney disease ([facility] ) 2022   • Primary osteoarthritis of both hips 2021   • Trochanteric bursitis of both hips 2021   • Paroxysmal atrial fibrillation ([facility] ) 2018   • GI bleed 2018   • Coagulopathy ([facility] ) 2018   • Hyperglycemia 2016   • Hypocalcemia 2016   • Herpes zoster 2015   • Insomnia 2013   • Anxiety 2012   • Arthritis 2012   • Anemia of chronic disease 2012   • Arteriosclerotic cardiovascular disease 2012   • Mixed hyperlipidemia 2012   • Essential hypertension 2012      LOS (days): 2  Geometric Mean LOS (GMLOS) (days):   Days to GMLOS:     OBJECTIVE:    Risk of Unplanned Readmission Score: 17 94         Current admission status: Inpatient  Referral Reason: VNA    Preferred Pharmacy:   [address] [place] [address], [address]  Phone: [phone] Fax: [address] [place] [address]  Phone: [phone] Fax: [phone]    Primary Care Provider: Mariela Pride MD    Primary Insurance: MEDICARE  Secondary Insurance: Lu Pitts 20:  [address]   Primary Phone: [phone] (Mobile)  Home Phone: [phone]               Advance Directives  Does patient have a [address] [place] [address]?: Yes  Does patient have Advance Directives?: Yes  Advance Directives: Living will, Power of  for health care  Primary Contact: Aide Turner         Readmission Root Cause  30 Day Readmission: No    Patient Information  Admitted from[de-identified] Home  Mental Status: Alert  During Assessment patient was accompanied by: Not accompanied during assessment  Assessment information provided by[de-identified] Son  Primary Caregiver: Self  Support Systems: Self, [address] of Residence: [address] [place] [address] do you live in?: [address] [facility] entry access options   Select all that apply : Stairs  Number of steps to enter home : 2  Do the steps have railings?: No  Type of Current Residence: [place]  In the last 12 months, was there a time when you were not able to pay the mortgage or rent on time?: No  In the last 12 months, how many places have you lived?: 1  In the last 12 months, was there a time when you did not have a steady place to sleep or slept in a shelter (including now)?: No  Homeless/housing insecurity resource given?: N/A  Living Arrangements: Lives Alone  Is patient a ?: No    Activities of Daily Living Prior to Admission  Functional Status: Independent  Completes ADLs independently?: Yes  Ambulates independently?: Yes  Does patient use assisted devices?: No  Does patient currently own DME?: Yes  What DME does the patient currently own?: Nuzhat Rojo  Does patient have a history of Outpatient Therapy (PT/OT)?: No  Does the patient have a history of Short-Term Rehab?: No  Does patient have a history of HHC?: No  Does patient currently have [facility]?: No         Patient Information Continued  Income Source: Pension/senior care  Does patient have prescription coverage?: Yes  Within the past 12 months, you worried that your food would run out before you got the money to buy more : Never true  Within the past 12 months, the food you bought just didn't last and you didn't have money to get more : Never true  Food insecurity resource given?: N/A  Does patient receive dialysis treatments?: No  Does patient have a history of substance abuse?: No  Does patient have a history of Mental Health Diagnosis?: No         Means of Transportation  Means of Transport to Appts[de-identified] Drives Self  In the past 12 months, has lack of transportation kept you from medical appointments or from getting medications?: No  In the past 12 months, has lack of transportation kept you from meetings, work, or from getting things needed for daily living?: No  Was application for public transport provided?: N/A        DISCHARGE DETAILS:    Discharge planning discussed with[de-identified] Amador Chau as pt is COVID +  Freedom of Choice: Yes  Comments - Freedom of Choice: discussed dc needs  Homecare vs rehab pending recommendations  CM contacted family/caregiver?: Yes  Were Treatment Team discharge recommendations reviewed with patient/caregiver?: Yes  Did patient/caregiver verbalize understanding of patient care needs?: Yes       Contacts  Patient Contacts: Carmel Obrien  Relationship to Patient[de-identified] Family  Contact Method: Phone  Phone Number: [phone]  Reason/Outcome: Discharge [address]         Is the patient interested in [address] at discharge?: No (pendign recommendations)    DME Referral Provided  Referral made for DME?: No    Other Referral/Resources/Interventions Provided:  Interventions: HHC  Referral Comments: Pt to be seen by PT/OT  Cm spoke with pts son prior to PT OT assessment  Will follow up on recs tomorrow         [facility]: [facility]  Discharge Destination Plan[de-identified] Home with Chrisd at Discharge : Family                                      Additional Comments: Cm spoke with pts amador Chau  He reports pt lives alone in a [facility] style home with 2 ronal  PT does not use dme at home  He zacarias have a walker and cane  PT has no hx of HHC/STR/PT/OT  Pts amador Chau is POA  Pt is essentially inpt at home   He does somewhat drive  He does not drive far in general  Only bank and grocery store   Pt uses [facility] and sees  PCP

## 2022-12-27 NOTE — PLAN OF CARE
Problem: PHYSICAL THERAPY ADULT  Goal: Performs mobility at highest level of function for planned discharge setting  See evaluation for individualized goals  Description: Treatment/Interventions: Functional transfer training, LE strengthening/ROM, Elevations, Therapeutic exercise, Endurance training, Patient/family training, Equipment eval/education, Bed mobility, Gait training, Compensatory technique education, Continued evaluation, Spoke to nursing, OT  Equipment Recommended: Robbi Crowley       See flowsheet documentation for full assessment, interventions and recommendations  Note: Prognosis: Fair  Problem List: Decreased endurance, Impaired balance, Decreased mobility  Assessment: Pt is an 80 y o  male who presented to ED 12/25/22 with c/o fatigue, felt weak, slid out of chair  Dx:  COVID, hypoxia  Comorbidities affecting pt's physical performance at time of assessment include: 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), arthritis  Personal factors affecting pt at time of IE include: CP for COVID, fatigue  PLOF and home set up listed above; Upon evaluation: Pt requires S for sit to stand, and S for ambulation with RW  Full objective findings from PT assessment regarding body systems outlined above  Current limitations include impaired balance, decreased endurance/activity tolerance, gait deviations, need for AD  The following objective measures performed on IE also reveal limitations: SpO2 >90% throughout mobility on room air  Pt's clinical presentation is currently unstable/unpredictable seen in pt's presentation of continuous monitoring in hospital  Pt would benefit from continued PT while in hospital and follow up with HHCPT at D/C to increase strength, balance, endurance, independence with funcitonal mobility to return to PLOF, maximize independence, decrease caregiver burden and improve quality of life  The patient's AM-PAC Basic Mobility Inpatient Short Form Raw Score is 22   A Raw score of greater than 17 suggests the patient may benefit from discharge to home  Please also refer to the recommendation of the Physical Therapist for safe discharge planning  Barriers to Discharge: Decreased caregiver support     PT Discharge Recommendation: Home with home health rehabilitation (use of RW for all mobility)    See flowsheet documentation for full assessment

## 2022-12-27 NOTE — PLAN OF CARE
Problem: Potential for Falls  Goal: Patient will remain free of falls  Description: INTERVENTIONS:  - Educate patient/family on patient safety including physical limitations  - Instruct patient to call for assistance with activity   - Consult OT/PT to assist with strengthening/mobility   - Keep Call bell within reach  - Keep bed low and locked with side rails adjusted as appropriate  - Keep care items and personal belongings within reach  - Initiate and maintain comfort rounds  - Make Fall Risk Sign visible to staff  - Offer Toileting every 2  Hours, in advance of need  - Initiate/Maintain bed and chair alarm  - Obtain necessary fall risk management equipment:   - Apply yellow socks and bracelet for high fall risk patients  - Consider moving patient to room near nurses station  Outcome: Progressing

## 2022-12-28 PROBLEM — R06.89 ACUTE RESPIRATORY INSUFFICIENCY: Status: RESOLVED | Noted: 2022-12-26 | Resolved: 2022-12-28

## 2022-12-28 LAB
ALBUMIN SERPL BCP-MCNC: 2.8 G/DL (ref 3.5–5)
ALP SERPL-CCNC: 44 U/L (ref 46–116)
ALT SERPL W P-5'-P-CCNC: 27 U/L (ref 12–78)
ANION GAP SERPL CALCULATED.3IONS-SCNC: 7 MMOL/L (ref 4–13)
AST SERPL W P-5'-P-CCNC: 49 U/L (ref 5–45)
BILIRUB SERPL-MCNC: 0.4 MG/DL (ref 0.2–1)
BUN SERPL-MCNC: 31 MG/DL (ref 5–25)
CALCIUM ALBUM COR SERPL-MCNC: 8.6 MG/DL (ref 8.3–10.1)
CALCIUM SERPL-MCNC: 7.6 MG/DL (ref 8.3–10.1)
CHLORIDE SERPL-SCNC: 107 MMOL/L (ref 96–108)
CK MB SERPL-MCNC: 2.6 NG/ML (ref 0–5)
CK MB SERPL-MCNC: 2.8 NG/ML (ref 0–5)
CK MB SERPL-MCNC: <1 % (ref 0–2.5)
CK MB SERPL-MCNC: <1 % (ref 0–2.5)
CK SERPL-CCNC: 564 U/L (ref 39–308)
CK SERPL-CCNC: 736 U/L (ref 39–308)
CO2 SERPL-SCNC: 27 MMOL/L (ref 21–32)
CREAT SERPL-MCNC: 1.01 MG/DL (ref 0.6–1.3)
ERYTHROCYTE [DISTWIDTH] IN BLOOD BY AUTOMATED COUNT: 13.6 % (ref 11.6–15.1)
FERRITIN SERPL-MCNC: 186 NG/ML (ref 8–388)
FOLATE SERPL-MCNC: >20 NG/ML (ref 3.1–17.5)
GFR SERPL CREATININE-BSD FRML MDRD: 66 ML/MIN/1.73SQ M
GLUCOSE SERPL-MCNC: 117 MG/DL (ref 65–140)
HCT VFR BLD AUTO: 34.1 % (ref 36.5–49.3)
HGB BLD-MCNC: 10.6 G/DL (ref 12–17)
INR PPP: 2.16 (ref 0.84–1.19)
IRON SATN MFR SERPL: 33 % (ref 20–50)
IRON SERPL-MCNC: 83 UG/DL (ref 65–175)
MAGNESIUM SERPL-MCNC: 2.4 MG/DL (ref 1.6–2.6)
MCH RBC QN AUTO: 27.8 PG (ref 26.8–34.3)
MCHC RBC AUTO-ENTMCNC: 31.1 G/DL (ref 31.4–37.4)
MCV RBC AUTO: 90 FL (ref 82–98)
PLATELET # BLD AUTO: 141 THOUSANDS/UL (ref 149–390)
PMV BLD AUTO: 11.6 FL (ref 8.9–12.7)
POTASSIUM SERPL-SCNC: 3.6 MMOL/L (ref 3.5–5.3)
PROT SERPL-MCNC: 6.3 G/DL (ref 6.4–8.4)
PROTHROMBIN TIME: 25.3 SECONDS (ref 11.6–14.5)
RBC # BLD AUTO: 3.81 MILLION/UL (ref 3.88–5.62)
SODIUM SERPL-SCNC: 141 MMOL/L (ref 135–147)
TIBC SERPL-MCNC: 255 UG/DL (ref 250–450)
WBC # BLD AUTO: 8.88 THOUSAND/UL (ref 4.31–10.16)

## 2022-12-28 RX ORDER — SODIUM CHLORIDE, SODIUM GLUCONATE, SODIUM ACETATE, POTASSIUM CHLORIDE, MAGNESIUM CHLORIDE, SODIUM PHOSPHATE, DIBASIC, AND POTASSIUM PHOSPHATE .53; .5; .37; .037; .03; .012; .00082 G/100ML; G/100ML; G/100ML; G/100ML; G/100ML; G/100ML; G/100ML
50 INJECTION, SOLUTION INTRAVENOUS CONTINUOUS
Status: DISCONTINUED | OUTPATIENT
Start: 2022-12-28 | End: 2022-12-29

## 2022-12-28 RX ADMIN — WARFARIN SODIUM 5 MG: 5 TABLET ORAL at 18:04

## 2022-12-28 RX ADMIN — Medication 250 MG: at 09:49

## 2022-12-28 RX ADMIN — METOPROLOL TARTRATE 25 MG: 25 TABLET, FILM COATED ORAL at 09:49

## 2022-12-28 RX ADMIN — FERROUS SULFATE TAB 325 MG (65 MG ELEMENTAL FE) 325 MG: 325 (65 FE) TAB at 09:49

## 2022-12-28 RX ADMIN — REMDESIVIR 100 MG: 100 INJECTION, POWDER, LYOPHILIZED, FOR SOLUTION INTRAVENOUS at 01:43

## 2022-12-28 RX ADMIN — ATORVASTATIN CALCIUM 10 MG: 10 TABLET, FILM COATED ORAL at 22:14

## 2022-12-28 RX ADMIN — SODIUM CHLORIDE, SODIUM GLUCONATE, SODIUM ACETATE, POTASSIUM CHLORIDE AND MAGNESIUM CHLORIDE 50 ML/HR: 526; 502; 368; 37; 30 INJECTION, SOLUTION INTRAVENOUS at 12:25

## 2022-12-28 RX ADMIN — METOPROLOL TARTRATE 25 MG: 25 TABLET, FILM COATED ORAL at 22:14

## 2022-12-28 RX ADMIN — Medication 250 MG: at 18:04

## 2022-12-28 NOTE — ASSESSMENT & PLAN NOTE
· Regimen: Metoprolol 25 mg twice daily and anticoagulation with Coumadin 5 mg daily  · INR goal 2-3 - on Admission 2 71  · Resumed warfarin-> INR at goal  · Continue metoprolol  · Rate controlled on admission

## 2022-12-28 NOTE — ASSESSMENT & PLAN NOTE
Lab Results   Component Value Date    EGFR 66 12/28/2022    EGFR 65 12/27/2022    EGFR 51 12/26/2022    CREATININE 1 01 12/28/2022    CREATININE 1 02 12/27/2022    CREATININE 1 25 12/26/2022     · Creatinine 1 0-12  · Creatinine on admission 1 40  · Continue IV fluids  · I's and O's  · Avoid nephrotoxic agents and hypotension  · Continue to trend  · Elevated Cr resolved

## 2022-12-28 NOTE — DISCHARGE SUMMARY
[place]  Discharge- Antonio March 1935, 80 y o  male MRN: [id number]  Unit/Bed#: -[facility] Encounter: [id number]  Primary Care Provider: Tim Quinonez MD   Date and time admitted to hospital: 12/25/2022  6:11 PM    * COVID-19  Assessment & Plan  · Symptoms first onset 12/21  · Vaccinated x4  · Chest x-ray Mild groundglass opacity in the left mid lung, question due to Covid 19     · Weaned to RA  Cardiac/Inflammatory markers:  Troponin 6, 108, 124  CK 1029-> peaked at 1696  dc'd IVF  BNP 6728  CRP 4 5  D-Dimer 2 84  resumed warfarin  IV dexamethasone dc'd  Remdesivir day 4  trend CBC, CMP  Encourage proning and ambulation     Acute respiratory insufficiency-resolved as of 12/28/2022  Assessment & Plan  · SPO2 83% on room air-> Peaked at 4L NC-> weaned to RA  · resolved    Elevated CK  Assessment & Plan  · CK 1029 admission-> peaked  · Downward trending-> almost back to WNL  · dc'd IV fluids   · Continue to trend    Chronic a-fib (HCC)  Assessment & Plan  · Regimen: Metoprolol 25 mg twice daily and anticoagulation with Coumadin 5 mg daily  · INR goal 2-3 - on Admission 2 71  · Resumed warfarin-> INR at goal  · Continue metoprolol  · Rate controlled on admission    Stage 3a chronic kidney disease [facility])  Assessment & Plan  Lab Results   Component Value Date    EGFR 65 12/29/2022    EGFR 66 12/28/2022    EGFR 65 12/27/2022    CREATININE 1 02 12/29/2022    CREATININE 1 01 12/28/2022    CREATININE 1 02 12/27/2022     · Creatinine 1 0-12  · Creatinine on admission 1 40  · dc'd  IV fluids  · I's and O's  · Avoid nephrotoxic agents and hypotension  · Continue to trend  · Elevated Cr resolved    Essential hypertension  Assessment & Plan  · Lopressor 25 mg twice daily, Lasix 5 mg daily, lisinopril 5 mg daily  · Lasix and lisinopril held in setting of increased creatinine    Arteriosclerotic cardiovascular disease  Assessment & Plan  · CAD status post CABG x1  · Continue home statin  · Continue to hold Lasix and lisinopril  · No chest pain  · Cardiology recommending to trend troponin and supportive tx    Anemia of chronic disease  Assessment & Plan  · 10 7 on admission  · Baseline ranges between 10-11  · No signs of active bleeding  · Trend CBC daily  · Iron panel, B12, folate WNL    Medical Problems     Resolved Problems  Date Reviewed: 11/22/2022          Resolved    Acute respiratory insufficiency 12/28/2022     Resolved by  Zbigniew Geiger MD        Discharging Physician / Practitioner: Zbigniew Geiger MD  PCP: Ann Ly MD  Admission Date:   Admission Orders (From admission, onward)     Ordered        12/25/22 1947  INPATIENT ADMISSION  Once                      Discharge Date: 12/29/22    Consultations During Hospital Stay:  · CM, Pt, OT    Procedures Performed:   XR chest 1 view portable   Final Result      Mild groundglass opacity in the left mid lung, question due to Covid 19  Workstation performed: [id number]           ·     Significant Findings / Test Results:   · COVID +    Incidental Findings:   · none   ·     Test Results Pending at Discharge (will require follow up):   · none     Outpatient Tests Requested:  · none    Complications:  Initially after being placed on heparin drip he developed bleeding at the IV site  Despite adequate pressure and bandaging patient continued to bleed  At that time heparin drip was discontinued as well as warfarin to allow for healing/clotting  This resolved  Reason for Admission: [address] Course:   Rene Holliday is a 80 y o  male patient who originally presented to the hospital on 12/25/2022 due to shortness of breath, cough, congestion and overall feeling bad  He was seen earlier in the day and at the time was discharged as he did not require oxygen  Upon return to the ED he was found to have COVID and required oxygen and placed on 4 L nasal cannula    He was placed on mild COVID protocol was started on Decadron and remdesivir  As he was anticoagulated with warfarin due to A  fib he was placed on a heparin drip  He then developed bleeding from his IV site  Despite adequate pressure and bandaging he continued to bleed at the IV site  Heparin drip was discontinued warfarin was not resumed until the following day when bleeding had officially ceased  Oxygen requirements rapidly decreased  He was on room air  He continued to have elevated CK levels and IV fluids were continued for the next couple of days  Patient had some diarrhea unknown if this was due to COVID  Stool studies were done and were negative  Florastor was initiated  He is otherwise remained hemodynamically stable and was medically cleared for discharge following this  He is to follow-up with his PCP in 1 to 2 weeks  Please see above list of diagnoses and related plan for additional information  Condition at Discharge: stable    Discharge Day Visit / Exam:   Subjective:  Larry and examined at bedside   No acute events overnight   Discussed plan of care   All questions and concerns were answered and addressed     Vitals: Blood Pressure: 139/74 (12/29/22 0853)  Pulse: 95 (12/29/22 0853)  Temperature: 98 2 °F (36 8 °C) (12/29/22 0853)  Temp Source: Oral (12/29/22 0853)  Respirations: 16 (12/29/22 0853)  Height: 5' 9" (175 3 cm) (12/26/22 1840)  Weight - Scale: 85 7 kg (189 lb) (12/29/22 0552)  SpO2: 94 % (12/29/22 0853)  Exam:   Physical Exam   Vitals and nursing note reviewed  Constitutional:       Appearance: Normal appearance  He is not ill-appearing  HENT:      Head: Normocephalic and atraumatic  Cardiovascular:      Rate and Rhythm: Normal rate and regular rhythm       Pulses: Normal pulses       Heart sounds: Normal heart sounds  Pulmonary:      Effort: Pulmonary effort is normal       Breath sounds: Normal breath sounds       Comments: on RA  Abdominal:      General: Abdomen is flat   Bowel sounds are normal     Palpations: Abdomen is soft  Musculoskeletal:      Right lower leg: No edema       Left lower leg: No edema  Skin:     General: Skin is warm  Neurological:      General: No focal deficit present       Mental Status: He is alert and oriented to person, place, and time      Discussion with Family: Updated  (son) via phone  Discharge instructions/Information to patient and family:   See after visit summary for information provided to patient and family  Provisions for Follow-Up Care:  See after visit summary for information related to follow-up care and any pertinent home health orders  Disposition:   Home with VNA Services (Reminder: Complete face to face encounter)    Planned Readmission: no     Discharge Statement:  I spent 35 minutes discharging the patient  This time was spent on the day of discharge  I had direct contact with the patient on the day of discharge  Greater than 50% of the total time was spent examining patient, answering all patient questions, arranging and discussing plan of care with patient as well as directly providing post-discharge instructions  Additional time then spent on discharge activities  Discharge Medications:  See after visit summary for reconciled discharge medications provided to patient and/or family        **Please Note: This note may have been constructed using a voice recognition system**

## 2022-12-28 NOTE — ASSESSMENT & PLAN NOTE
· 10 7 on admission  · Baseline ranges between 10-11  · No signs of active bleeding  · Trend CBC daily  · Iron panel, B12, folate pending

## 2022-12-28 NOTE — ASSESSMENT & PLAN NOTE
· Symptoms first onset 12/21  · Vaccinated x4  · Chest x-ray Mild groundglass opacity in the left mid lung, question due to Covid 19     · Weaned to RA  Cardiac/Inflammatory markers:  Troponin 6, 108, 124  CK 1029-> peaked at 1696-> continue IVF  BNP 6728  CRP 4 5  D-Dimer 2 84  resumed warfarin  IV dexamethasone dc'd  Remdesivir day 4  trend CBC, CMP  Encourage proning and ambulation

## 2022-12-28 NOTE — PROGRESS NOTES
[place]  Progress Note - Lashawn Mendoza 1935, 80 y o  male MRN: [id number]  Unit/Bed#: -[facility] Encounter: [id number]  Primary Care Provider: Ngozi Mackey MD   Date and time admitted to hospital: 12/25/2022  6:11 PM    * COVID-19  Assessment & Plan  · Symptoms first onset 12/21  · Vaccinated x4  · Chest x-ray Mild groundglass opacity in the left mid lung, question due to Covid 19     · Weaned to RA  Cardiac/Inflammatory markers:  Troponin 6, 108, 124  CK 1029-> peaked at 1696-> continue IVF  BNP 6728  CRP 4 5  D-Dimer 2 84  resumed warfarin  IV dexamethasone dc'd  Remdesivir day 4  trend CBC, CMP  Encourage proning and ambulation     Acute respiratory insufficiency-resolved as of 12/28/2022  Assessment & Plan  · SPO2 83% on room air-> Peaked at 4L NC-> weaned to RA  · resolved    Elevated CK  Assessment & Plan  · CK 1029 admission-> peaked  · Downward trending  · Continue IV fluids   · Continue to trend    Chronic a-fib (HCC)  Assessment & Plan  · Regimen: Metoprolol 25 mg twice daily and anticoagulation with Coumadin 5 mg daily  · INR goal 2-3 - on Admission 2 71  · Resumed warfarin-> INR at goal  · Continue metoprolol  · Rate controlled on admission    Stage 3a chronic kidney disease [facility])  Assessment & Plan  Lab Results   Component Value Date    EGFR 66 12/28/2022    EGFR 65 12/27/2022    EGFR 51 12/26/2022    CREATININE 1 01 12/28/2022    CREATININE 1 02 12/27/2022    CREATININE 1 25 12/26/2022     · Creatinine 1 0-12  · Creatinine on admission 1 40  · Continue IV fluids  · I's and O's  · Avoid nephrotoxic agents and hypotension  · Continue to trend  · Elevated Cr resolved    Essential hypertension  Assessment & Plan  · Lopressor 25 mg twice daily, Lasix 5 mg daily, lisinopril 5 mg daily  · Lasix and lisinopril held in setting of increased creatinine    Arteriosclerotic cardiovascular disease  Assessment & Plan  · CAD status post CABG x1  · Continue home statin  · Continue to hold Lasix and lisinopril  · No chest pain  · Cardiology recommending to trend troponin and supportive tx    Anemia of chronic disease  Assessment & Plan  · 10 7 on admission  · Baseline ranges between 10-11  · No signs of active bleeding  · Trend CBC daily  · Iron panel, B12, folate pending      VTE Pharmacologic Prophylaxis: VTE Score: 6 High Risk (Score >/= 5) - Pharmacological DVT Prophylaxis Ordered: warfarin (Coumadin)  Sequential Compression Devices Ordered  Patient Centered Rounds: I performed bedside rounds with nursing staff today  Discussions with Specialists or Other Care Team Provider: CM, PT, OT    Education and Discussions with Family / Patient: Updated  (son) via phone  Time Spent for Care: 30 minutes  More than 50% of total time spent on counseling and coordination of care as described above  Current Length of Stay: 3 day(s)  Current Patient Status: Inpatient   Certification Statement: The patient will continue to require additional inpatient hospital stay due to elevated CK  Discharge Plan: Anticipate discharge tomorrow to home with home services  Code Status: Level 3 - DNAR and DNI    Subjective:   Larry and examined at bedside   No acute events overnight   Discussed plan of care   All questions and concerns were answered and addressed       Objective:     Vitals:   Temp (24hrs), Av 5 °F (36 4 °C), Min:97 4 °F (36 3 °C), Max:97 8 °F (36 6 °C)    Temp:  [97 4 °F (36 3 °C)-97 8 °F (36 6 °C)] 97 4 °F (36 3 °C)  HR:  [58-72] 67  Resp:  [19] 19  BP: (158-167)/(66-91) 158/66  SpO2:  [96 %] 96 %  Body mass index is 27 84 kg/m²  Input and Output Summary (last 24 hours): Intake/Output Summary (Last 24 hours) at 2022 1517  Last data filed at 2022 1301  Gross per 24 hour   Intake 2262 09 ml   Output 575 ml   Net 1687 09 ml       Physical Exam:   Physical Exam   Vitals and nursing note reviewed  Constitutional:       Appearance: Normal appearance   He is not ill-appearing  HENT:      Head: Normocephalic and atraumatic  Cardiovascular:      Rate and Rhythm: Normal rate and regular rhythm       Pulses: Normal pulses       Heart sounds: Normal heart sounds  Pulmonary:      Effort: Pulmonary effort is normal       Breath sounds: Normal breath sounds       Comments: on RA  Abdominal:      General: Abdomen is flat  Bowel sounds are normal       Palpations: Abdomen is soft  Musculoskeletal:      Right lower leg: No edema       Left lower leg: No edema  Skin:     General: Skin is warm  Neurological:      General: No focal deficit present       Mental Status: He is alert and oriented to person, place, and time  Additional Data:     Labs:  Results from last 7 days   Lab Units 12/28/22  0628 12/26/22  0634 12/25/22  1848   WBC Thousand/uL 8 88   < > 7 68   HEMOGLOBIN g/dL 10 6*   < > 10 7*   HEMATOCRIT % 34 1*   < > 34 3*   PLATELETS Thousands/uL 141*   < > 118*   NEUTROS PCT %  --   --  83*   LYMPHS PCT %  --   --  4*   MONOS PCT %  --   --  12   EOS PCT %  --   --  0    < > = values in this interval not displayed  Results from last 7 days   Lab Units 12/28/22  0628   SODIUM mmol/L 141   POTASSIUM mmol/L 3 6   CHLORIDE mmol/L 107   CO2 mmol/L 27   BUN mg/dL 31*   CREATININE mg/dL 1 01   ANION GAP mmol/L 7   CALCIUM mg/dL 7 6*   ALBUMIN g/dL 2 8*   TOTAL BILIRUBIN mg/dL 0 40   ALK PHOS U/L 44*   ALT U/L 27   AST U/L 49*   GLUCOSE RANDOM mg/dL 117     Results from last 7 days   Lab Units 12/28/22  0628   INR  2 16*             Results from last 7 days   Lab Units 12/25/22  1848   LACTIC ACID mmol/L 1 3   PROCALCITONIN ng/ml 0 24       Lines/Drains:  Invasive Devices     Peripheral Intravenous Line  Duration           Peripheral IV 12/26/22 Dorsal (posterior); Left Hand 2 days    Peripheral IV 12/26/22 Right Antecubital 2 days                      Imaging: No pertinent imaging reviewed      Recent Cultures (last 7 days):   Results from last 7 days   Lab Units 12/25/22  1848   BLOOD CULTURE  No Growth at 48 hrs  No Growth at 48 hrs  Last 24 Hours Medication List:   Current Facility-Administered Medications   Medication Dose Route Frequency Provider Last Rate   • acetaminophen  650 mg Oral Q6H PRN Callie Swann PA-C     • atorvastatin  10 mg Oral Daily With Dinner Callie Swann PA-C     • ferrous sulfate  325 mg Oral Daily With Breakfast Callie Swann PA-C     • metoprolol tartrate  25 mg Oral Q12H [address] Callie Swann PA-C     • multi-electrolyte  50 mL/hr Intravenous Continuous Kim Layton MD 50 mL/hr (12/28/22 1225)   • remdesivir  100 mg Intravenous Q24H Callie Swann PA-C     • saccharomyces boulardii  250 mg Oral BID Kim Layton MD     • senna  1 tablet Oral HS PRN Callie Swann PA-C     • warfarin  5 mg Oral Daily (warfarin) Kim Layton MD          Today, Patient Was Seen By: Kim Layton MD    **Please Note: This note may have been constructed using a voice recognition system  **

## 2022-12-28 NOTE — PLAN OF CARE
Problem: Potential for Falls  Goal: Patient will remain free of falls  Description: INTERVENTIONS:  - Educate patient/family on patient safety including physical limitations  - Instruct patient to call for assistance with activity   - Consult OT/PT to assist with strengthening/mobility   - Keep Call bell within reach  - Keep bed low and locked with side rails adjusted as appropriate  - Keep care items and personal belongings within reach  - Initiate and maintain comfort rounds  - Make Fall Risk Sign visible to staff  - Offer Toileting every 2 Hours, in advance of need  - Initiate/Maintain bed alarm  - Obtain necessary fall risk management equipment: walker  - Apply yellow socks and bracelet for high fall risk patients  - Consider moving patient to room near nurses station  Outcome: Progressing     Problem: MOBILITY - ADULT  Goal: Maintain or return to baseline ADL function  Description: INTERVENTIONS:  - Educate patient/family on patient safety including physical limitations  - Instruct patient to call for assistance with activity   - Consult OT/PT to assist with strengthening/mobility   - Keep Call bell within reach  - Keep bed low and locked with side rails adjusted as appropriate  - Keep care items and personal belongings within reach  - Initiate and maintain comfort rounds  - Make Fall Risk Sign visible to staff  - Offer Toileting every 2 Hours, in advance of need  - Initiate/Maintain bed alarm  - Obtain necessary fall risk management equipment: walker  - Apply yellow socks and bracelet for high fall risk patients  - Consider moving patient to room near nurses station  Outcome: Progressing  Goal: Maintains/Returns to pre admission functional level  Description: INTERVENTIONS:  - Perform BMAT or MOVE assessment daily    - Set and communicate daily mobility goal to care team and patient/family/caregiver     - Collaborate with rehabilitation services on mobility goals if consulted  - Perform Range of Motion 5 times a day    - Dangle patient 5 times a day  - Stand patient 5 times a day  - Ambulate patient 5 times a day  - Out of bed to chair 3 times a day   - Out of bed for meals 3 times a day  - Out of bed for toileting  - Record patient progress and toleration of activity level   Outcome: Progressing     Problem: INFECTION - ADULT  Goal: Absence or prevention of progression during hospitalization  Description: INTERVENTIONS:  - Assess and monitor for signs and symptoms of infection  - Monitor lab/diagnostic results  - Monitor all insertion sites, i e  indwelling lines, tubes, and drains  - Monitor endotracheal if appropriate and nasal secretions for changes in amount and color  - [place] appropriate cooling/warming therapies per order  - Administer medications as ordered  - Instruct and encourage patient and family to use good hand hygiene technique  - Identify and instruct in appropriate isolation precautions for identified infection/condition  Outcome: Progressing  Goal: Absence of fever/infection during neutropenic period  Description: INTERVENTIONS:  - Monitor WBC    Outcome: Progressing     Problem: SAFETY ADULT  Goal: Patient will remain free of falls  Description: INTERVENTIONS:  - Educate patient/family on patient safety including physical limitations  - Instruct patient to call for assistance with activity   - Consult OT/PT to assist with strengthening/mobility   - Keep Call bell within reach  - Keep bed low and locked with side rails adjusted as appropriate  - Keep care items and personal belongings within reach  - Initiate and maintain comfort rounds  - Make Fall Risk Sign visible to staff  - Offer Toileting every 2 Hours, in advance of need  - Initiate/Maintain bed alarm  - Obtain necessary fall risk management equipment: walker  - Apply yellow socks and bracelet for high fall risk patients  - Consider moving patient to room near nurses station  Outcome: Progressing  Goal: Maintain or return to baseline ADL function  Description: INTERVENTIONS:  - Educate patient/family on patient safety including physical limitations  - Instruct patient to call for assistance with activity   - Consult OT/PT to assist with strengthening/mobility   - Keep Call bell within reach  - Keep bed low and locked with side rails adjusted as appropriate  - Keep care items and personal belongings within reach  - Initiate and maintain comfort rounds  - Make Fall Risk Sign visible to staff  - Offer Toileting every 2 Hours, in advance of need  - Initiate/Maintain bed alarm  - Obtain necessary fall risk management equipment: walker  - Apply yellow socks and bracelet for high fall risk patients  - Consider moving patient to room near nurses station  Outcome: Progressing  Goal: Maintains/Returns to pre admission functional level  Description: INTERVENTIONS:  - Perform BMAT or MOVE assessment daily    - Set and communicate daily mobility goal to care team and patient/family/caregiver  - Collaborate with rehabilitation services on mobility goals if consulted  - Perform Range of Motion 5 times a day    - Dangle patient 5 times a day  - Stand patient 5 times a day  - Ambulate patient 5 times a day  - Out of bed to chair 3 times a day   - Out of bed for meals 3 times a day  - Out of bed for toileting  - Record patient progress and toleration of activity level   Outcome: Progressing     Problem: DISCHARGE PLANNING  Goal: Discharge to home or other facility with appropriate resources  Description: INTERVENTIONS:  - Identify barriers to discharge w/patient and caregiver  - Arrange for needed discharge resources and transportation as appropriate  - Identify discharge learning needs (meds, wound care, etc )  - Arrange for interpretive services to assist at discharge as needed  - Refer to Case Management Department for coordinating discharge planning if the patient needs post-hospital services based on physician/advanced practitioner order or complex needs related to functional status, cognitive ability, or social support system  Outcome: Progressing     Problem: Knowledge Deficit  Goal: Patient/family/caregiver demonstrates understanding of disease process, treatment plan, medications, and discharge instructions  Description: Complete learning assessment and assess knowledge base    Interventions:  - Provide teaching at level of understanding  - Provide teaching via preferred learning methods  Outcome: Progressing

## 2022-12-29 ENCOUNTER — TRANSITIONAL CARE MANAGEMENT (OUTPATIENT)
Dept: FAMILY MEDICINE CLINIC | Facility: CLINIC | Age: 87
End: 2022-12-29

## 2022-12-29 ENCOUNTER — HOME HEALTH ADMISSION (OUTPATIENT)
Dept: HOME HEALTH SERVICES | Facility: HOME HEALTHCARE | Age: 87
End: 2022-12-29

## 2022-12-29 VITALS
DIASTOLIC BLOOD PRESSURE: 74 MMHG | HEART RATE: 95 BPM | TEMPERATURE: 98.2 F | BODY MASS INDEX: 27.99 KG/M2 | WEIGHT: 189 LBS | OXYGEN SATURATION: 94 % | HEIGHT: 69 IN | RESPIRATION RATE: 16 BRPM | SYSTOLIC BLOOD PRESSURE: 139 MMHG

## 2022-12-29 LAB
ANION GAP SERPL CALCULATED.3IONS-SCNC: 5 MMOL/L (ref 4–13)
BUN SERPL-MCNC: 32 MG/DL (ref 5–25)
C DIFF TOX GENS STL QL NAA+PROBE: NEGATIVE
CALCIUM SERPL-MCNC: 7.6 MG/DL (ref 8.3–10.1)
CAMPYLOBACTER DNA SPEC NAA+PROBE: NORMAL
CHLORIDE SERPL-SCNC: 107 MMOL/L (ref 96–108)
CO2 SERPL-SCNC: 29 MMOL/L (ref 21–32)
CREAT SERPL-MCNC: 1.02 MG/DL (ref 0.6–1.3)
ERYTHROCYTE [DISTWIDTH] IN BLOOD BY AUTOMATED COUNT: 13.8 % (ref 11.6–15.1)
GFR SERPL CREATININE-BSD FRML MDRD: 65 ML/MIN/1.73SQ M
GLUCOSE SERPL-MCNC: 91 MG/DL (ref 65–140)
HCT VFR BLD AUTO: 34.3 % (ref 36.5–49.3)
HGB BLD-MCNC: 11 G/DL (ref 12–17)
INR PPP: 2.37 (ref 0.84–1.19)
MCH RBC QN AUTO: 28.4 PG (ref 26.8–34.3)
MCHC RBC AUTO-ENTMCNC: 32.1 G/DL (ref 31.4–37.4)
MCV RBC AUTO: 88 FL (ref 82–98)
PLATELET # BLD AUTO: 162 THOUSANDS/UL (ref 149–390)
PMV BLD AUTO: 11.8 FL (ref 8.9–12.7)
POTASSIUM SERPL-SCNC: 4 MMOL/L (ref 3.5–5.3)
PROTHROMBIN TIME: 27.1 SECONDS (ref 11.6–14.5)
RBC # BLD AUTO: 3.88 MILLION/UL (ref 3.88–5.62)
SALMONELLA DNA SPEC QL NAA+PROBE: NORMAL
SHIGA TOXIN STX GENE SPEC NAA+PROBE: NORMAL
SHIGELLA DNA SPEC QL NAA+PROBE: NORMAL
SODIUM SERPL-SCNC: 141 MMOL/L (ref 135–147)
VIT B12 SERPL-MCNC: 729 PG/ML (ref 100–900)
WBC # BLD AUTO: 10.41 THOUSAND/UL (ref 4.31–10.16)

## 2022-12-29 RX ORDER — BENZONATATE 100 MG/1
100 CAPSULE ORAL 3 TIMES DAILY PRN
Qty: 20 CAPSULE | Refills: 0 | Status: SHIPPED | OUTPATIENT
Start: 2022-12-29

## 2022-12-29 RX ORDER — GUAIFENESIN 600 MG/1
1200 TABLET, EXTENDED RELEASE ORAL EVERY 12 HOURS SCHEDULED
Qty: 15 TABLET | Refills: 0 | Status: SHIPPED | OUTPATIENT
Start: 2022-12-29

## 2022-12-29 RX ADMIN — FERROUS SULFATE TAB 325 MG (65 MG ELEMENTAL FE) 325 MG: 325 (65 FE) TAB at 09:15

## 2022-12-29 RX ADMIN — REMDESIVIR 100 MG: 100 INJECTION, POWDER, LYOPHILIZED, FOR SOLUTION INTRAVENOUS at 01:42

## 2022-12-29 RX ADMIN — METOPROLOL TARTRATE 25 MG: 25 TABLET, FILM COATED ORAL at 09:15

## 2022-12-29 RX ADMIN — Medication 250 MG: at 09:15

## 2022-12-29 NOTE — ASSESSMENT & PLAN NOTE
· 10 7 on admission  · Baseline ranges between 10-11  · No signs of active bleeding  · Trend CBC daily  · Iron panel, B12, folate WNL

## 2022-12-29 NOTE — CASE MANAGEMENT
Case Management Progress Note    Patient name Leonetta Collet  Location /-01 MRN [id number]  : 1935 Date 2022       LOS (days): 4  Geometric Mean LOS (GMLOS) (days): 3 60  Days to GMLOS:0        OBJECTIVE:        Current admission status: Inpatient  Preferred Pharmacy:   [id number] [place] [place], [address] [address]  Phone: [phone] Fax: [address] [place] [address]  Phone: [phone] Fax: [phone]    Primary Care Provider: Xiomara Harding MD    Primary Insurance: MEDICARE  Secondary Insurance: Clay MASON    PROGRESS NOTE:  Cm spoke with pts son Fawn Soliman regarding dc planning for today  HHC is needed  Pts son selected [facility] of the accepting agencies  Cm confirmed  However [facility] is not able to provide a SN so another option was [facility] confirmed [facility]

## 2022-12-29 NOTE — PLAN OF CARE
Problem: Potential for Falls  Goal: Patient will remain free of falls  Description: INTERVENTIONS:  - Educate patient/family on patient safety including physical limitations  - Instruct patient to call for assistance with activity   - Consult OT/PT to assist with strengthening/mobility   - Keep Call bell within reach  - Keep bed low and locked with side rails adjusted as appropriate  - Keep care items and personal belongings within reach  - Initiate and maintain comfort rounds  - Make Fall Risk Sign visible to staff  - Offer Toileting every 2 Hours, in advance of need  - Initiate/Maintain bed alarm  - Obtain necessary fall risk management equipment: walker  - Apply yellow socks and bracelet for high fall risk patients  - Consider moving patient to room near nurses station  12/29/2022 1233 by Ivelisse Kate RN  Outcome: Adequate for Discharge  12/29/2022 1012 by Ivelisse Kate RN  Outcome: Adequate for Discharge     Problem: MOBILITY - ADULT  Goal: Maintain or return to baseline ADL function  Description: INTERVENTIONS:  - Educate patient/family on patient safety including physical limitations  - Instruct patient to call for assistance with activity   - Consult OT/PT to assist with strengthening/mobility   - Keep Call bell within reach  - Keep bed low and locked with side rails adjusted as appropriate  - Keep care items and personal belongings within reach  - Initiate and maintain comfort rounds  - Make Fall Risk Sign visible to staff  - Offer Toileting every 2 Hours, in advance of need  - Initiate/Maintain bed alarm  - Obtain necessary fall risk management equipment: walker  - Apply yellow socks and bracelet for high fall risk patients  - Consider moving patient to room near nurses station  12/29/2022 1233 by Ivelisse Kate RN  Outcome: Adequate for Discharge  12/29/2022 1012 by Ivelisse Kate RN  Outcome: Adequate for Discharge  Goal: Maintains/Returns to pre admission functional level  Description: INTERVENTIONS:  - Perform BMAT or MOVE assessment daily    - Set and communicate daily mobility goal to care team and patient/family/caregiver  - Collaborate with rehabilitation services on mobility goals if consulted  - Perform Range of Motion x times a day  - Reposition patient every x hours    - Dangle patient x times a day  - Stand patient x times a day  - Ambulate patient x times a day  - Out of bed to chair x times a day   - Out of bed for meals x times a day  - Out of bed for toileting  - Record patient progress and toleration of activity level   12/29/2022 1233 by Andi Peterson RN  Outcome: Adequate for Discharge  12/29/2022 1012 by Andi Peterson RN  Outcome: Adequate for Discharge     Problem: PAIN - ADULT  Goal: Verbalizes/displays adequate comfort level or baseline comfort level  Description: Interventions:  - Encourage patient to monitor pain and request assistance  - Assess pain using appropriate pain scale  - Administer analgesics based on type and severity of pain and evaluate response  - Implement non-pharmacological measures as appropriate and evaluate response  - Consider cultural and social influences on pain and pain management  - Notify physician/advanced practitioner if interventions unsuccessful or patient reports new pain  12/29/2022 1233 by Andi Peterson RN  Outcome: Adequate for Discharge  12/29/2022 1012 by Andi Peterson RN  Outcome: Adequate for Discharge     Problem: INFECTION - ADULT  Goal: Absence or prevention of progression during hospitalization  Description: INTERVENTIONS:  - Assess and monitor for signs and symptoms of infection  - Monitor lab/diagnostic results  - Monitor all insertion sites, i e  indwelling lines, tubes, and drains  - Monitor endotracheal if appropriate and nasal secretions for changes in amount and color  - [place] appropriate cooling/warming therapies per order  - Administer medications as ordered  - Instruct and encourage patient and family to use good hand hygiene technique  - Identify and instruct in appropriate isolation precautions for identified infection/condition  12/29/2022 1233 by Bryce Elias RN  Outcome: Adequate for Discharge  12/29/2022 1012 by Bryce Elias RN  Outcome: Adequate for Discharge  Goal: Absence of fever/infection during neutropenic period  Description: INTERVENTIONS:  - Monitor WBC    12/29/2022 1233 by Bryce Elias RN  Outcome: Adequate for Discharge  12/29/2022 1012 by Bryce Elias RN  Outcome: Adequate for Discharge     Problem: SAFETY ADULT  Goal: Patient will remain free of falls  Description: INTERVENTIONS:  - Educate patient/family on patient safety including physical limitations  - Instruct patient to call for assistance with activity   - Consult OT/PT to assist with strengthening/mobility   - Keep Call bell within reach  - Keep bed low and locked with side rails adjusted as appropriate  - Keep care items and personal belongings within reach  - Initiate and maintain comfort rounds  - Make Fall Risk Sign visible to staff  - Offer Toileting every 2 Hours, in advance of need  - Initiate/Maintain bed alarm  - Obtain necessary fall risk management equipment: walker  - Apply yellow socks and bracelet for high fall risk patients  - Consider moving patient to room near nurses station  12/29/2022 1233 by Bryce Elias RN  Outcome: Adequate for Discharge  12/29/2022 1012 by Bryce Elias RN  Outcome: Adequate for Discharge  Goal: Maintain or return to baseline ADL function  Description: INTERVENTIONS:  - Educate patient/family on patient safety including physical limitations  - Instruct patient to call for assistance with activity   - Consult OT/PT to assist with strengthening/mobility   - Keep Call bell within reach  - Keep bed low and locked with side rails adjusted as appropriate  - Keep care items and personal belongings within reach  - Initiate and maintain comfort rounds  - Make Fall Risk Sign visible to staff  - Offer Toileting every 2 Hours, in advance of need  - Initiate/Maintain bed alarm  - Obtain necessary fall risk management equipment: walker  - Apply yellow socks and bracelet for high fall risk patients  - Consider moving patient to room near nurses station  12/29/2022 1233 by Marilu Navarrete RN  Outcome: Adequate for Discharge  12/29/2022 1012 by Marilu Navarrete RN  Outcome: Adequate for Discharge  Goal: Maintains/Returns to pre admission functional level  Description: INTERVENTIONS:  - Perform BMAT or MOVE assessment daily    - Set and communicate daily mobility goal to care team and patient/family/caregiver  - Collaborate with rehabilitation services on mobility goals if consulted  - Perform Range of Motion x times a day  - Reposition patient every x hours    - Dangle patient x times a day  - Stand patient x times a day  - Ambulate patient x times a day  - Out of bed to chair x times a day   - Out of bed for meals xxxxx times a day  - Out of bed for toileting  - Record patient progress and toleration of activity level   12/29/2022 1233 by Marilu Navarrete RN  Outcome: Adequate for Discharge  12/29/2022 1012 by Marilu Navarrete RN  Outcome: Adequate for Discharge     Problem: DISCHARGE PLANNING  Goal: Discharge to home or other facility with appropriate resources  Description: INTERVENTIONS:  - Identify barriers to discharge w/patient and caregiver  - Arrange for needed discharge resources and transportation as appropriate  - Identify discharge learning needs (meds, wound care, etc )  - Arrange for interpretive services to assist at discharge as needed  - Refer to Case Management Department for coordinating discharge planning if the patient needs post-hospital services based on physician/advanced practitioner order or complex needs related to functional status, cognitive ability, or social support system  12/29/2022 1233 by Marilu Navarrete RN  Outcome: Adequate for Discharge  12/29/2022 1012 by [place] Dee RN  Outcome: Adequate for Discharge     Problem: Knowledge Deficit  Goal: Patient/family/caregiver demonstrates understanding of disease process, treatment plan, medications, and discharge instructions  Description: Complete learning assessment and assess knowledge base    Interventions:  - Provide teaching at level of understanding  - Provide teaching via preferred learning methods  12/29/2022 1233 by South Dee RN  Outcome: Adequate for Discharge  12/29/2022 1012 by [place] Dee RN  Outcome: Adequate for Discharge     Problem: RESPIRATORY - ADULT  Goal: Achieves optimal ventilation and oxygenation  Description: INTERVENTIONS:  - Assess for changes in respiratory status  - Assess for changes in mentation and behavior  - Position to facilitate oxygenation and minimize respiratory effort  - Oxygen administered by appropriate delivery if ordered  - Initiate smoking cessation education as indicated  - Encourage broncho-pulmonary hygiene including cough, deep breathe, Incentive Spirometry  - Assess the need for suctioning and aspirate as needed  - Assess and instruct to report SOB or any respiratory difficulty  - Respiratory Therapy support as indicated  12/29/2022 1233 by South Dee RN  Outcome: Adequate for Discharge  12/29/2022 1012 by [place] Dee RN  Outcome: Adequate for Discharge     Problem: GASTROINTESTINAL - ADULT  Goal: Minimal or absence of nausea and/or vomiting  Description: INTERVENTIONS:  - Administer IV fluids if ordered to ensure adequate hydration  - Maintain NPO status until nausea and vomiting are resolved  - Nasogastric tube if ordered  - Administer ordered antiemetic medications as needed  - Provide nonpharmacologic comfort measures as appropriate  - Advance diet as tolerated, if ordered  - Consider nutrition services referral to assist patient with adequate nutrition and appropriate food choices  12/29/2022 1233 by Pepper Fonseca RN  Outcome: Adequate for Discharge  12/29/2022 1012 by Pepper Fonseca RN  Outcome: Adequate for Discharge  Goal: Maintains or returns to baseline bowel function  Description: INTERVENTIONS:  - Assess bowel function  - Encourage oral fluids to ensure adequate hydration  - Administer IV fluids if ordered to ensure adequate hydration  - Administer ordered medications as needed  - Encourage mobilization and activity  - Consider nutritional services referral to assist patient with adequate nutrition and appropriate food choices  12/29/2022 1233 by Pepper Fonseca RN  Outcome: Adequate for Discharge  12/29/2022 1012 by Pepper Fonseca RN  Outcome: Adequate for Discharge  Goal: Maintains adequate nutritional intake  Description: INTERVENTIONS:  - Monitor percentage of each meal consumed  - Identify factors contributing to decreased intake, treat as appropriate  - Assist with meals as needed  - Monitor I&O, weight, and lab values if indicated  - Obtain nutrition services referral as needed  12/29/2022 1233 by Pepper Fonseca RN  Outcome: Adequate for Discharge  12/29/2022 1012 by Pepper Fonseca RN  Outcome: Adequate for Discharge  Goal: Establish and maintain optimal ostomy function  Description: INTERVENTIONS:  - Assess bowel function  - Encourage oral fluids to ensure adequate hydration  - Administer IV fluids if ordered to ensure adequate hydration   - Administer ordered medications as needed  - Encourage mobilization and activity  - Nutrition services referral to assist patient with appropriate food choices  - Assess stoma site  - Consider wound care consult   12/29/2022 1233 by Pepper Fonseca RN  Outcome: Adequate for Discharge  12/29/2022 1012 by Pepper Fonseca RN  Outcome: Adequate for Discharge  Goal: Oral mucous membranes remain intact  Description: INTERVENTIONS  - Assess oral mucosa and hygiene practices  - Implement preventative oral hygiene regimen  - Implement oral medicated treatments as ordered  - Initiate Nutrition services referral as needed  12/29/2022 1233 by Mariajose Martinez RN  Outcome: Adequate for Discharge  12/29/2022 1012 by Mariajose Martinez RN  Outcome: Adequate for Discharge     Problem: Prexisting or High Potential for Compromised Skin Integrity  Goal: Skin integrity is maintained or improved  Description: INTERVENTIONS:  - Identify patients at risk for skin breakdown  - Assess and monitor skin integrity  - Assess and monitor nutrition and hydration status  - Monitor labs   - Assess for incontinence   - Turn and reposition patient  - Assist with mobility/ambulation  - Relieve pressure over bony prominences  - Avoid friction and shearing  - Provide appropriate hygiene as needed including keeping skin clean and dry  - Evaluate need for skin moisturizer/barrier cream  - Collaborate with interdisciplinary team   - Patient/family teaching  - Consider wound care consult   12/29/2022 1233 by Mariajose Martinez RN  Outcome: Adequate for Discharge  12/29/2022 1012 by Mariajose Martinez RN  Outcome: Adequate for Discharge

## 2022-12-29 NOTE — NURSING NOTE
Reviewed d/c instructions, new rx scripts and follow-up appts  With pt  Pt  Was d/c'd to home and transported by his son  Pt  tabitha corrales rolando via [facility] [address] by RN

## 2022-12-29 NOTE — ASSESSMENT & PLAN NOTE
· CK 1029 admission-> peaked  · Downward trending-> almost back to WNL  · dc'd IV fluids   · Continue to trend

## 2022-12-29 NOTE — ASSESSMENT & PLAN NOTE
· Symptoms first onset 12/21  · Vaccinated x4  · Chest x-ray Mild groundglass opacity in the left mid lung, question due to Covid 19     · Weaned to RA  Cardiac/Inflammatory markers:  Troponin 6, 108, 124  CK 1029-> peaked at 1696  dc'd IVF  BNP 6728  CRP 4 5  D-Dimer 2 84  resumed warfarin  IV dexamethasone dc'd  Remdesivir day 4  trend CBC, CMP  Encourage proning and ambulation

## 2022-12-29 NOTE — PLAN OF CARE
Problem: Potential for Falls  Goal: Patient will remain free of falls  Description: INTERVENTIONS:  - Educate patient/family on patient safety including physical limitations  - Instruct patient to call for assistance with activity   - Consult OT/PT to assist with strengthening/mobility   - Keep Call bell within reach  - Keep bed low and locked with side rails adjusted as appropriate  - Keep care items and personal belongings within reach  - Initiate and maintain comfort rounds  - Make Fall Risk Sign visible to staff  - Offer Toileting every 2 Hours, in advance of need  - Initiate/Maintain bed alarm  - Obtain necessary fall risk management equipment: walker  - Apply yellow socks and bracelet for high fall risk patients  - Consider moving patient to room near nurses station  Outcome: Adequate for Discharge     Problem: MOBILITY - ADULT  Goal: Maintain or return to baseline ADL function  Description: INTERVENTIONS:  - Educate patient/family on patient safety including physical limitations  - Instruct patient to call for assistance with activity   - Consult OT/PT to assist with strengthening/mobility   - Keep Call bell within reach  - Keep bed low and locked with side rails adjusted as appropriate  - Keep care items and personal belongings within reach  - Initiate and maintain comfort rounds  - Make Fall Risk Sign visible to staff  - Offer Toileting every 2 Hours, in advance of need  - Initiate/Maintain bed alarm  - Obtain necessary fall risk management equipment: walker  - Apply yellow socks and bracelet for high fall risk patients  - Consider moving patient to room near nurses station  Outcome: Adequate for Discharge  Goal: Maintains/Returns to pre admission functional level  Description: INTERVENTIONS:  - Perform BMAT or MOVE assessment daily    - Set and communicate daily mobility goal to care team and patient/family/caregiver     - Collaborate with rehabilitation services on mobility goals if consulted  - Perform Range of Motion x times a day  - Reposition patient every x hours    - Dangle patient x times a day  - Stand patient x times a day  - Ambulate patient x times a day  - Out of bed to chair x times a day   - Out of bed for meals x times a day  - Out of bed for toileting  - Record patient progress and toleration of activity level   Outcome: Adequate for Discharge     Problem: PAIN - ADULT  Goal: Verbalizes/displays adequate comfort level or baseline comfort level  Description: Interventions:  - Encourage patient to monitor pain and request assistance  - Assess pain using appropriate pain scale  - Administer analgesics based on type and severity of pain and evaluate response  - Implement non-pharmacological measures as appropriate and evaluate response  - Consider cultural and social influences on pain and pain management  - Notify physician/advanced practitioner if interventions unsuccessful or patient reports new pain  Outcome: Adequate for Discharge     Problem: INFECTION - ADULT  Goal: Absence or prevention of progression during hospitalization  Description: INTERVENTIONS:  - Assess and monitor for signs and symptoms of infection  - Monitor lab/diagnostic results  - Monitor all insertion sites, i e  indwelling lines, tubes, and drains  - Monitor endotracheal if appropriate and nasal secretions for changes in amount and color  - [place] appropriate cooling/warming therapies per order  - Administer medications as ordered  - Instruct and encourage patient and family to use good hand hygiene technique  - Identify and instruct in appropriate isolation precautions for identified infection/condition  Outcome: Adequate for Discharge  Goal: Absence of fever/infection during neutropenic period  Description: INTERVENTIONS:  - Monitor WBC    Outcome: Adequate for Discharge     Problem: SAFETY ADULT  Goal: Patient will remain free of falls  Description: INTERVENTIONS:  - Educate patient/family on patient safety including physical limitations  - Instruct patient to call for assistance with activity   - Consult OT/PT to assist with strengthening/mobility   - Keep Call bell within reach  - Keep bed low and locked with side rails adjusted as appropriate  - Keep care items and personal belongings within reach  - Initiate and maintain comfort rounds  - Make Fall Risk Sign visible to staff  - Offer Toileting every 2 Hours, in advance of need  - Initiate/Maintain bed alarm  - Obtain necessary fall risk management equipment: walker  - Apply yellow socks and bracelet for high fall risk patients  - Consider moving patient to room near nurses station  Outcome: Adequate for Discharge  Goal: Maintain or return to baseline ADL function  Description: INTERVENTIONS:  - Educate patient/family on patient safety including physical limitations  - Instruct patient to call for assistance with activity   - Consult OT/PT to assist with strengthening/mobility   - Keep Call bell within reach  - Keep bed low and locked with side rails adjusted as appropriate  - Keep care items and personal belongings within reach  - Initiate and maintain comfort rounds  - Make Fall Risk Sign visible to staff  - Offer Toileting every 2 Hours, in advance of need  - Initiate/Maintain bed alarm  - Obtain necessary fall risk management equipment: walker  - Apply yellow socks and bracelet for high fall risk patients  - Consider moving patient to room near nurses station  Outcome: Adequate for Discharge  Goal: Maintains/Returns to pre admission functional level  Description: INTERVENTIONS:  - Perform BMAT or MOVE assessment daily    - Set and communicate daily mobility goal to care team and patient/family/caregiver  - Collaborate with rehabilitation services on mobility goals if consulted  - Perform Range of Motion x times a day  - Reposition patient every x hours    - Dangle patient x times a day  - Stand patient x times a day  - Ambulate patient x times a day  - Out of bed to chair x times a day   - Out of bed for meals x times a day  - Out of bed for toileting  - Record patient progress and toleration of activity level   Outcome: Adequate for Discharge     Problem: DISCHARGE PLANNING  Goal: Discharge to home or other facility with appropriate resources  Description: INTERVENTIONS:  - Identify barriers to discharge w/patient and caregiver  - Arrange for needed discharge resources and transportation as appropriate  - Identify discharge learning needs (meds, wound care, etc )  - Arrange for interpretive services to assist at discharge as needed  - Refer to Case Management Department for coordinating discharge planning if the patient needs post-hospital services based on physician/advanced practitioner order or complex needs related to functional status, cognitive ability, or social support system  Outcome: Adequate for Discharge     Problem: Knowledge Deficit  Goal: Patient/family/caregiver demonstrates understanding of disease process, treatment plan, medications, and discharge instructions  Description: Complete learning assessment and assess knowledge base    Interventions:  - Provide teaching at level of understanding  - Provide teaching via preferred learning methods  Outcome: Adequate for Discharge     Problem: RESPIRATORY - ADULT  Goal: Achieves optimal ventilation and oxygenation  Description: INTERVENTIONS:  - Assess for changes in respiratory status  - Assess for changes in mentation and behavior  - Position to facilitate oxygenation and minimize respiratory effort  - Oxygen administered by appropriate delivery if ordered  - Initiate smoking cessation education as indicated  - Encourage broncho-pulmonary hygiene including cough, deep breathe, Incentive Spirometry  - Assess the need for suctioning and aspirate as needed  - Assess and instruct to report SOB or any respiratory difficulty  - Respiratory Therapy support as indicated  Outcome: Adequate for Discharge     Problem: GASTROINTESTINAL - ADULT  Goal: Minimal or absence of nausea and/or vomiting  Description: INTERVENTIONS:  - Administer IV fluids if ordered to ensure adequate hydration  - Maintain NPO status until nausea and vomiting are resolved  - Nasogastric tube if ordered  - Administer ordered antiemetic medications as needed  - Provide nonpharmacologic comfort measures as appropriate  - Advance diet as tolerated, if ordered  - Consider nutrition services referral to assist patient with adequate nutrition and appropriate food choices  Outcome: Adequate for Discharge  Goal: Maintains or returns to baseline bowel function  Description: INTERVENTIONS:  - Assess bowel function  - Encourage oral fluids to ensure adequate hydration  - Administer IV fluids if ordered to ensure adequate hydration  - Administer ordered medications as needed  - Encourage mobilization and activity  - Consider nutritional services referral to assist patient with adequate nutrition and appropriate food choices  Outcome: Adequate for Discharge  Goal: Maintains adequate nutritional intake  Description: INTERVENTIONS:  - Monitor percentage of each meal consumed  - Identify factors contributing to decreased intake, treat as appropriate  - Assist with meals as needed  - Monitor I&O, weight, and lab values if indicated  - Obtain nutrition services referral as needed  Outcome: Adequate for Discharge  Goal: Establish and maintain optimal ostomy function  Description: INTERVENTIONS:  - Assess bowel function  - Encourage oral fluids to ensure adequate hydration  - Administer IV fluids if ordered to ensure adequate hydration   - Administer ordered medications as needed  - Encourage mobilization and activity  - Nutrition services referral to assist patient with appropriate food choices  - Assess stoma site  - Consider wound care consult   Outcome: Adequate for Discharge  Goal: Oral mucous membranes remain intact  Description: INTERVENTIONS  - Assess oral mucosa and hygiene practices  - Implement preventative oral hygiene regimen  - Implement oral medicated treatments as ordered  - Initiate Nutrition services referral as needed  Outcome: Adequate for Discharge     Problem: Prexisting or High Potential for Compromised Skin Integrity  Goal: Skin integrity is maintained or improved  Description: INTERVENTIONS:  - Identify patients at risk for skin breakdown  - Assess and monitor skin integrity  - Assess and monitor nutrition and hydration status  - Monitor labs   - Assess for incontinence   - Turn and reposition patient  - Assist with mobility/ambulation  - Relieve pressure over bony prominences  - Avoid friction and shearing  - Provide appropriate hygiene as needed including keeping skin clean and dry  - Evaluate need for skin moisturizer/barrier cream  - Collaborate with interdisciplinary team   - Patient/family teaching  - Consider wound care consult   Outcome: Adequate for Discharge

## 2022-12-29 NOTE — ASSESSMENT & PLAN NOTE
Lab Results   Component Value Date    EGFR 65 12/29/2022    EGFR 66 12/28/2022    EGFR 65 12/27/2022    CREATININE 1 02 12/29/2022    CREATININE 1 01 12/28/2022    CREATININE 1 02 12/27/2022     · Creatinine 1 0-12  · Creatinine on admission 1 40  · dc'd  IV fluids  · I's and O's  · Avoid nephrotoxic agents and hypotension  · Continue to trend  · Elevated Cr resolved

## 2022-12-29 NOTE — PLAN OF CARE
Problem: Potential for Falls  Goal: Patient will remain free of falls  Description: INTERVENTIONS:  - Educate patient/family on patient safety including physical limitations  - Instruct patient to call for assistance with activity   - Consult OT/PT to assist with strengthening/mobility   - Keep Call bell within reach  - Keep bed low and locked with side rails adjusted as appropriate  - Keep care items and personal belongings within reach  - Initiate and maintain comfort rounds  - Make Fall Risk Sign visible to staff  - Offer Toileting every 2 Hours, in advance of need  - Initiate/Maintain bed alarm  - Obtain necessary fall risk management equipment: walker  - Apply yellow socks and bracelet for high fall risk patients  - Consider moving patient to room near nurses station  Outcome: Progressing     Problem: MOBILITY - ADULT  Goal: Maintain or return to baseline ADL function  Description: INTERVENTIONS:  - Educate patient/family on patient safety including physical limitations  - Instruct patient to call for assistance with activity   - Consult OT/PT to assist with strengthening/mobility   - Keep Call bell within reach  - Keep bed low and locked with side rails adjusted as appropriate  - Keep care items and personal belongings within reach  - Initiate and maintain comfort rounds  - Make Fall Risk Sign visible to staff  - Offer Toileting every 2 Hours, in advance of need  - Initiate/Maintain bed alarm  - Obtain necessary fall risk management equipment: walker  - Apply yellow socks and bracelet for high fall risk patients  - Consider moving patient to room near nurses station  Outcome: Progressing  Goal: Maintains/Returns to pre admission functional level  Description: INTERVENTIONS:  - Perform BMAT or MOVE assessment daily    - Set and communicate daily mobility goal to care team and patient/family/caregiver     - Collaborate with rehabilitation services on mobility goals if consulted  - Perform Range of Motion 3 times a day  - Reposition patient every 2 hours    - Dangle patient 3 times a day  - Stand patient 3 times a day  - Ambulate patient 3 times a day  - Out of bed to chair 3 times a day   - Out of bed for meals 3 times a day  - Out of bed for toileting  - Record patient progress and toleration of activity level   Outcome: Progressing     Problem: PAIN - ADULT  Goal: Verbalizes/displays adequate comfort level or baseline comfort level  Description: Interventions:  - Encourage patient to monitor pain and request assistance  - Assess pain using appropriate pain scale  - Administer analgesics based on type and severity of pain and evaluate response  - Implement non-pharmacological measures as appropriate and evaluate response  - Consider cultural and social influences on pain and pain management  - Notify physician/advanced practitioner if interventions unsuccessful or patient reports new pain  Outcome: Progressing     Problem: INFECTION - ADULT  Goal: Absence or prevention of progression during hospitalization  Description: INTERVENTIONS:  - Assess and monitor for signs and symptoms of infection  - Monitor lab/diagnostic results  - Monitor all insertion sites, i e  indwelling lines, tubes, and drains  - Monitor endotracheal if appropriate and nasal secretions for changes in amount and color  - [place] appropriate cooling/warming therapies per order  - Administer medications as ordered  - Instruct and encourage patient and family to use good hand hygiene technique  - Identify and instruct in appropriate isolation precautions for identified infection/condition  Outcome: Progressing  Goal: Absence of fever/infection during neutropenic period  Description: INTERVENTIONS:  - Monitor WBC    Outcome: Progressing     Problem: SAFETY ADULT  Goal: Patient will remain free of falls  Description: INTERVENTIONS:  - Educate patient/family on patient safety including physical limitations  - Instruct patient to call for assistance with activity   - Consult OT/PT to assist with strengthening/mobility   - Keep Call bell within reach  - Keep bed low and locked with side rails adjusted as appropriate  - Keep care items and personal belongings within reach  - Initiate and maintain comfort rounds  - Make Fall Risk Sign visible to staff  - Offer Toileting every 2 Hours, in advance of need  - Initiate/Maintain bed alarm  - Obtain necessary fall risk management equipment: walker  - Apply yellow socks and bracelet for high fall risk patients  - Consider moving patient to room near nurses station  Outcome: Progressing  Goal: Maintain or return to baseline ADL function  Description: INTERVENTIONS:  - Educate patient/family on patient safety including physical limitations  - Instruct patient to call for assistance with activity   - Consult OT/PT to assist with strengthening/mobility   - Keep Call bell within reach  - Keep bed low and locked with side rails adjusted as appropriate  - Keep care items and personal belongings within reach  - Initiate and maintain comfort rounds  - Make Fall Risk Sign visible to staff  - Offer Toileting every 2 Hours, in advance of need  - Initiate/Maintain bed alarm  - Obtain necessary fall risk management equipment: walker  - Apply yellow socks and bracelet for high fall risk patients  - Consider moving patient to room near nurses station  Outcome: Progressing  Goal: Maintains/Returns to pre admission functional level  Description: INTERVENTIONS:  - Perform BMAT or MOVE assessment daily    - Set and communicate daily mobility goal to care team and patient/family/caregiver  - Collaborate with rehabilitation services on mobility goals if consulted  - Perform Range of Motion 3 times a day  - Reposition patient every 2 hours    - Dangle patient 3 times a day  - Stand patient 3 times a day  - Ambulate patient 3 times a day  - Out of bed to chair 3 times a day   - Out of bed for meals 3 times a day  - Out of bed for toileting  - Record patient progress and toleration of activity level   Outcome: Progressing     Problem: DISCHARGE PLANNING  Goal: Discharge to home or other facility with appropriate resources  Description: INTERVENTIONS:  - Identify barriers to discharge w/patient and caregiver  - Arrange for needed discharge resources and transportation as appropriate  - Identify discharge learning needs (meds, wound care, etc )  - Arrange for interpretive services to assist at discharge as needed  - Refer to Case Management Department for coordinating discharge planning if the patient needs post-hospital services based on physician/advanced practitioner order or complex needs related to functional status, cognitive ability, or social support system  Outcome: Progressing     Problem: Knowledge Deficit  Goal: Patient/family/caregiver demonstrates understanding of disease process, treatment plan, medications, and discharge instructions  Description: Complete learning assessment and assess knowledge base    Interventions:  - Provide teaching at level of understanding  - Provide teaching via preferred learning methods  Outcome: Progressing     Problem: RESPIRATORY - ADULT  Goal: Achieves optimal ventilation and oxygenation  Description: INTERVENTIONS:  - Assess for changes in respiratory status  - Assess for changes in mentation and behavior  - Position to facilitate oxygenation and minimize respiratory effort  - Oxygen administered by appropriate delivery if ordered  - Initiate smoking cessation education as indicated  - Encourage broncho-pulmonary hygiene including cough, deep breathe, Incentive Spirometry  - Assess the need for suctioning and aspirate as needed  - Assess and instruct to report SOB or any respiratory difficulty  - Respiratory Therapy support as indicated  Outcome: Progressing     Problem: GASTROINTESTINAL - ADULT  Goal: Minimal or absence of nausea and/or vomiting  Description: INTERVENTIONS:  - Administer IV fluids if ordered to ensure adequate hydration  - Maintain NPO status until nausea and vomiting are resolved  - Nasogastric tube if ordered  - Administer ordered antiemetic medications as needed  - Provide nonpharmacologic comfort measures as appropriate  - Advance diet as tolerated, if ordered  - Consider nutrition services referral to assist patient with adequate nutrition and appropriate food choices  Outcome: Progressing  Goal: Maintains or returns to baseline bowel function  Description: INTERVENTIONS:  - Assess bowel function  - Encourage oral fluids to ensure adequate hydration  - Administer IV fluids if ordered to ensure adequate hydration  - Administer ordered medications as needed  - Encourage mobilization and activity  - Consider nutritional services referral to assist patient with adequate nutrition and appropriate food choices  Outcome: Progressing  Goal: Maintains adequate nutritional intake  Description: INTERVENTIONS:  - Monitor percentage of each meal consumed  - Identify factors contributing to decreased intake, treat as appropriate  - Assist with meals as needed  - Monitor I&O, weight, and lab values if indicated  - Obtain nutrition services referral as needed  Outcome: Progressing  Goal: Establish and maintain optimal ostomy function  Description: INTERVENTIONS:  - Assess bowel function  - Encourage oral fluids to ensure adequate hydration  - Administer IV fluids if ordered to ensure adequate hydration   - Administer ordered medications as needed  - Encourage mobilization and activity  - Nutrition services referral to assist patient with appropriate food choices  - Assess stoma site  - Consider wound care consult   Outcome: Progressing  Goal: Oral mucous membranes remain intact  Description: INTERVENTIONS  - Assess oral mucosa and hygiene practices  - Implement preventative oral hygiene regimen  - Implement oral medicated treatments as ordered  - Initiate Nutrition services referral as needed  Outcome: Progressing

## 2022-12-30 LAB
BACTERIA BLD CULT: NORMAL
BACTERIA BLD CULT: NORMAL

## 2023-01-01 ENCOUNTER — TELEPHONE (OUTPATIENT)
Dept: OTHER | Facility: OTHER | Age: 88
End: 2023-01-01

## 2023-01-01 NOTE — TELEPHONE ENCOUNTER
[facility] visiting nurse requesting a call back from on call provider to notify them that the patient is new to their service and he very depressed and would like to discuss    Paged on call provider

## 2023-01-09 ENCOUNTER — OFFICE VISIT (OUTPATIENT)
Dept: FAMILY MEDICINE CLINIC | Facility: CLINIC | Age: 88
End: 2023-01-09

## 2023-01-09 VITALS
SYSTOLIC BLOOD PRESSURE: 134 MMHG | BODY MASS INDEX: 26.84 KG/M2 | OXYGEN SATURATION: 99 % | WEIGHT: 181.2 LBS | TEMPERATURE: 96.3 F | HEART RATE: 61 BPM | DIASTOLIC BLOOD PRESSURE: 76 MMHG | HEIGHT: 69 IN

## 2023-01-09 DIAGNOSIS — U07.1 COVID: Primary | ICD-10-CM

## 2023-01-09 NOTE — PROGRESS NOTES
Assessment/Plan:    No problem-specific Assessment & Plan notes found for this encounter  Diagnoses and all orders for this visit:    COVID          Subjective:   Chief Complaint   Patient presents with   • TCM        Patient ID: Boy Price is a 80 y o  male  HPI    The following portions of the patient's history were reviewed and updated as appropriate: allergies, current medications, past family history, past medical history, past social history, past surgical history and problem list     Review of Systems   Constitutional: Negative for fatigue, fever and unexpected weight change  HENT: Negative for congestion, sinus pain and sore throat  Eyes: Negative for visual disturbance  Respiratory: Negative for shortness of breath and wheezing  Cardiovascular: Negative for chest pain and palpitations  Gastrointestinal: Negative for abdominal pain, nausea and vomiting  Musculoskeletal: Negative  Negative for arthralgias and myalgias  Neurological: Negative for syncope, weakness and numbness  Psychiatric/Behavioral: Negative  Negative for confusion, dysphoric mood and suicidal ideas  Objective:  Vitals:    01/09/23 1129   BP: 134/76   BP Location: Left arm   Patient Position: Sitting   Cuff Size: Standard   Pulse: 61   Temp: (!) 96 3 °F (35 7 °C)   TempSrc: Tympanic   SpO2: 99%   Weight: 82 2 kg (181 lb 3 2 oz)   Height: 5' 9" (1 753 m)      Physical Exam  Constitutional:       Appearance: He is well-developed  HENT:      Right Ear: Ear canal normal  Tympanic membrane is not injected  Left Ear: Ear canal normal  Tympanic membrane is not injected  Nose: Nose normal    Eyes:      General:         Right eye: No discharge  Left eye: No discharge  Conjunctiva/sclera: Conjunctivae normal       Pupils: Pupils are equal, round, and reactive to light  Neck:      Thyroid: No thyromegaly  Cardiovascular:      Rate and Rhythm: Normal rate and regular rhythm  Heart sounds: Normal heart sounds  No murmur heard  Pulmonary:      Effort: Pulmonary effort is normal  No respiratory distress  Breath sounds: Normal breath sounds  No wheezing  Abdominal:      General: Bowel sounds are normal  There is no distension  Palpations: Abdomen is soft  Tenderness: There is no abdominal tenderness  Musculoskeletal:         General: Normal range of motion  Cervical back: Normal range of motion and neck supple  Lymphadenopathy:      Cervical: No cervical adenopathy  Skin:     General: Skin is warm and dry  Neurological:      Mental Status: He is alert and oriented to person, place, and time  He is not disoriented  Sensory: No sensory deficit  Gait: Gait normal       Deep Tendon Reflexes: Reflexes are normal and symmetric  Psychiatric:         Speech: Speech normal          Behavior: Behavior normal          Thought Content:  Thought content normal          Judgment: Judgment normal

## 2023-01-27 ENCOUNTER — TELEPHONE (OUTPATIENT)
Dept: FAMILY MEDICINE CLINIC | Facility: CLINIC | Age: 88
End: 2023-01-27

## 2023-01-27 NOTE — TELEPHONE ENCOUNTER
Keya Sagastume from [facility] called  Pt is being discharged and no other service is needed

## 2023-06-12 NOTE — CONSULTS
Consultation - GI   Ana Cristina Holt 80 y o  male MRN: [id number]  Unit/Bed#: -02 Encounter: [id number]    Consults    ASSESSMENT:  1  Acute blood loss anemia - Acute drop in hgb without overt GI bleeding in setting of elevated INR  Given h/o anemia 2 years ago as well, this may be due to coumadin causing GI blood loss  2  Mitral Valve Replacement on Coumadin with elevated INR @ admission  3  ? melena    PLAN:  - Given pt on aspirin, will need to rule out PUD w EGD tomorrow  - INR needs to be < 1 75 tomorrow AM - got vit K in ER but recommend if INR still elevated, we will let the INR drift down slowly (h/o mitral valve repair)  If pt w overt GI bleeding, recommend FFP to more rapidly reverse the coumadin effects   - Please obtain records from his previous EGD/Colonoscopy  - Hold Coumadin  - Active T&S  - Monitor H&H  - Protonix IV BID for now    Chief Complaint   Patient presents with    Weakness - Generalized     pt presents to ED from home c/o weakness, and SOB on exertion       HPI:   This is a 80 y o male who GI is consulted for acute anemia  Patient with pmhx sig for mitral valve replacement on coumadin, h/o CAD s/p CABG, who presented to the ER after progressive fatigue, weakness, and lightheadedness/dizziness for the past week  He had a CBC last month w hgb 12 4  He states he took his coumadin as normal  However, in the past week, he was progressively feeling weak and 2 days prior to adm, he felt short of breath  Denies any abd pain/n/v/diarrhea/GIB  Thinks maybe one stool was dark brown  No hematochezia or BRBPR  No CP/F/C  Denies any NSAIDS except his baby aspirin  Denies any falls/LOC/bruising  No change in meds  He had a h/o anemia and states he had an EGD/Colonoscopy 2 years ago at [address]  They told him there was nothing found and that he needs to eat red meat again - which he has been doing  His INR at adm was elevated @4 54 and hgb 5 4  History reviewed   No Daughter called and would like us to notify [facility] about him not being able to drive. pertinent past medical history  Past Surgical History:   Procedure Laterality Date    CORONARY ARTERY BYPASS GRAFT      MITRAL VALVE REPLACEMENT         Prescriptions Prior to Admission   Medication    aspirin 81 mg chewable tablet    atorvastatin (LIPITOR) 10 mg tablet    ferrous sulfate (IRON SUPPLEMENT) 325 (65 Fe) mg tablet    furosemide (LASIX) 20 mg tablet    latanoprost (XALATAN) 0 005 % ophthalmic solution    lisinopril (ZESTRIL) 5 mg tablet    metoprolol tartrate (LOPRESSOR) 25 mg tablet    potassium chloride (K-DUR,KLOR-CON) 20 mEq tablet    warfarin (COUMADIN) 5 mg tablet       No Known Allergies    Social History     Family History   Problem Relation Age of Onset    Alzheimer's disease Mother        Review of Systems:  General ROS: negative for - chills, fever, night sweats or weight loss  Psychological ROS: negative for - anxiety or depression  ENT ROS: negative for - headaches or sore throat  Hematological and Lymphatic ROS: negative for - bleeding problems or bruising  Endocrine ROS: negative for - malaise/lethargy, palpitations, polydipsia/polyuria or temperature intolerance  Respiratory ROS: no cough, shortness of breath, or wheezing  negative for - orthopnea, shortness of breath, sputum changes, stridor or wheezing  Cardiovascular ROS: negative for - chest pain, edema, irregular heartbeat or palpitations  + for DELG[place]O  Gastrointestinal ROS: negative for - abdominal pain, appetite loss, blood in stools, change in bowel habits, change in stools, constipation, diarrhea, gas/bloating, heartburn, hematemesis, melena, nausea/vomiting, stool incontinence or swallowing difficulty/pain  Genito-Urinary ROS: negative for - hematuria, incontinence or urinary frequency/urgency  Musculoskeletal ROS: negative for - joint pain or muscular weakness  Neurological ROS: negative for - confusion, headaches, seizures or tremors   + lightheadedness/dizziness    /67 (BP Location: Left arm)   Pulse 88 Temp 98 5 °F (36 9 °C) (Oral)   Resp 15   Ht 5' 11" (1 803 m)   Wt 86 6 kg (190 lb 14 7 oz)   SpO2 100%   BMI 26 63 kg/m²     PHYSICALEXAM:  General appearance: alert, appears stated age and cooperative  Appears pale    HEENT: Normocephalic, w/o obvious abnormality, atraumatic, PERRL, EOM's intact  Throat: lips, mucosa, and tongue normal; teeth and gums normal  Neck: no adenopathy, no JVD, supple, symmetrical, trachea midline  Lungs: clear to auscultation bilaterally, No wheeze/rales  Heart: regular rate and rhythm, S1, S2 normal, no murmur  Abdomen: soft, non-tender; bowel sounds normal; no masses,  no organomegaly  Extremities: extremities normal, atraumatic, no cyanosis or edema  Skin: Skin color, texture, turgor normal  No rashes or lesions  Neurologic: Grossly normal      Lab Results   Component Value Date    GLUCOSE 95 09/11/2015    CALCIUM 8 2 (L) 11/13/2018     09/11/2015    K 4 1 11/13/2018    CO2 24 11/13/2018     11/13/2018    BUN 34 (H) 11/13/2018    CREATININE 1 26 11/13/2018     Lab Results   Component Value Date    WBC 6 05 11/13/2018    HGB 5 4 (LL) 11/13/2018    HCT 17 9 (L) 11/13/2018    MCV 93 11/13/2018     11/13/2018     Lab Results   Component Value Date    ALT 22 11/13/2018    AST 21 11/13/2018    ALKPHOS 44 (L) 11/13/2018    BILITOT 0 67 09/11/2015     Lab Results   Component Value Date    INR 4 54 (H) 11/13/2018     No components found for: AMYLJKJJJASE  No results found for: LIPASE  No results found for: IRON, TIBC, FERRITIN

## 2023-06-14 NOTE — ASSESSMENT & PLAN NOTE
-- DO NOT REPLY / DO NOT REPLY ALL --  -- Message is from [employer] Center [employer] ([facility]) --    General Patient Message:     Patient states for the past couple weeks she has had Muscle pain, can not roll over in bed and can not stand.    Seems to be getting worse and worse.    Please call patient to discuss as soon possible.     Alternative phone number:     Can a detailed message be left? Yes    Message Turnaround:     Is it Working Hours? Yes - Working Hours     [place]:    Please give this turnaround time to the caller:   \"This message will be sent to [state Provider's name]. The clinical team will fulfill your request as soon as they review your message.\"                       · CK 1029 admission  · Suspect that this is secondary to dehydration  · Will place on gentle IV fluids and recheck in morning

## 2023-12-19 ENCOUNTER — OFFICE VISIT (OUTPATIENT)
Dept: FAMILY MEDICINE CLINIC | Facility: CLINIC | Age: 88
End: 2023-12-19
Payer: MEDICARE

## 2023-12-19 VITALS
OXYGEN SATURATION: 99 % | BODY MASS INDEX: 27.47 KG/M2 | SYSTOLIC BLOOD PRESSURE: 124 MMHG | WEIGHT: 186 LBS | DIASTOLIC BLOOD PRESSURE: 78 MMHG | HEART RATE: 51 BPM

## 2023-12-19 DIAGNOSIS — D63.8 ANEMIA OF CHRONIC DISEASE: ICD-10-CM

## 2023-12-19 DIAGNOSIS — F33.9 DEPRESSION, RECURRENT (HCC): ICD-10-CM

## 2023-12-19 DIAGNOSIS — Z71.89 ADVANCED CARE PLANNING/COUNSELING DISCUSSION: ICD-10-CM

## 2023-12-19 DIAGNOSIS — D68.9 COAGULOPATHY (HCC): ICD-10-CM

## 2023-12-19 DIAGNOSIS — I25.10 ARTERIOSCLEROTIC CARDIOVASCULAR DISEASE: ICD-10-CM

## 2023-12-19 DIAGNOSIS — N18.31 CHRONIC KIDNEY DISEASE, STAGE 3A (HCC): ICD-10-CM

## 2023-12-19 DIAGNOSIS — Z00.00 MEDICARE ANNUAL WELLNESS VISIT, SUBSEQUENT: Primary | ICD-10-CM

## 2023-12-19 PROCEDURE — G0439 PPPS, SUBSEQ VISIT: HCPCS

## 2023-12-19 PROCEDURE — 99214 OFFICE O/P EST MOD 30 MIN: CPT

## 2023-12-19 NOTE — PROGRESS NOTES
" Assessment and Plan:     Problem List Items Addressed This Visit       Anemia of chronic disease     Patient declined any routine blood work.          Arteriosclerotic cardiovascular disease     Follows with [facility]         Coagulopathy (HCC)    Depression, recurrent (HCC)     Patient admits to continued depression and grief since the loss of his wife approx. 2 years ago. 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 Patient lives alone but has the support of his children. Reports that his [employer]ucian[employer] has offered support groups but he is not interested. Patient denies SI but states that he wishes he would die so that he can be with his wife but \"I think I'm too healthy.\" Son is supportive with patient's decisions. Discussed pharm intervention if in the future his Is interested--- consider lexapro.         Advanced care planning/counseling discussion     Patient was referred to hospice by [facility] prior to having his pacer/icd replaced. Patient was initially not going to have any further interventions but when he was told he would lose the ability to drive if he didn't have the procedure, he decided to go through with it. Patient states that he no longer wants any life-saving procedures/treatments. Wants to stay on his current medication regimen but is interested in discussing end of life care. Palliative care referred.          Relevant Orders    Ambulatory Referral to Palliative Care     Other Visit Diagnoses       Medicare annual wellness visit, subsequent    -  Primary    Chronic kidney disease, stage 3a (HCC)              BMI Counseling: Body mass index is 27.47 kg/m². The BMI is above normal. Nutrition recommendations include decreasing portion sizes. Exercise recommendations include exercising 3-5 times per week. Rationale for BMI follow-up plan is due to patient being overweight or obese.       Preventive health issues were discussed with patient, and age appropriate screening " tests were ordered as noted in patient's After Visit Summary.  Personalized health advice and appropriate referrals for health education or preventive services given if needed, as noted in patient's After Visit Summary.     History of Present Illness:     Patient presents for a Medicare Wellness Visit    Depression  This is a chronic problem. The current episode started more than 1 year ago. The problem occurs constantly. The problem has been unchanged. Associated symptoms include fatigue. Pertinent negatives include no abdominal pain, anorexia, arthralgias, change in bowel habit, chest pain, chills, congestion, coughing, diaphoresis, fever, headaches, joint swelling, myalgias, nausea, neck pain, numbness, rash, sore throat, swollen glands, urinary symptoms, vertigo, visual change, vomiting or weakness. Exacerbated by: loss of wife 2 years ago. He has tried nothing for the symptoms.      Patient Care Team:  Andrey Bateman MD as PCP - General (Family Medicine)  MD Daly Dick MD as Endoscopist     Review of Systems:     Review of Systems   Constitutional:  Positive for fatigue. Negative for chills, diaphoresis and fever.   HENT:  Negative for congestion and sore throat.    Respiratory:  Negative for cough.    Cardiovascular:  Negative for chest pain.   Gastrointestinal:  Negative for abdominal pain, anorexia, change in bowel habit, nausea and vomiting.   Musculoskeletal:  Negative for arthralgias, joint swelling, myalgias and neck pain.   Skin:  Negative for rash.   Neurological:  Negative for vertigo, weakness, numbness and headaches.   Psychiatric/Behavioral:  Positive for depression and dysphoric mood. Negative for self-injury and suicidal ideas.         Problem List:     Patient Active Problem List   Diagnosis    Anemia of chronic disease    Anxiety    Arteriosclerotic cardiovascular disease    Arthritis    Herpes zoster    Hyperglycemia    Mixed hyperlipidemia    Essential hypertension     Hypocalcemia    Insomnia    GI bleed    Coagulopathy (HCC)    Paroxysmal atrial fibrillation (HCC)    Primary osteoarthritis of both hips    Trochanteric bursitis of both hips    Depression, recurrent (HCC)    Stage 3a chronic kidney disease (HCC)    Chronic a-fib (HCC)    COVID-19    Elevated CK    Advanced care planning/counseling discussion      Past Medical and Surgical History:     Past Medical History:   Diagnosis Date    Amputation of toe (HCC)     3rd toe;  accident    Arthritis      Past Surgical History:   Procedure Laterality Date    CATARACT EXTRACTION, BILATERAL      COLONOSCOPY N/A 11/15/2018    Procedure: COLONOSCOPY;  Surgeon: Daly Celeste MD;  Location:  MAIN OR;  Service: Gastroenterology    CORONARY ARTERY BYPASS GRAFT      ESOPHAGOGASTRODUODENOSCOPY N/A 2018    Procedure: ESOPHAGOGASTRODUODENOSCOPY (EGD);  Surgeon: Zhang Cassidy MD;  Location:  MAIN OR;  Service: Gastroenterology    MITRAL VALVE REPLACEMENT        Family History:     Family History   Problem Relation Age of Onset    Alzheimer's disease Mother       Social History:     Social History     Socioeconomic History    Marital status: /Civil Union     Spouse name: None    Number of children: None    Years of education: None    Highest education level: None   Occupational History    Occupation: retired   Tobacco Use    Smoking status: Former     Current packs/day: 0.00     Types: Cigarettes     Quit date: 1985     Years since quittin.0    Smokeless tobacco: Never   Vaping Use    Vaping status: Never Used   Substance and Sexual Activity    Alcohol use: Yes     Comment: occasional    Drug use: No    Sexual activity: None   Other Topics Concern    None   Social History Narrative    No smoke exposure     Social Determinants of Health     Financial Resource Strain: Low Risk  (2023)    Overall Financial Resource Strain (CARDIA)     Difficulty of Paying Living Expenses: Not hard at all   Food  Insecurity: No Food Insecurity (12/27/2022)    Hunger Vital Sign     Worried About Running Out of Food in the Last Year: Never true     Ran Out of Food in the Last Year: Never true   Transportation Needs: No Transportation Needs (12/19/2023)    PRAPARE - Transportation     Lack of Transportation (Medical): No     Lack of Transportation (Non-Medical): No   Physical Activity: Not on file   Stress: Not on file   Social Connections: Not on file   Intimate Partner Violence: Not on file   Housing Stability: Low Risk  (12/27/2022)    Housing Stability Vital Sign     Unable to Pay for Housing in the Last Year: No     Number of Places Lived in the Last Year: 1     Unstable Housing in the Last Year: No      Medications and Allergies:     Current Outpatient Medications   Medication Sig Dispense Refill    atorvastatin (LIPITOR) 10 mg tablet Take by mouth      benzonatate (TESSALON PERLES) 100 mg capsule Take 1 capsule (100 mg total) by mouth 3 (three) times a day as needed for cough 20 capsule 0    ferrous sulfate 325 (65 Fe) mg tablet Take 325 mg by mouth      furosemide (LASIX) 20 mg tablet Take by mouth      guaiFENesin (MUCINEX) 600 mg 12 hr tablet Take 2 tablets (1,200 mg total) by mouth every 12 (twelve) hours 15 tablet 0    lisinopril (ZESTRIL) 5 mg tablet Take 5 mg by mouth daily      metoprolol tartrate (LOPRESSOR) 25 mg tablet Take 25 mg by mouth every 12 (twelve) hours      potassium chloride (K-DUR,KLOR-CON) 20 mEq tablet Take 20 mEq by mouth daily      warfarin (COUMADIN) 5 mg tablet Take 5 mg by mouth daily       No current facility-administered medications for this visit.     Allergies   Allergen Reactions    Adhesive [Medical Tape] Rash      Immunizations:     Immunization History   Administered Date(s) Administered    COVID-19 MODERNA VACC 0.25 ML IM BOOSTER 11/18/2021, 05/02/2022    COVID-19 MODERNA VACC 0.5 ML IM 02/13/2021, 03/11/2021    Influenza Quadrivalent Preservative Free 3 years and older IM  10/17/2014    Influenza Quadrivalent, 6-35 Months IM 09/18/2015    Influenza Split High Dose Preservative Free IM 09/20/2016, 10/04/2017    Influenza, high dose seasonal 0.7 mL 10/23/2018, 09/16/2020, 10/05/2021, 11/22/2022    Influenza, seasonal, injectable 11/01/2011, 12/03/2012, 12/02/2013    Pneumococcal Conjugate 13-Valent 10/23/2018    Pneumococcal Polysaccharide PPV23 10/01/2001    TD (adult) Preservative Free 05/01/2008, 04/30/2019    Td (adult), adsorbed 04/30/2019      Health Maintenance:     There are no preventive care reminders to display for this patient.      Topic Date Due    Influenza Vaccine (1) 09/01/2023    COVID-19 Vaccine (5 - 2023-24 season) 09/01/2023      Medicare Screening Tests and Risk Assessments:     Last Medicare Wellness visit information reviewed, patient interviewed and updates made to the record today.      Health Risk Assessment:   Patient rates overall health as good. Patient feels that their physical health rating is same. Patient is dissatisfied with their life. Eyesight was rated as same. Hearing was rated as same. Patient feels that their emotional and mental health rating is same. Patients states they are never, rarely angry. Patient states they are always unusually tired/fatigued. Pain experienced in the last 7 days has been none. Patient states that he has experienced no weight loss or gain in last 6 months.     Depression Screening:   PHQ-9 Score: 0      Fall Risk Screening:   In the past year, patient has experienced: no history of falling in past year      Home Safety:  Patient does not have trouble with stairs inside or outside of their home. Patient has working smoke alarms and has working carbon monoxide detector. Home safety hazards include: none.     Nutrition:   Current diet is Regular.     Medications:   Patient is currently taking over-the-counter supplements. OTC medications include: see medication list. Patient is able to manage medications.     Activities of  Daily Living (ADLs)/Instrumental Activities of Daily Living (IADLs):   Walk and transfer into and out of bed and chair?: Yes  Dress and groom yourself?: Yes    Bathe or shower yourself?: Yes    Feed yourself? Yes  Do your laundry/housekeeping?: Yes  Manage your money, pay your bills and track your expenses?: Yes  Make your own meals?: Yes    Do your own shopping?: Yes    Previous Hospitalizations:   Any hospitalizations or ED visits within the last 12 months?: Yes    How many hospitalizations have you had in the last year?: 1-2    Advance Care Planning:   Living will: Yes    Durable POA for healthcare: Yes    Advanced directive: Yes    End of Life Decisions reviewed with patient: Yes    Provider agrees with end of life decisions: Yes      Cognitive Screening:   Provider or family/friend/caregiver concerned regarding cognition?: No    PREVENTIVE SCREENINGS      Cardiovascular Screening:    General: Screening Not Indicated and History Lipid Disorder      Diabetes Screening:     General: Screening Current      Colorectal Cancer Screening:     General: Screening Not Indicated      Prostate Cancer Screening:    General: Screening Not Indicated      Abdominal Aortic Aneurysm (AAA) Screening:    Risk factors include: tobacco use        Lung Cancer Screening:     General: Screening Not Indicated    Screening, Brief Intervention, and Referral to Treatment (SBIRT)    Screening      Single Item Drug Screening:  How often have you used an illegal drug (including marijuana) or a prescription medication for non-medical reasons in the past year? never    Single Item Drug Screen Score: 0  Interpretation: Negative screen for possible drug use disorder    No results found.     Physical Exam:     /78 (BP Location: Left arm, Patient Position: Sitting, Cuff Size: Standard)   Pulse (!) 51   Wt 84.4 kg (186 lb)   SpO2 99%   BMI 27.47 kg/m²     Physical Exam  Vitals and nursing note reviewed.   Constitutional:       General: He  is not in acute distress.     Appearance: He is well-developed.   HENT:      Head: Normocephalic and atraumatic.   Eyes:      Conjunctiva/sclera: Conjunctivae normal.   Cardiovascular:      Rate and Rhythm: Normal rate and regular rhythm.      Heart sounds: No murmur heard.  Pulmonary:      Effort: Pulmonary effort is normal. No respiratory distress.      Breath sounds: Normal breath sounds.   Abdominal:      Palpations: Abdomen is soft.      Tenderness: There is no abdominal tenderness.   Musculoskeletal:         General: No swelling.      Cervical back: Neck supple.   Skin:     General: Skin is warm and dry.      Capillary Refill: Capillary refill takes less than 2 seconds.   Neurological:      Mental Status: He is alert and oriented to person, place, and time.   Psychiatric:         Attention and Perception: Attention and perception normal.         Mood and Affect: Mood is depressed. Affect is flat.         Speech: Speech normal.         Behavior: Behavior normal. Behavior is cooperative.         Thought Content: Thought content normal. Thought content does not include homicidal or suicidal ideation. Thought content does not include homicidal or suicidal plan.         Cognition and Memory: Cognition and memory normal.         Judgment: Judgment normal.          SHAWNA Fisher

## 2023-12-19 NOTE — PATIENT INSTRUCTIONS
Medicare Preventive Visit Patient Instructions  Thank you for completing your Welcome to Medicare Visit or Medicare Annual Wellness Visit today. Your next wellness visit will be due in one year (12/19/2024).  The screening/preventive services that you may require over the next 5-10 years are detailed below. Some tests may not apply to you based off risk factors and/or age. Screening tests ordered at today's visit but not completed yet may show as past due. Also, please note that scanned in results may not display below.  Preventive Screenings:  Service Recommendations Previous Testing/Comments   Colorectal Cancer Screening  Colonoscopy    Fecal Occult Blood Test (FOBT)/Fecal Immunochemical Test (FIT)  Fecal DNA/Cologuard Test  Flexible Sigmoidoscopy Age: 45-75 years old   Colonoscopy: every 10 years (May be performed more frequently if at higher risk)  OR  FOBT/FIT: every 1 year  OR  Cologuard: every 3 years  OR  Sigmoidoscopy: every 5 years  Screening may be recommended earlier than age 45 if at higher risk for colorectal cancer. Also, an individualized decision between you and your healthcare provider will decide whether screening between the ages of 76-85 would be appropriate. Colonoscopy: Not on file  FOBT/FIT: Not on file  Cologuard: Not on file  Sigmoidoscopy: Not on file          Prostate Cancer Screening Individualized decision between patient and health care provider in men between ages of 55-69   Medicare will cover every 12 months beginning on the day after your 50th birthday PSA: No results in last 5 years           Hepatitis C Screening Once for adults born between 1945 and 1965  More frequently in patients at high risk for Hepatitis C Hep C Antibody: Not on file        Diabetes Screening 1-2 times per year if you're at risk for diabetes or have pre-diabetes Fasting glucose: 102 mg/dL (11/1/2022)  A1C: No results in last 5 years (No results in last 5 years)      Cholesterol Screening Once every 5 years  if you don't have a lipid disorder. May order more often based on risk factors. Lipid panel: 11/01/2022         Other Preventive Screenings Covered by Medicare:  Abdominal Aortic Aneurysm (AAA) Screening: covered once if your at risk. You're considered to be at risk if you have a family history of AAA or a male between the age of 65-75 who smoking at least 100 cigarettes in your lifetime.  Lung Cancer Screening: covers low dose CT scan once per year if you meet all of the following conditions: (1) Age 55-77; (2) No signs or symptoms of lung cancer; (3) Current smoker or have quit smoking within the last 15 years; (4) You have a tobacco smoking history of at least 20 pack years (packs per day x number of years you smoked); (5) You get a written order from a healthcare provider.  Glaucoma Screening: covered annually if you're considered high risk: (1) You have diabetes OR (2) Family history of glaucoma OR (3)  aged 50 and older OR (4)  American aged 65 and older  Osteoporosis Screening: covered every 2 years if you meet one of the following conditions: (1) Have a vertebral abnormality; (2) On glucocorticoid therapy for more than 3 months; (3) Have primary hyperparathyroidism; (4) On osteoporosis medications and need to assess response to drug therapy.  HIV Screening: covered annually if you're between the age of 15-65. Also covered annually if you are younger than 15 and older than 65 with risk factors for HIV infection. For pregnant patients, it is covered up to 3 times per pregnancy.    Immunizations:  Immunization Recommendations   Influenza Vaccine Annual influenza vaccination during flu season is recommended for all persons aged >= 6 months who do not have contraindications   Pneumococcal Vaccine   * Pneumococcal conjugate vaccine = PCV13 (Prevnar 13), PCV15 (Vaxneuvance), PCV20 (Prevnar 20)  * Pneumococcal polysaccharide vaccine = PPSV23 (Pneumovax) Adults 19-63 yo with certain risk  factors or if 65+ yo  If never received any pneumonia vaccine: recommend Prevnar 20 (PCV20)  Give PCV20 if previously received 1 dose of PCV13 or PPSV23   Hepatitis B Vaccine 3 dose series if at intermediate or high risk (ex: diabetes, end stage renal disease, liver disease)   Respiratory syncytial virus (RSV) Vaccine - COVERED BY MEDICARE PART D  * RSVPreF3 (Arexvy) CDC recommends that adults 60 years of age and older may receive a single dose of RSV vaccine using shared clinical decision-making (SCDM)   Tetanus (Td) Vaccine - COST NOT COVERED BY MEDICARE PART B Following completion of primary series, a booster dose should be given every 10 years to maintain immunity against tetanus. Td may also be given as tetanus wound prophylaxis.   Tdap Vaccine - COST NOT COVERED BY MEDICARE PART B Recommended at least once for all adults. For pregnant patients, recommended with each pregnancy.   Shingles Vaccine (Shingrix) - COST NOT COVERED BY MEDICARE PART B  2 shot series recommended in those 19 years and older who have or will have weakened immune systems or those 50 years and older     Health Maintenance Due:  There are no preventive care reminders to display for this patient.  Immunizations Due:      Topic Date Due   • Influenza Vaccine (1) 09/01/2023   • COVID-19 Vaccine (5 - 2023-24 season) 09/01/2023     Advance Directives   What are advance directives?  Advance directives are legal documents that state your wishes and plans for medical care. These plans are made ahead of time in case you lose your ability to make decisions for yourself. Advance directives can apply to any medical decision, such as the treatments you want, and if you want to donate organs.   What are the types of advance directives?  There are many types of advance directives, and each state has rules about how to use them. You may choose a combination of any of the following:  Living will:  This is a written record of the treatment you want. You can  also choose which treatments you do not want, which to limit, and which to stop at a certain time. This includes surgery, medicine, IV fluid, and tube feedings.   Durable power of  for healthcare (DPAHC):  This is a written record that states who you want to make healthcare choices for you when you are unable to make them for yourself. This person, called a proxy, is usually a family member or a friend. You may choose more than 1 proxy.  Do not resuscitate (DNR) order:  A DNR order is used in case your heart stops beating or you stop breathing. It is a request not to have certain forms of treatment, such as CPR. A DNR order may be included in other types of advance directives.  Medical directive:  This covers the care that you want if you are in a coma, near death, or unable to make decisions for yourself. You can list the treatments you want for each condition. Treatment may include pain medicine, surgery, blood transfusions, dialysis, IV or tube feedings, and a ventilator (breathing machine).  Values history:  This document has questions about your views, beliefs, and how you feel and think about life. This information can help others choose the care that you would choose.  Why are advance directives important?  An advance directive helps you control your care. Although spoken wishes may be used, it is better to have your wishes written down. Spoken wishes can be misunderstood, or not followed. Treatments may be given even if you do not want them. An advance directive may make it easier for your family to make difficult choices about your care.   Weight Management   Why it is important to manage your weight:  Being overweight increases your risk of health conditions such as heart disease, high blood pressure, type 2 diabetes, and certain types of cancer. It can also increase your risk for osteoarthritis, sleep apnea, and other respiratory problems. Aim for a slow, steady weight loss. Even a small amount of  weight loss can lower your risk of health problems.  How to lose weight safely:  A safe and healthy way to lose weight is to eat fewer calories and get regular exercise. You can lose up about 1 pound a week by decreasing the number of calories you eat by 500 calories each day.   Healthy meal plan for weight management:  A healthy meal plan includes a variety of foods, contains fewer calories, and helps you stay healthy. A healthy meal plan includes the following:  Eat whole-grain foods more often.  A healthy meal plan should contain fiber. Fiber is the part of grains, fruits, and vegetables that is not broken down by your body. Whole-grain foods are healthy and provide extra fiber in your diet. Some examples of whole-grain foods are whole-wheat breads and pastas, oatmeal, brown rice, and bulgur.  Eat a variety of vegetables every day.  Include dark, leafy greens such as spinach, kale, axel greens, and mustard greens. Eat yellow and orange vegetables such as carrots, sweet potatoes, and winter squash.   Eat a variety of fruits every day.  Choose fresh or canned fruit (canned in its own juice or light syrup) instead of juice. Fruit juice has very little or no fiber.  Eat low-fat dairy foods.  Drink fat-free (skim) milk or 1% milk. Eat fat-free yogurt and low-fat cottage cheese. Try low-fat cheeses such as mozzarella and other reduced-fat cheeses.  Choose meat and other protein foods that are low in fat.  Choose beans or other legumes such as split peas or lentils. Choose fish, skinless poultry (chicken or turkey), or lean cuts of red meat (beef or pork). Before you cook meat or poultry, cut off any visible fat.   Use less fat and oil.  Try baking foods instead of frying them. Add less fat, such as margarine, sour cream, regular salad dressing and mayonnaise to foods. Eat fewer high-fat foods. Some examples of high-fat foods include french fries, doughnuts, ice cream, and cakes.  Eat fewer sweets.  Limit foods and  drinks that are high in sugar. This includes candy, cookies, regular soda, and sweetened drinks.  Exercise:  Exercise at least 30 minutes per day on most days of the week. Some examples of exercise include walking, biking, dancing, and swimming. You can also fit in more physical activity by taking the stairs instead of the elevator or parking farther away from stores. Ask your healthcare provider about the best exercise plan for you.      © Copyright [employer] 2018 Information is for End User's use only and may not be sold, redistributed or otherwise used for commercial purposes. All illustrations and images included in CareNotes® are the copyrighted property of A.D.A.M., Inc. or [employer]

## 2023-12-19 NOTE — ASSESSMENT & PLAN NOTE
Patient was referred to hospice by [facility] prior to having his pacer/icd replaced. Patient was initially not going to have any further interventions but when he was told he would lose the ability to drive if he didn't have the procedure, he decided to go through with it. Patient states that he no longer wants any life-saving procedures/treatments. Wants to stay on his current medication regimen but is interested in discussing end of life care. Palliative care referred.

## 2023-12-19 NOTE — ASSESSMENT & PLAN NOTE
"Patient admits to continued depression and grief since the loss of his wife approx. 2 years ago. 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 Patient lives alone but has the support of his children. Reports that his [employer] has offered support groups but he is not interested. Patient denies SI but states that he wishes he would die so that he can be with his wife but \"I think I'm too healthy.\" Son is supportive with patient's decisions. Discussed pharm intervention if in the future his Is interested--- consider lexapro.  "

## 2024-02-07 ENCOUNTER — CONSULT (OUTPATIENT)
Age: 89
End: 2024-02-07
Payer: MEDICARE

## 2024-02-07 VITALS
DIASTOLIC BLOOD PRESSURE: 70 MMHG | HEART RATE: 80 BPM | SYSTOLIC BLOOD PRESSURE: 132 MMHG | WEIGHT: 190.8 LBS | BODY MASS INDEX: 28.18 KG/M2 | TEMPERATURE: 96.4 F | OXYGEN SATURATION: 97 %

## 2024-02-07 DIAGNOSIS — N18.31 STAGE 3A CHRONIC KIDNEY DISEASE (HCC): ICD-10-CM

## 2024-02-07 DIAGNOSIS — D68.9 COAGULOPATHY (HCC): ICD-10-CM

## 2024-02-07 DIAGNOSIS — Z71.89 ADVANCED CARE PLANNING/COUNSELING DISCUSSION: ICD-10-CM

## 2024-02-07 DIAGNOSIS — Z51.5 PALLIATIVE CARE PATIENT: ICD-10-CM

## 2024-02-07 DIAGNOSIS — I48.20 CHRONIC A-FIB (HCC): ICD-10-CM

## 2024-02-07 DIAGNOSIS — F33.9 DEPRESSION, RECURRENT (HCC): Primary | ICD-10-CM

## 2024-02-07 DIAGNOSIS — D63.8 ANEMIA OF CHRONIC DISEASE: ICD-10-CM

## 2024-02-07 PROCEDURE — 99205 OFFICE O/P NEW HI 60 MIN: CPT | Performed by: PHYSICIAN ASSISTANT

## 2024-02-07 PROCEDURE — 99215 OFFICE O/P EST HI 40 MIN: CPT | Performed by: PHYSICIAN ASSISTANT

## 2024-02-07 RX ORDER — MIRTAZAPINE 7.5 MG/1
7.5 TABLET, FILM COATED ORAL
Qty: 30 TABLET | Refills: 1 | Status: SHIPPED | OUTPATIENT
Start: 2024-02-07

## 2024-02-07 NOTE — LETTER
February 7, 2024     SHAWNA Fisher  [address] [address]    Patient: Larry Jenkins   YOB: 1935   Date of Visit: 2/7/2024       Dear Dr. Palencia:    Thanks for the referral! We will continue to follow with Larry. Below are my notes for this consultation.    If you have questions, please do not hesitate to call me. I look forward to following your patient along with you.         Sincerely,        Shana Bynum PA-C        CC: MD Shana Hwang PA-C  2/7/2024 12:59 PM  Sign when Signing Visit      Outpatient Consultation - Palliative and Supportive Care   Larry Jenkins 88 y.o. male [id number]    Assessment & Plan  Problems actively addressed:  1. Depression, recurrent (HCC)    2. Anemia of chronic disease    3. Stage 3a chronic kidney disease (HCC)    4. Coagulopathy (HCC)    5. Chronic a-fib (HCC)    6. Palliative care patient    7. Advanced care planning/counseling discussion      PLAN:  I introduced the role of palliative and supportive care to Larry and sons Bigg and Kenn.  Larry has little motivation and no enjoyment in his life.  He fondly recalls his wife and the experiences they had together. She passed October 16, 2021.  He misses her and also is not able to do things he used to enjoy such as working outside or making model ships or candles.  Emotional support provided.  Larry endorses diminished appetite as well as insomnia.  He is willing to consider mirtazapine 7.5 mg to help with sleep, depression, and appetite. If this is not effective, would consider increasing dose vs rotating to trazodone or SSRI/SNRI.   I offered to connect him with our [facility] palliative counselors however he is not interested in this.  I offered to help locate support groups.  He says he does have these through the [employer] however is not interested in verbal processing.   Larry is independent and able to take care of himself,  do his food shopping, and handle his finances.  We discussed his goals of care extensively.  I explained that he would not currently qualify for hospice care however he is certainly appropriate for ongoing palliative and supportive care.  ACP - We discussed completing advance directives.  He has POA/living will paperwork and his sons will facilitate getting me a copy of this.  I recommend he complete a POLST form stating his wishes which he can keep on refrigerator and he will consider.  RTC - 3 months for check in.     Medications adjusted this encounter:  Requested Prescriptions     Signed Prescriptions Disp Refills   • mirtazapine (REMERON) 7.5 MG tablet 30 tablet 1     Sig: Take 1 tablet (7.5 mg total) by mouth daily at bedtime     No orders of the defined types were placed in this encounter.    There are no discontinued medications.    PPS: 90%      Larry Jenkins was seen today for symptoms and planning cares related to above illnesses.  Above orders were sent electronically, or provided in hardcopy in clinic.  I have reviewed the patient's controlled substance dispensing history in the Prescription Drug Monitoring Program in compliance with the [place] regulations before prescribing any controlled substances.  PDMP reviewed; no active scripts.     We appreciate the referral, and wish for him to continue to follow with us.  If there are questions or concerns, please contact us through our clinic/answering service 24 hours a day, seven days a week.    Shana Bynum PA-C  [facility]  [phone]    Visit Information    Accompanied By: Accompanied by 2 sons    Source of History: Self, Family member    History Limitations: None     History of Present Illness      Larry Jenkins is a 88 y.o. male who presents as a referral from ILIANA MATOS of primary care for primary palliative diagnosis of Afib with pacemaker.  Consultation is requested for: symptom management, goal of  care assessment and decisional support, disease process education and discussion of prognosis, advance care planning, emotional support in the setting of serious illness.    Larry has a PMHx of arthritis, cardiomyopathy, history of mitral valve replacement, A-fib with pacemaker, and CAD S/P CABGx1 in 2001.  He also has a history of reactive depression especially since his wife passed away 2 years ago.  Larry was initially referred to hospice care by his [place] cardiologist as his pacer/ICD was expiring however he elected to have the device replaced because he did not want to lose the ability to drive.  During his Medicare annual wellness visit he expressed dissatisfaction with his quality of life and continuing to see if doctors and therefore was referred to palliative and supportive care for additional support.    He presents today with both sons for initial consultation 2/7/2024.     Pertinent Palliative Care Domains    Physical Symptoms:ambulates    Psychological Symptoms:no anxiety, good insight, depression    Social Aspects: excellent family support    Spiritual Aspects: has visiting        Advance Directive and Living Will:     Yes, has documents; I asked him to email them in or bring them in to be scanned.   Power of :    POLST:   We discussed POLST form today.     Historical Data  Past Medical History:   Diagnosis Date   • Amputation of toe (HCC)     3rd toe;  accident   • Arthritis      Past Surgical History:   Procedure Laterality Date   • CATARACT EXTRACTION, BILATERAL     • COLONOSCOPY N/A 11/15/2018    Procedure: COLONOSCOPY;  Surgeon: Daly Celeste MD;  Location:  MAIN OR;  Service: Gastroenterology   • CORONARY ARTERY BYPASS GRAFT     • ESOPHAGOGASTRODUODENOSCOPY N/A 11/14/2018    Procedure: ESOPHAGOGASTRODUODENOSCOPY (EGD);  Surgeon: Zhang Cassidy MD;  Location:  MAIN OR;  Service: Gastroenterology   • MITRAL VALVE REPLACEMENT       Social History     Socioeconomic  History   • Marital status: /Civil Union     Spouse name: Not on file   • Number of children: Not on file   • Years of education: Not on file   • Highest education level: Not on file   Occupational History   • Occupation: retired   Tobacco Use   • Smoking status: Former     Current packs/day: 0.00     Types: Cigarettes     Quit date: 1985     Years since quittin.2   • Smokeless tobacco: Never   Vaping Use   • Vaping status: Never Used   Substance and Sexual Activity   • Alcohol use: Yes     Comment: occasional   • Drug use: No   • Sexual activity: Not on file   Other Topics Concern   • Not on file   Social History Narrative    No smoke exposure     Social Determinants of Health     Financial Resource Strain: Low Risk  (2023)    Overall Financial Resource Strain (CARDIA)    • Difficulty of Paying Living Expenses: Not hard at all   Food Insecurity: No Food Insecurity (2022)    Hunger Vital Sign    • Worried About Running Out of Food in the Last Year: Never true    • Ran Out of Food in the Last Year: Never true   Transportation Needs: No Transportation Needs (2023)    PRAPARE - Transportation    • Lack of Transportation (Medical): No    • Lack of Transportation (Non-Medical): No   Physical Activity: Not on file   Stress: Not on file   Social Connections: Not on file   Intimate Partner Violence: Not on file   Housing Stability: Low Risk  (2022)    Housing Stability Vital Sign    • Unable to Pay for Housing in the Last Year: No    • Number of Places Lived in the Last Year: 1    • Unstable Housing in the Last Year: No     Family History   Problem Relation Age of Onset   • Alzheimer's disease Mother      Allergies   Allergen Reactions   • Adhesive [Medical Tape] Rash     Current Outpatient Medications   Medication Sig Dispense Refill   • atorvastatin (LIPITOR) 10 mg tablet Take by mouth     • benzonatate (TESSALON PERLES) 100 mg capsule Take 1 capsule (100 mg total) by mouth 3  (three) times a day as needed for cough 20 capsule 0   • ferrous sulfate 325 (65 Fe) mg tablet Take 325 mg by mouth     • furosemide (LASIX) 20 mg tablet Take by mouth     • guaiFENesin (MUCINEX) 600 mg 12 hr tablet Take 2 tablets (1,200 mg total) by mouth every 12 (twelve) hours 15 tablet 0   • lisinopril (ZESTRIL) 5 mg tablet Take 5 mg by mouth daily     • metoprolol tartrate (LOPRESSOR) 25 mg tablet Take 25 mg by mouth every 12 (twelve) hours     • mirtazapine (REMERON) 7.5 MG tablet Take 1 tablet (7.5 mg total) by mouth daily at bedtime 30 tablet 1   • potassium chloride (K-DUR,KLOR-CON) 20 mEq tablet Take 20 mEq by mouth daily     • warfarin (COUMADIN) 5 mg tablet Take 5 mg by mouth daily       No current facility-administered medications for this visit.       Review of Systems   Constitutional: Positive for decreased appetite. Negative for malaise/fatigue.   HENT:  Negative for congestion and ear discharge.    Musculoskeletal:  Negative for back pain and falls.   Gastrointestinal:  Negative for bloating, abdominal pain, nausea and vomiting.   Psychiatric/Behavioral:  Positive for depression. Negative for altered mental status and substance abuse. The patient has insomnia. The patient is not nervous/anxious.       Vital Signs    /70 (BP Location: Right arm, Patient Position: Sitting, Cuff Size: Standard)   Pulse 80   Temp (!) 96.4 °F (35.8 °C) (Temporal)   Wt 86.5 kg (190 lb 12.8 oz)   SpO2 97%   BMI 28.18 kg/m²     Physical Exam and Objective Data  Physical Exam  Vitals and nursing note reviewed.   Constitutional:       General: He is not in acute distress.     Appearance: He is well-developed. He is ill-appearing (chronically).   HENT:      Head: Normocephalic and atraumatic.   Eyes:      Conjunctiva/sclera: Conjunctivae normal.   Cardiovascular:      Rate and Rhythm: Normal rate.   Pulmonary:      Effort: Pulmonary effort is normal. No respiratory distress.   Musculoskeletal:         General: No  swelling.      Cervical back: Neck supple.   Skin:     General: Skin is warm and dry.   Neurological:      Mental Status: He is alert and oriented to person, place, and time. Mental status is at baseline.   Psychiatric:         Mood and Affect: Mood normal.         Behavior: Behavior normal.       Radiology and Laboratory:  I personally reviewed and interpreted the following results: CBC, CMP    60 minutes was spent face to face with Larry Jenkins with greater than 50% of the time spent in counseling or coordination of care including discussions of etiology of diagnosis, pathogenesis of diagnosis, prognosis of diagnosis, follow up requirements, risk factors and risk reduction of disease, patient and family counseling/involvement in care, and compliance with treatment regimen. reviewed chart, reviewed lab values, reviewed imaging, provided medical updates, discussed palliative care and symptom management, assessed POA/HCA, provided supportive listening, provided anticipatory guidance, provided psychosocial and emotional support, assessed competency and decision-making, and facilitated interdisciplinary communication. All of the patient's questions were answered during this discussion.

## 2024-02-07 NOTE — PROGRESS NOTES
Outpatient Consultation - Palliative and Supportive Care   Larry Jenkins 88 y.o. male [id number]    Assessment & Plan  Problems actively addressed:  1. Depression, recurrent (HCC)    2. Anemia of chronic disease    3. Stage 3a chronic kidney disease (HCC)    4. Coagulopathy (HCC)    5. Chronic a-fib (HCC)    6. Palliative care patient    7. Advanced care planning/counseling discussion      PLAN:  I introduced the role of palliative and supportive care to Larry and sons Bigg and Kenn.  Larry has little motivation and no enjoyment in his life.  He fondly recalls his wife and the experiences they had together. She passed October 16, 2021.  He misses her and also is not able to do things he used to enjoy such as working outside or making model ships or candles.  Emotional support provided.  Larry endorses diminished appetite as well as insomnia.  He is willing to consider mirtazapine 7.5 mg to help with sleep, depression, and appetite. If this is not effective, would consider increasing dose vs rotating to trazodone or SSRI/SNRI.   I offered to connect him with our [facility] palliative counselors however he is not interested in this.  I offered to help locate support groups.  He says he does have these through the [employer] however is not interested in verbal processing.   Larry is independent and able to take care of himself, do his food shopping, and handle his finances.  We discussed his goals of care extensively.  I explained that he would not currently qualify for hospice care however he is certainly appropriate for ongoing palliative and supportive care.  ACP - We discussed completing advance directives.  He has POA/living will paperwork and his sons will facilitate getting me a copy of this.  I recommend he complete a POLST form stating his wishes which he can keep on refrigerator and he will consider.  RTC - 3 months for check in.     Medications adjusted this encounter:  Requested Prescriptions     Signed  Prescriptions Disp Refills    mirtazapine (REMERON) 7.5 MG tablet 30 tablet 1     Sig: Take 1 tablet (7.5 mg total) by mouth daily at bedtime     No orders of the defined types were placed in this encounter.    There are no discontinued medications.    PPS: 90%      Larry Jenkins was seen today for symptoms and planning cares related to above illnesses.  Above orders were sent electronically, or provided in hardcopy in clinic.  I have reviewed the patient's controlled substance dispensing history in the Prescription Drug Monitoring Program in compliance with the [place] regulations before prescribing any controlled substances.  PDMP reviewed; no active scripts.     We appreciate the referral, and wish for him to continue to follow with us.  If there are questions or concerns, please contact us through our clinic/answering service 24 hours a day, seven days a week.    Shana Bynum PA-C  [facility]  [phone]    Visit Information    Accompanied By: Accompanied by 2 sons    Source of History: Self, Family member    History Limitations: None     History of Present Illness      Larry Jenkins is a 88 y.o. male who presents as a referral from ILIANA MATOS of primary care for primary palliative diagnosis of Afib with pacemaker.  Consultation is requested for: symptom management, goal of care assessment and decisional support, disease process education and discussion of prognosis, advance care planning, emotional support in the setting of serious illness.    Larry has a PMHx of arthritis, cardiomyopathy, history of mitral valve replacement, A-fib with pacemaker, and CAD S/P CABGx1 in 2001.  He also has a history of reactive depression especially since his wife passed away 2 years ago.  Larry was initially referred to hospice care by his [place] cardiologist as his pacer/ICD was expiring however he elected to have the device replaced because he did not want to lose the  ability to drive.  During his Medicare annual wellness visit he expressed dissatisfaction with his quality of life and continuing to see if doctors and therefore was referred to palliative and supportive care for additional support.    He presents today with both sons for initial consultation 2024.     Pertinent Palliative Care Domains    Physical Symptoms:ambulates    Psychological Symptoms:no anxiety, good insight, depression    Social Aspects: excellent family support    Spiritual Aspects: has visiting        Advance Directive and Living Will:     Yes, has documents; I asked him to email them in or bring them in to be scanned.   Power of :    POLST:   We discussed POLST form today.     Historical Data  Past Medical History:   Diagnosis Date    Amputation of toe (HCC)     3rd toe;  accident    Arthritis      Past Surgical History:   Procedure Laterality Date    CATARACT EXTRACTION, BILATERAL      COLONOSCOPY N/A 11/15/2018    Procedure: COLONOSCOPY;  Surgeon: Daly Celeste MD;  Location:  MAIN OR;  Service: Gastroenterology    CORONARY ARTERY BYPASS GRAFT      ESOPHAGOGASTRODUODENOSCOPY N/A 2018    Procedure: ESOPHAGOGASTRODUODENOSCOPY (EGD);  Surgeon: Zhang Cassidy MD;  Location:  MAIN OR;  Service: Gastroenterology    MITRAL VALVE REPLACEMENT       Social History     Socioeconomic History    Marital status: /Civil Union     Spouse name: Not on file    Number of children: Not on file    Years of education: Not on file    Highest education level: Not on file   Occupational History    Occupation: retired   Tobacco Use    Smoking status: Former     Current packs/day: 0.00     Types: Cigarettes     Quit date: 1985     Years since quittin.2    Smokeless tobacco: Never   Vaping Use    Vaping status: Never Used   Substance and Sexual Activity    Alcohol use: Yes     Comment: occasional    Drug use: No    Sexual activity: Not on file   Other Topics Concern    Not  on file   Social History Narrative    No smoke exposure     Social Determinants of Health     Financial Resource Strain: Low Risk  (12/19/2023)    Overall Financial Resource Strain (CARDIA)     Difficulty of Paying Living Expenses: Not hard at all   Food Insecurity: No Food Insecurity (12/27/2022)    Hunger Vital Sign     Worried About Running Out of Food in the Last Year: Never true     Ran Out of Food in the Last Year: Never true   Transportation Needs: No Transportation Needs (12/19/2023)    PRAPARE - Transportation     Lack of Transportation (Medical): No     Lack of Transportation (Non-Medical): No   Physical Activity: Not on file   Stress: Not on file   Social Connections: Not on file   Intimate Partner Violence: Not on file   Housing Stability: Low Risk  (12/27/2022)    Housing Stability Vital Sign     Unable to Pay for Housing in the Last Year: No     Number of Places Lived in the Last Year: 1     Unstable Housing in the Last Year: No     Family History   Problem Relation Age of Onset    Alzheimer's disease Mother      Allergies   Allergen Reactions    Adhesive [Medical Tape] Rash     Current Outpatient Medications   Medication Sig Dispense Refill    atorvastatin (LIPITOR) 10 mg tablet Take by mouth      benzonatate (TESSALON PERLES) 100 mg capsule Take 1 capsule (100 mg total) by mouth 3 (three) times a day as needed for cough 20 capsule 0    ferrous sulfate 325 (65 Fe) mg tablet Take 325 mg by mouth      furosemide (LASIX) 20 mg tablet Take by mouth      guaiFENesin (MUCINEX) 600 mg 12 hr tablet Take 2 tablets (1,200 mg total) by mouth every 12 (twelve) hours 15 tablet 0    lisinopril (ZESTRIL) 5 mg tablet Take 5 mg by mouth daily      metoprolol tartrate (LOPRESSOR) 25 mg tablet Take 25 mg by mouth every 12 (twelve) hours      mirtazapine (REMERON) 7.5 MG tablet Take 1 tablet (7.5 mg total) by mouth daily at bedtime 30 tablet 1    potassium chloride (K-DUR,KLOR-CON) 20 mEq tablet Take 20 mEq by mouth  daily      warfarin (COUMADIN) 5 mg tablet Take 5 mg by mouth daily       No current facility-administered medications for this visit.       Review of Systems   Constitutional: Positive for decreased appetite. Negative for malaise/fatigue.   HENT:  Negative for congestion and ear discharge.    Musculoskeletal:  Negative for back pain and falls.   Gastrointestinal:  Negative for bloating, abdominal pain, nausea and vomiting.   Psychiatric/Behavioral:  Positive for depression. Negative for altered mental status and substance abuse. The patient has insomnia. The patient is not nervous/anxious.       Vital Signs    /70 (BP Location: Right arm, Patient Position: Sitting, Cuff Size: Standard)   Pulse 80   Temp (!) 96.4 °F (35.8 °C) (Temporal)   Wt 86.5 kg (190 lb 12.8 oz)   SpO2 97%   BMI 28.18 kg/m²     Physical Exam and Objective Data  Physical Exam  Vitals and nursing note reviewed.   Constitutional:       General: He is not in acute distress.     Appearance: He is well-developed. He is ill-appearing (chronically).   HENT:      Head: Normocephalic and atraumatic.   Eyes:      Conjunctiva/sclera: Conjunctivae normal.   Cardiovascular:      Rate and Rhythm: Normal rate.   Pulmonary:      Effort: Pulmonary effort is normal. No respiratory distress.   Musculoskeletal:         General: No swelling.      Cervical back: Neck supple.   Skin:     General: Skin is warm and dry.   Neurological:      Mental Status: He is alert and oriented to person, place, and time. Mental status is at baseline.   Psychiatric:         Mood and Affect: Mood normal.         Behavior: Behavior normal.       Radiology and Laboratory:  I personally reviewed and interpreted the following results: CBC, CMP    60 minutes was spent face to face with Larry Jenkins with greater than 50% of the time spent in counseling or coordination of care including discussions of etiology of diagnosis, pathogenesis of diagnosis, prognosis of diagnosis,  follow up requirements, risk factors and risk reduction of disease, patient and family counseling/involvement in care, and compliance with treatment regimen. reviewed chart, reviewed lab values, reviewed imaging, provided medical updates, discussed palliative care and symptom management, assessed POA/HCA, provided supportive listening, provided anticipatory guidance, provided psychosocial and emotional support, assessed competency and decision-making, and facilitated interdisciplinary communication. All of the patient's questions were answered during this discussion.

## 2024-03-04 PROBLEM — I12.9 HYPERTENSIVE CHRONIC KIDNEY DISEASE: Status: ACTIVE | Noted: 2024-03-04

## 2024-07-11 ENCOUNTER — TELEPHONE (OUTPATIENT)
Age: 89
End: 2024-07-11

## 2024-07-11 NOTE — TELEPHONE ENCOUNTER
Halle from [facility] called and asked if we could please Fax Labs from 6/21/2023 to [phone] thank you.

## 2024-07-11 NOTE — TELEPHONE ENCOUNTER
Halle from [facility] requesting recent labs to be faxed. Fax sent 7/11.

## 2024-10-14 ENCOUNTER — OFFICE VISIT (OUTPATIENT)
Dept: FAMILY MEDICINE CLINIC | Facility: CLINIC | Age: 89
End: 2024-10-14
Payer: MEDICARE

## 2024-10-14 VITALS
WEIGHT: 183 LBS | OXYGEN SATURATION: 96 % | SYSTOLIC BLOOD PRESSURE: 126 MMHG | BODY MASS INDEX: 27.02 KG/M2 | HEART RATE: 77 BPM | DIASTOLIC BLOOD PRESSURE: 80 MMHG

## 2024-10-14 DIAGNOSIS — Z23 ENCOUNTER FOR IMMUNIZATION: ICD-10-CM

## 2024-10-14 DIAGNOSIS — T14.8XXA BRUISING: ICD-10-CM

## 2024-10-14 DIAGNOSIS — E78.2 MIXED HYPERLIPIDEMIA: ICD-10-CM

## 2024-10-14 DIAGNOSIS — F33.9 DEPRESSION, RECURRENT (HCC): ICD-10-CM

## 2024-10-14 DIAGNOSIS — Z51.5 PALLIATIVE CARE PATIENT: ICD-10-CM

## 2024-10-14 DIAGNOSIS — R06.02 SOB (SHORTNESS OF BREATH): ICD-10-CM

## 2024-10-14 DIAGNOSIS — R06.09 DOE (DYSPNEA ON EXERTION): Primary | ICD-10-CM

## 2024-10-14 DIAGNOSIS — I10 ESSENTIAL HYPERTENSION: ICD-10-CM

## 2024-10-14 PROCEDURE — 99214 OFFICE O/P EST MOD 30 MIN: CPT

## 2024-10-14 PROCEDURE — G0008 ADMIN INFLUENZA VIRUS VAC: HCPCS

## 2024-10-14 PROCEDURE — 90662 IIV NO PRSV INCREASED AG IM: CPT

## 2024-10-14 RX ORDER — ALBUTEROL SULFATE 90 UG/1
2 INHALANT RESPIRATORY (INHALATION) EVERY 6 HOURS PRN
Qty: 6.7 G | Refills: 2 | Status: SHIPPED | OUTPATIENT
Start: 2024-10-14

## 2024-10-14 NOTE — ASSESSMENT & PLAN NOTE
Patient with chronic depression since the passing of his wife 3 years ago. Denies SI but states that he is ready to die. Reports lack of appetite and some weight loss. States that as he only cooks for himself, he rotates between 5 different things. Reports that this has become unappetizing. Patient declined discussing services such as meals on wheels.

## 2024-10-14 NOTE — PROGRESS NOTES
Ambulatory Visit  Name: Larry Jenkins      : 1935      MRN: [id number]  Encounter Provider: SHAWNA Fisher  Encounter Date: 10/14/2024   Encounter department: [facility]    Assessment & Plan  Encounter for immunization    Orders:    influenza vaccine, high-dose, PF 0.5 mL (Fluzone High Dose)    SOB (shortness of breath)  See DELGADILLO note.   Orders:    CBC and differential; Future    Comprehensive metabolic panel; Future    B-Type Natriuretic Peptide(BNP); Future    XR chest pa and lateral; Future    albuterol (ProAir HFA) 90 mcg/act inhaler; Inhale 2 puffs every 6 (six) hours as needed for wheezing or shortness of breath    DELGADILLO (dyspnea on exertion)  Reports worsening DELGADILLO for aprox 1 year although SOB does also occur at rest. Patient states that he had a cardiologist appt 24. Per [facility] cards consult notes, EKG showed paced rhythm-- patient declined further testing, imaging. Discussed CXR, labs, echo, PFTs. Patient agreeable to cxr and labs. Will discuss with cards if he decides to get echo per patient and son. Patient does admit to intermittent wheezing. Trial albuterol inhaler.   Orders:    CBC and differential; Future    Comprehensive metabolic panel; Future    B-Type Natriuretic Peptide(BNP); Future    XR chest pa and lateral; Future    albuterol (ProAir HFA) 90 mcg/act inhaler; Inhale 2 puffs every 6 (six) hours as needed for wheezing or shortness of breath    Mixed hyperlipidemia  Last year refused routine labs. Agreeable to routine labs. Trend lipid panel.     Orders:    Lipid Panel with Direct LDL reflex; Future    Bruising  Bruise under left eye. Denies fall. States that he was rubbing area and when he went to the restroom, noticed bruising under eye. +coumadin--- follows with [facility] cards. Last INR per son was 3.1.        Essential hypertension  BPs well managed. Continue current medication regimen.         Palliative care patient  Patient reports that he  went to palliative care x1 but did not find in helpful. States that he cancelled his following appt and does not plan to proceed with palliative services.        Depression, recurrent (HCC)  Patient with chronic depression since the passing of his wife 3 years ago. Denies SI but states that he is ready to die. Reports lack of appetite and some weight loss. States that as he only cooks for himself, he rotates between 5 different things. Reports that this has become unappetizing. Patient declined discussing services such as meals on wheels.            Depression Screening and Follow-up Plan: Patient was screened for depression during today's encounter. They screened negative with a PHQ-9 score of 1.      History of Present Illness     Shortness of Breath  The current episode started more than 1 year ago. The problem has been gradually worsening since onset. Associated symptoms include coughing, fatigue and wheezing. Pertinent negatives include no chest pain, chest pressure, dizziness, hoarseness of voice, leg swelling, orthopnea, palpitations, rhinorrhea, sore throat, stridor or sweats. The symptoms are aggravated by activity. There was no intake of a foreign body. He has had no prior steroid use. Past treatments include nothing.   Cough  This is a new problem. The current episode started more than 1 year ago. The problem has been gradually worsening. The cough is Non-productive. Associated symptoms include shortness of breath and wheezing. Pertinent negatives include no chest pain, chills, ear congestion, ear pain, fever, headaches, heartburn, hemoptysis, myalgias, nasal congestion, postnasal drip, rash, rhinorrhea, sore throat, sweats or weight loss. Exacerbated by: activity. He has tried nothing for the symptoms.       History obtained from : patient and son  Review of Systems   Constitutional:  Positive for fatigue. Negative for activity change, chills, fever and weight loss.   HENT:  Negative for congestion, ear  pain, hoarse voice, postnasal drip, rhinorrhea and sore throat.    Eyes:  Negative for pain.   Respiratory:  Positive for cough, shortness of breath and wheezing. Negative for hemoptysis and stridor.    Cardiovascular:  Negative for chest pain, palpitations, orthopnea and leg swelling.   Gastrointestinal:  Negative for abdominal pain, diarrhea, heartburn, nausea and vomiting.   Musculoskeletal:  Negative for arthralgias and myalgias.   Skin:  Negative for rash.   Neurological:  Negative for dizziness, weakness, numbness and headaches.   Hematological:  Bruises/bleeds easily.   Psychiatric/Behavioral:  Positive for dysphoric mood. Negative for suicidal ideas. The patient is not nervous/anxious.    All other systems reviewed and are negative.    Medical History Reviewed by provider this encounter:  Tobacco  Allergies  Meds  Problems  Med Hx  Surg Hx  Fam Hx       Current Outpatient Medications on File Prior to Visit   Medication Sig Dispense Refill    atorvastatin (LIPITOR) 10 mg tablet Take by mouth      ferrous sulfate 325 (65 Fe) mg tablet Take 325 mg by mouth      furosemide (LASIX) 20 mg tablet Take by mouth      lisinopril (ZESTRIL) 5 mg tablet Take 5 mg by mouth daily      metoprolol tartrate (LOPRESSOR) 25 mg tablet Take 25 mg by mouth every 12 (twelve) hours      mirtazapine (REMERON) 7.5 MG tablet Take 1 tablet (7.5 mg total) by mouth daily at bedtime 30 tablet 1    potassium chloride (K-DUR,KLOR-CON) 20 mEq tablet Take 20 mEq by mouth daily      warfarin (COUMADIN) 5 mg tablet Take 5 mg by mouth daily      [DISCONTINUED] benzonatate (TESSALON PERLES) 100 mg capsule Take 1 capsule (100 mg total) by mouth 3 (three) times a day as needed for cough (Patient not taking: Reported on 10/14/2024) 20 capsule 0    [DISCONTINUED] guaiFENesin (MUCINEX) 600 mg 12 hr tablet Take 2 tablets (1,200 mg total) by mouth every 12 (twelve) hours (Patient not taking: Reported on 10/14/2024) 15 tablet 0     No current  facility-administered medications on file prior to visit.          Objective     /80 (BP Location: Left arm, Patient Position: Sitting, Cuff Size: Standard)   Pulse 77   Wt 83 kg (183 lb)   SpO2 96%   BMI 27.02 kg/m²     Physical Exam  Vitals and nursing note reviewed.   Constitutional:       General: He is not in acute distress.     Appearance: He is well-developed.   HENT:      Head: Normocephalic and atraumatic.   Eyes:      Conjunctiva/sclera: Conjunctivae normal.   Cardiovascular:      Rate and Rhythm: Normal rate and regular rhythm.      Heart sounds: No murmur heard.  Pulmonary:      Effort: Pulmonary effort is normal. No respiratory distress.      Breath sounds: Normal breath sounds. No decreased breath sounds or wheezing.   Chest:      Chest wall: No tenderness.   Abdominal:      General: Bowel sounds are normal.      Palpations: Abdomen is soft.      Tenderness: There is no abdominal tenderness.   Musculoskeletal:         General: No swelling.      Cervical back: Neck supple.      Right lower leg: No edema.      Left lower leg: No edema.   Skin:     General: Skin is warm and dry.      Capillary Refill: Capillary refill takes less than 2 seconds.      Coloration: Skin is pale.   Neurological:      Mental Status: He is alert and oriented to person, place, and time.   Psychiatric:         Attention and Perception: Attention and perception normal.         Mood and Affect: Affect normal. Mood is depressed. Mood is not anxious.         Speech: Speech normal.         Behavior: Behavior normal. Behavior is cooperative.         Thought Content: Thought content normal. Thought content does not include suicidal ideation. Thought content does not include suicidal plan.       Administrative Statements   I have spent a total time of 30 minutes in caring for this patient on the day of the visit/encounter including Risks and benefits of tx options, Instructions for management, Patient and family education,  Importance of tx compliance, Risk factor reductions, Impressions, Documenting in the medical record, Reviewing / ordering tests, medicine, procedures  , and Obtaining or reviewing history  .

## 2024-10-14 NOTE — ASSESSMENT & PLAN NOTE
Patient reports that he went to palliative care x1 but did not find in helpful. States that he cancelled his following appt and does not plan to proceed with palliative services.

## 2024-10-14 NOTE — ASSESSMENT & PLAN NOTE
Last year refused routine labs. Agreeable to routine labs. Trend lipid panel.     Orders:    Lipid Panel with Direct LDL reflex; Future

## 2024-10-16 ENCOUNTER — HOSPITAL ENCOUNTER (OUTPATIENT)
Dept: RADIOLOGY | Facility: HOSPITAL | Age: 89
Discharge: HOME | End: 2024-10-16
Payer: MEDICARE

## 2024-10-16 ENCOUNTER — APPOINTMENT (OUTPATIENT)
Dept: LAB | Facility: HOSPITAL | Age: 89
End: 2024-10-16
Payer: MEDICARE

## 2024-10-16 DIAGNOSIS — R06.09 DOE (DYSPNEA ON EXERTION): ICD-10-CM

## 2024-10-16 DIAGNOSIS — R06.02 SOB (SHORTNESS OF BREATH): ICD-10-CM

## 2024-10-16 DIAGNOSIS — E78.2 MIXED HYPERLIPIDEMIA: ICD-10-CM

## 2024-10-16 LAB
ALBUMIN SERPL BCG-MCNC: 4.4 G/DL (ref 3.5–5)
ALP SERPL-CCNC: 52 U/L (ref 34–104)
ALT SERPL W P-5'-P-CCNC: 10 U/L (ref 7–52)
ANION GAP SERPL CALCULATED.3IONS-SCNC: 8 MMOL/L (ref 4–13)
AST SERPL W P-5'-P-CCNC: 21 U/L (ref 13–39)
BASOPHILS # BLD AUTO: 0.05 THOUSANDS/ΜL (ref 0–0.1)
BASOPHILS NFR BLD AUTO: 1 % (ref 0–1)
BILIRUB SERPL-MCNC: 0.89 MG/DL (ref 0.2–1)
BNP SERPL-MCNC: 2844 PG/ML (ref 0–100)
BUN SERPL-MCNC: 29 MG/DL (ref 5–25)
CALCIUM SERPL-MCNC: 9.1 MG/DL (ref 8.4–10.2)
CHLORIDE SERPL-SCNC: 105 MMOL/L (ref 96–108)
CHOLEST SERPL-MCNC: 105 MG/DL
CO2 SERPL-SCNC: 27 MMOL/L (ref 21–32)
CREAT SERPL-MCNC: 1.04 MG/DL (ref 0.6–1.3)
EOSINOPHIL # BLD AUTO: 0.17 THOUSAND/ΜL (ref 0–0.61)
EOSINOPHIL NFR BLD AUTO: 3 % (ref 0–6)
ERYTHROCYTE [DISTWIDTH] IN BLOOD BY AUTOMATED COUNT: 16.1 % (ref 11.6–15.1)
GFR SERPL CREATININE-BSD FRML MDRD: 63 ML/MIN/1.73SQ M
GLUCOSE P FAST SERPL-MCNC: 105 MG/DL (ref 65–99)
HCT VFR BLD AUTO: 42.1 % (ref 36.5–49.3)
HDLC SERPL-MCNC: 42 MG/DL
HGB BLD-MCNC: 12.6 G/DL (ref 12–17)
IMM GRANULOCYTES # BLD AUTO: 0.02 THOUSAND/UL (ref 0–0.2)
IMM GRANULOCYTES NFR BLD AUTO: 0 % (ref 0–2)
LDLC SERPL CALC-MCNC: 47 MG/DL (ref 0–100)
LYMPHOCYTES # BLD AUTO: 1.05 THOUSANDS/ΜL (ref 0.6–4.47)
LYMPHOCYTES NFR BLD AUTO: 16 % (ref 14–44)
MCH RBC QN AUTO: 27.3 PG (ref 26.8–34.3)
MCHC RBC AUTO-ENTMCNC: 29.9 G/DL (ref 31.4–37.4)
MCV RBC AUTO: 91 FL (ref 82–98)
MONOCYTES # BLD AUTO: 0.88 THOUSAND/ΜL (ref 0.17–1.22)
MONOCYTES NFR BLD AUTO: 13 % (ref 4–12)
NEUTROPHILS # BLD AUTO: 4.51 THOUSANDS/ΜL (ref 1.85–7.62)
NEUTS SEG NFR BLD AUTO: 67 % (ref 43–75)
NRBC BLD AUTO-RTO: 0 /100 WBCS
PLATELET # BLD AUTO: 171 THOUSANDS/UL (ref 149–390)
PMV BLD AUTO: 12.3 FL (ref 8.9–12.7)
POTASSIUM SERPL-SCNC: 4.3 MMOL/L (ref 3.5–5.3)
PROT SERPL-MCNC: 7.3 G/DL (ref 6.4–8.4)
RBC # BLD AUTO: 4.61 MILLION/UL (ref 3.88–5.62)
SODIUM SERPL-SCNC: 140 MMOL/L (ref 135–147)
TRIGL SERPL-MCNC: 80 MG/DL
WBC # BLD AUTO: 6.68 THOUSAND/UL (ref 4.31–10.16)

## 2024-10-16 PROCEDURE — 85025 COMPLETE CBC W/AUTO DIFF WBC: CPT

## 2024-10-16 PROCEDURE — 71046 X-RAY EXAM CHEST 2 VIEWS: CPT

## 2024-10-16 PROCEDURE — 83880 ASSAY OF NATRIURETIC PEPTIDE: CPT

## 2024-10-16 PROCEDURE — 80053 COMPREHEN METABOLIC PANEL: CPT

## 2024-10-16 PROCEDURE — 36415 COLL VENOUS BLD VENIPUNCTURE: CPT

## 2024-10-16 PROCEDURE — 80061 LIPID PANEL: CPT

## 2024-10-17 ENCOUNTER — TELEPHONE (OUTPATIENT)
Dept: FAMILY MEDICINE CLINIC | Facility: CLINIC | Age: 89
End: 2024-10-17

## 2024-10-17 NOTE — TELEPHONE ENCOUNTER
Spoke with patient's son Bigg to report elevated BNP. Patient's cardiologist office was contacted this morning and they requested labs be faxed. Labs were faxed. Son is going to call Dr. Ayala, patient's cardiologist. Son states that he will contact us once he speaks with cardiology.

## 2024-10-17 NOTE — TELEPHONE ENCOUNTER
Spoke with pt.'s cardiology office, [facility], to ask if they would like us to fax over his lab results to advise him on BNP levels. They gave me their fax number to send it over. The documents were faxed over this morning.

## 2025-02-14 ENCOUNTER — TELEPHONE (OUTPATIENT)
Dept: FAMILY MEDICINE CLINIC | Facility: CLINIC | Age: OVER 89
End: 2025-02-14

## 2025-02-27 ENCOUNTER — HOSPITAL ENCOUNTER (INPATIENT)
Facility: HOSPITAL | Age: OVER 89
LOS: 1 days | Discharge: HOME | End: 2025-02-28
Attending: EMERGENCY MEDICINE | Admitting: INTERNAL MEDICINE
Payer: MEDICARE

## 2025-02-27 ENCOUNTER — APPOINTMENT (EMERGENCY)
Dept: RADIOLOGY | Facility: HOSPITAL | Age: OVER 89
End: 2025-02-27
Payer: MEDICARE

## 2025-02-27 DIAGNOSIS — I48.20 CHRONIC A-FIB (HCC): ICD-10-CM

## 2025-02-27 DIAGNOSIS — I48.0 PAROXYSMAL ATRIAL FIBRILLATION (HCC): ICD-10-CM

## 2025-02-27 DIAGNOSIS — R79.89 ELEVATED TROPONIN: ICD-10-CM

## 2025-02-27 DIAGNOSIS — I50.9 CHF (CONGESTIVE HEART FAILURE) (HCC): Primary | ICD-10-CM

## 2025-02-27 DIAGNOSIS — R07.89 CHEST TIGHTNESS: ICD-10-CM

## 2025-02-27 DIAGNOSIS — Z71.89 ADVANCED CARE PLANNING/COUNSELING DISCUSSION: ICD-10-CM

## 2025-02-27 PROBLEM — Z95.2 H/O MITRAL VALVE REPLACEMENT: Status: ACTIVE | Noted: 2025-02-27

## 2025-02-27 PROBLEM — I50.20 SYSTOLIC CONGESTIVE HEART FAILURE (HCC): Status: ACTIVE | Noted: 2025-02-27

## 2025-02-27 PROBLEM — R06.02 SOB (SHORTNESS OF BREATH): Status: ACTIVE | Noted: 2025-02-27

## 2025-02-27 PROBLEM — I50.22 CHRONIC SYSTOLIC HEART FAILURE (HCC): Status: ACTIVE | Noted: 2025-02-27

## 2025-02-27 LAB
2HR DELTA HS TROPONIN: 22 NG/L
4HR DELTA HS TROPONIN: 35 NG/L
ABO GROUP BLD: NORMAL
ALBUMIN SERPL BCG-MCNC: 4.4 G/DL (ref 3.5–5)
ALP SERPL-CCNC: 57 U/L (ref 34–104)
ALT SERPL W P-5'-P-CCNC: 19 U/L (ref 7–52)
ANION GAP SERPL CALCULATED.3IONS-SCNC: 8 MMOL/L (ref 4–13)
APTT PPP: 40 SECONDS (ref 23–34)
AST SERPL W P-5'-P-CCNC: 33 U/L (ref 13–39)
ATRIAL RATE: 75 BPM
BACTERIA UR QL AUTO: ABNORMAL /HPF
BASOPHILS # BLD AUTO: 0.04 THOUSANDS/ÂΜL (ref 0–0.1)
BASOPHILS NFR BLD AUTO: 1 % (ref 0–1)
BILIRUB SERPL-MCNC: 0.99 MG/DL (ref 0.2–1)
BILIRUB UR QL STRIP: NEGATIVE
BLD GP AB SCN SERPL QL: NEGATIVE
BNP SERPL-MCNC: 2776 PG/ML (ref 0–100)
BUN SERPL-MCNC: 27 MG/DL (ref 5–25)
CALCIUM SERPL-MCNC: 9.1 MG/DL (ref 8.4–10.2)
CARDIAC TROPONIN I PNL SERPL HS: 548 NG/L (ref ?–50)
CARDIAC TROPONIN I PNL SERPL HS: 570 NG/L (ref ?–50)
CARDIAC TROPONIN I PNL SERPL HS: 583 NG/L (ref ?–50)
CHLORIDE SERPL-SCNC: 107 MMOL/L (ref 96–108)
CLARITY UR: ABNORMAL
CO2 SERPL-SCNC: 28 MMOL/L (ref 21–32)
COLOR UR: ABNORMAL
CREAT SERPL-MCNC: 1.26 MG/DL (ref 0.6–1.3)
D DIMER PPP FEU-MCNC: 0.29 UG/ML FEU
EOSINOPHIL # BLD AUTO: 0.1 THOUSAND/ÂΜL (ref 0–0.61)
EOSINOPHIL NFR BLD AUTO: 2 % (ref 0–6)
ERYTHROCYTE [DISTWIDTH] IN BLOOD BY AUTOMATED COUNT: 14.6 % (ref 11.6–15.1)
FLUAV RNA RESP QL NAA+PROBE: NEGATIVE
FLUBV RNA RESP QL NAA+PROBE: NEGATIVE
GFR SERPL CREATININE-BSD FRML MDRD: 50 ML/MIN/1.73SQ M
GLUCOSE SERPL-MCNC: 150 MG/DL (ref 65–140)
GLUCOSE UR STRIP-MCNC: NEGATIVE MG/DL
HCT VFR BLD AUTO: 41.4 % (ref 36.5–49.3)
HGB BLD-MCNC: 12.7 G/DL (ref 12–17)
HGB UR QL STRIP.AUTO: ABNORMAL
IMM GRANULOCYTES # BLD AUTO: 0.03 THOUSAND/UL (ref 0–0.2)
IMM GRANULOCYTES NFR BLD AUTO: 1 % (ref 0–2)
INR PPP: 3.13 (ref 0.85–1.19)
KETONES UR STRIP-MCNC: NEGATIVE MG/DL
LEUKOCYTE ESTERASE UR QL STRIP: NEGATIVE
LYMPHOCYTES # BLD AUTO: 1.35 THOUSANDS/ÂΜL (ref 0.6–4.47)
LYMPHOCYTES NFR BLD AUTO: 23 % (ref 14–44)
MAGNESIUM SERPL-MCNC: 2.4 MG/DL (ref 1.9–2.7)
MCH RBC QN AUTO: 28.9 PG (ref 26.8–34.3)
MCHC RBC AUTO-ENTMCNC: 30.7 G/DL (ref 31.4–37.4)
MCV RBC AUTO: 94 FL (ref 82–98)
MONOCYTES # BLD AUTO: 0.72 THOUSAND/ÂΜL (ref 0.17–1.22)
MONOCYTES NFR BLD AUTO: 12 % (ref 4–12)
NEUTROPHILS # BLD AUTO: 3.75 THOUSANDS/ÂΜL (ref 1.85–7.62)
NEUTS SEG NFR BLD AUTO: 61 % (ref 43–75)
NITRITE UR QL STRIP: NEGATIVE
NON-SQ EPI CELLS URNS QL MICRO: ABNORMAL /HPF
NRBC BLD AUTO-RTO: 0 /100 WBCS
PH UR STRIP.AUTO: 6 [PH]
PLATELET # BLD AUTO: 161 THOUSANDS/UL (ref 149–390)
PMV BLD AUTO: 12.2 FL (ref 8.9–12.7)
POTASSIUM SERPL-SCNC: 4.8 MMOL/L (ref 3.5–5.3)
PROT SERPL-MCNC: 7.3 G/DL (ref 6.4–8.4)
PROT UR STRIP-MCNC: NEGATIVE MG/DL
PROTHROMBIN TIME: 32.3 SECONDS (ref 12.3–15)
QRS AXIS: -81 DEGREES
QRSD INTERVAL: 162 MS
QT INTERVAL: 478 MS
QTC INTERVAL: 548 MS
RBC # BLD AUTO: 4.4 MILLION/UL (ref 3.88–5.62)
RBC #/AREA URNS AUTO: ABNORMAL /HPF
RH BLD: POSITIVE
RSV RNA RESP QL NAA+PROBE: NEGATIVE
SARS-COV-2 RNA RESP QL NAA+PROBE: NEGATIVE
SODIUM SERPL-SCNC: 143 MMOL/L (ref 135–147)
SP GR UR STRIP.AUTO: <1.005 (ref 1–1.03)
SPECIMEN EXPIRATION DATE: NORMAL
T WAVE AXIS: 99 DEGREES
UROBILINOGEN UR STRIP-ACNC: <2 MG/DL
VENTRICULAR RATE: 79 BPM
WBC # BLD AUTO: 5.99 THOUSAND/UL (ref 4.31–10.16)
WBC #/AREA URNS AUTO: ABNORMAL /HPF

## 2025-02-27 PROCEDURE — 80053 COMPREHEN METABOLIC PANEL: CPT | Performed by: EMERGENCY MEDICINE

## 2025-02-27 PROCEDURE — 93010 ELECTROCARDIOGRAM REPORT: CPT | Performed by: INTERNAL MEDICINE

## 2025-02-27 PROCEDURE — 99285 EMERGENCY DEPT VISIT HI MDM: CPT | Performed by: EMERGENCY MEDICINE

## 2025-02-27 PROCEDURE — 85730 THROMBOPLASTIN TIME PARTIAL: CPT | Performed by: EMERGENCY MEDICINE

## 2025-02-27 PROCEDURE — 85025 COMPLETE CBC W/AUTO DIFF WBC: CPT | Performed by: EMERGENCY MEDICINE

## 2025-02-27 PROCEDURE — 84484 ASSAY OF TROPONIN QUANT: CPT | Performed by: EMERGENCY MEDICINE

## 2025-02-27 PROCEDURE — 71045 X-RAY EXAM CHEST 1 VIEW: CPT

## 2025-02-27 PROCEDURE — 85379 FIBRIN DEGRADATION QUANT: CPT | Performed by: EMERGENCY MEDICINE

## 2025-02-27 PROCEDURE — 36415 COLL VENOUS BLD VENIPUNCTURE: CPT | Performed by: EMERGENCY MEDICINE

## 2025-02-27 PROCEDURE — 81001 URINALYSIS AUTO W/SCOPE: CPT | Performed by: INTERNAL MEDICINE

## 2025-02-27 PROCEDURE — 86850 RBC ANTIBODY SCREEN: CPT | Performed by: EMERGENCY MEDICINE

## 2025-02-27 PROCEDURE — 85610 PROTHROMBIN TIME: CPT | Performed by: EMERGENCY MEDICINE

## 2025-02-27 PROCEDURE — 86901 BLOOD TYPING SEROLOGIC RH(D): CPT | Performed by: EMERGENCY MEDICINE

## 2025-02-27 PROCEDURE — 93005 ELECTROCARDIOGRAM TRACING: CPT

## 2025-02-27 PROCEDURE — 83735 ASSAY OF MAGNESIUM: CPT | Performed by: EMERGENCY MEDICINE

## 2025-02-27 PROCEDURE — 99223 1ST HOSP IP/OBS HIGH 75: CPT | Performed by: INTERNAL MEDICINE

## 2025-02-27 PROCEDURE — 84484 ASSAY OF TROPONIN QUANT: CPT | Performed by: INTERNAL MEDICINE

## 2025-02-27 PROCEDURE — 83880 ASSAY OF NATRIURETIC PEPTIDE: CPT | Performed by: EMERGENCY MEDICINE

## 2025-02-27 PROCEDURE — 86900 BLOOD TYPING SEROLOGIC ABO: CPT | Performed by: EMERGENCY MEDICINE

## 2025-02-27 PROCEDURE — 0241U HB NFCT DS VIR RESP RNA 4 TRGT: CPT | Performed by: EMERGENCY MEDICINE

## 2025-02-27 PROCEDURE — 99285 EMERGENCY DEPT VISIT HI MDM: CPT

## 2025-02-27 RX ORDER — FUROSEMIDE 10 MG/ML
40 INJECTION INTRAMUSCULAR; INTRAVENOUS ONCE
Status: COMPLETED | OUTPATIENT
Start: 2025-02-27 | End: 2025-02-27

## 2025-02-27 RX ORDER — ISOSORBIDE MONONITRATE 30 MG/1
30 TABLET, EXTENDED RELEASE ORAL DAILY
Status: DISCONTINUED | OUTPATIENT
Start: 2025-02-27 | End: 2025-02-28 | Stop reason: HOSPADM

## 2025-02-27 RX ORDER — ATORVASTATIN CALCIUM 10 MG/1
10 TABLET, FILM COATED ORAL
Status: DISCONTINUED | OUTPATIENT
Start: 2025-02-27 | End: 2025-02-28 | Stop reason: HOSPADM

## 2025-02-27 RX ORDER — FERROUS SULFATE 325(65) MG
325 TABLET ORAL
Status: DISCONTINUED | OUTPATIENT
Start: 2025-02-28 | End: 2025-02-28 | Stop reason: HOSPADM

## 2025-02-27 RX ORDER — LISINOPRIL 5 MG/1
5 TABLET ORAL DAILY
Status: DISCONTINUED | OUTPATIENT
Start: 2025-02-28 | End: 2025-02-28 | Stop reason: HOSPADM

## 2025-02-27 RX ORDER — POTASSIUM CHLORIDE 1500 MG/1
20 TABLET, EXTENDED RELEASE ORAL DAILY
Status: DISCONTINUED | OUTPATIENT
Start: 2025-02-28 | End: 2025-02-28 | Stop reason: HOSPADM

## 2025-02-27 RX ORDER — ALBUTEROL SULFATE 90 UG/1
2 INHALANT RESPIRATORY (INHALATION) EVERY 6 HOURS PRN
Status: DISCONTINUED | OUTPATIENT
Start: 2025-02-27 | End: 2025-02-28 | Stop reason: HOSPADM

## 2025-02-27 RX ORDER — WARFARIN SODIUM 5 MG/1
5 TABLET ORAL
Status: DISCONTINUED | OUTPATIENT
Start: 2025-02-27 | End: 2025-02-28

## 2025-02-27 RX ORDER — BACITRACIN, NEOMYCIN, POLYMYXIN B 400; 3.5; 5 [USP'U]/G; MG/G; [USP'U]/G
1 OINTMENT TOPICAL 2 TIMES DAILY
Status: DISCONTINUED | OUTPATIENT
Start: 2025-02-27 | End: 2025-02-28 | Stop reason: HOSPADM

## 2025-02-27 RX ORDER — FUROSEMIDE 20 MG/1
20 TABLET ORAL DAILY
Status: DISCONTINUED | OUTPATIENT
Start: 2025-02-28 | End: 2025-02-28 | Stop reason: HOSPADM

## 2025-02-27 RX ORDER — TETRAHYDROZOLINE HCL 0.05 %
1 DROPS OPHTHALMIC (EYE) EVERY MORNING
Status: DISCONTINUED | OUTPATIENT
Start: 2025-02-28 | End: 2025-02-28 | Stop reason: HOSPADM

## 2025-02-27 RX ORDER — RANOLAZINE 500 MG/1
500 TABLET, EXTENDED RELEASE ORAL EVERY 12 HOURS SCHEDULED
Status: DISCONTINUED | OUTPATIENT
Start: 2025-02-27 | End: 2025-02-28 | Stop reason: HOSPADM

## 2025-02-27 RX ORDER — METOPROLOL TARTRATE 25 MG/1
25 TABLET, FILM COATED ORAL EVERY 12 HOURS SCHEDULED
Status: DISCONTINUED | OUTPATIENT
Start: 2025-02-27 | End: 2025-02-28 | Stop reason: HOSPADM

## 2025-02-27 RX ORDER — ACETAMINOPHEN 325 MG/1
650 TABLET ORAL EVERY 6 HOURS PRN
Status: DISCONTINUED | OUTPATIENT
Start: 2025-02-27 | End: 2025-02-28 | Stop reason: HOSPADM

## 2025-02-27 RX ORDER — TETRAHYDROZOLINE HCL 0.05 %
1 DROPS OPHTHALMIC (EYE) EVERY MORNING
COMMUNITY

## 2025-02-27 RX ORDER — SODIUM CHLORIDE 9 MG/ML
3 INJECTION INTRAVENOUS
Status: DISCONTINUED | OUTPATIENT
Start: 2025-02-27 | End: 2025-02-28 | Stop reason: HOSPADM

## 2025-02-27 RX ADMIN — RANOLAZINE 500 MG: 500 TABLET, EXTENDED RELEASE ORAL at 20:50

## 2025-02-27 RX ADMIN — BACITRACIN ZINC, NEOMYCIN, POLYMYXIN B SULFAT 1 SMALL APPLICATION: 5000; 3.5; 4 OINTMENT TOPICAL at 23:51

## 2025-02-27 RX ADMIN — WARFARIN SODIUM 5 MG: 5 TABLET ORAL at 18:56

## 2025-02-27 RX ADMIN — ISOSORBIDE MONONITRATE 30 MG: 30 TABLET, EXTENDED RELEASE ORAL at 18:56

## 2025-02-27 RX ADMIN — ATORVASTATIN CALCIUM 10 MG: 10 TABLET, FILM COATED ORAL at 18:56

## 2025-02-27 RX ADMIN — FUROSEMIDE 40 MG: 10 INJECTION, SOLUTION INTRAVENOUS at 14:39

## 2025-02-27 RX ADMIN — METOPROLOL TARTRATE 25 MG: 25 TABLET, FILM COATED ORAL at 20:50

## 2025-02-27 NOTE — ASSESSMENT & PLAN NOTE
Wt Readings from Last 3 Encounters:   02/27/25 79.8 kg (176 lb)   10/14/24 83 kg (183 lb)   02/07/24 86.5 kg (190 lb 12.8 oz)     History of CABG about 20 some years ago, follows with cardiology [place]  Echo 2018 showed LVEF 40 to 45%.  Patient on Lasix 20 mg daily, lisinopril 5 mg and metoprolol.  BNP 2.776 (was 2.844 in October 2024 which might be his baseline)  X-ray without acute pathology, physical exam normal without fluid overload  Received 40 IV Lasix in the emergency department  Hold any further IV Lasix.  Patient is not in CHF exacerbation.  Resume home medication starting tomorrow

## 2025-02-27 NOTE — ASSESSMENT & PLAN NOTE
Blood pressure reviewed  Continue lisinopril 5 mg daily, metoprolol 25 twice daily Lasix oral daily  Added Imdur  Monitor blood pressure

## 2025-02-27 NOTE — H&P
H&P - Hospitalist   Name: Larry Jenkins 89 y.o. male I MRN: [id number]  Unit/Bed#: [facility] 01 I Date of Admission: 2/27/2025   Date of Service: 2/27/2025 I Hospital Day: 0     Assessment & Plan  SOB (shortness of breath)  Shortness of breath on exertion that is going on for a few weeks now.  Chest x-ray without acute findings, flu/COVID/RSV negative  Unclear etiology, might be related to heart disease.  At room air saturating well, in no distress.  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  Patient on Lasix 20 mg daily, lisinopril 5 mg and metoprolol.  BNP 2.776 (was 2.844 in October 2024 which might be his baseline)  X-ray without acute pathology, physical exam normal without fluid overload  Received 40 IV Lasix in the emergency department  Hold any further IV Lasix.  Patient is not in CHF exacerbation.  Resume home medication starting tomorrow        Elevated troponin  Lab Results   Component Value Date    HSTNI0 548 (H) 02/27/2025    HSTNI2 570 (H) 02/27/2025    HSTNID2 22 (H) 02/27/2025    HSTNI4 124 (H) 12/25/2022    HSTNID4 28 (H) 12/25/2022      Patient reported chest pain on and off for a few weeks.  Last night he started to have retrosternal pressure 5 out of 10 without radiation that lasted all night until this morning.  EKG with paced rhythm  Might be demand  ischemia  Discussed with patient and son at bedside.  Patient reported he does not want any intervention done also he declined echo.  Stated echo was offered by his cardiology in the past also but he refused.  Stated he lived enough and we will did not do anything aggressive.  He is agreeable with oral medication/IV as needed (conservative management).   Continue aspirin, atorvastatin, lisinopril, metoprolol.  Will start Imdur 30 mg daily and Ranexa 500 mg every 12 hours to help with his anginal symptoms  Monitor  H/O mitral valve replacement  More than 20 years ago  On warfarin 5 mg daily except Sunday when he takes 2.5 mg  INR 3.1 today at goal (goal 2.5-3.5)  Continue warfarin and monitor INR a.m.      VTE Pharmacologic Prophylaxis: VTE Score: 4 Moderate Risk (Score 3-4) - Pharmacological DVT Prophylaxis Ordered: warfarin (Coumadin).  Code Status: Level 3 - DNAR and DNI   Discussion with family: Updated  (son) at bedside.    Anticipated Length of Stay: Patient will be admitted on an inpatient basis with an anticipated length of stay of greater than 2 midnights secondary to elevated troponin .    History of Present Illness   Chief Complaint: chest pressure     Larry Jenkins is a 89 y.o. male with a PMH of CABG x 1, CHF with reduced ejection fraction, status post mitral valve replacement on warfarin, PFO closure, status post pacemaker implant,  who presents with chest heaviness.  Patient reported shortness of breath with exertion (walking short distances) that is going on for a few weeks now along with intermittent chest heaviness.  Last night around 9 PM he started to have retrosternal pressure 5 out of 10 that lasted all night, without radiation.  Presented to emergency department today.    On presentation patient with stable vital signs was noted to have elevated BNP and troponins.  Was admitted for further management..    Review of Systems   Constitutional:  Negative for chills and fever.    Respiratory:  Positive for cough and shortness of breath.    Cardiovascular:  Positive for chest pain. Negative for leg swelling.   Gastrointestinal:  Negative for abdominal pain, constipation, diarrhea, nausea and vomiting.   Neurological:  Negative for light-headedness.   All other systems reviewed and are negative.      Historical Information   Past Medical History:   Diagnosis Date    Amputation of toe (HCC)     3rd toe;  accident    Arthritis     Palliative care patient 2024     Past Surgical History:   Procedure Laterality Date    CATARACT EXTRACTION, BILATERAL      COLONOSCOPY N/A 11/15/2018    Procedure: COLONOSCOPY;  Surgeon: Daly Celeste MD;  Location:  MAIN OR;  Service: Gastroenterology    CORONARY ARTERY BYPASS GRAFT      ESOPHAGOGASTRODUODENOSCOPY N/A 2018    Procedure: ESOPHAGOGASTRODUODENOSCOPY (EGD);  Surgeon: Zhang Cassidy MD;  Location:  MAIN OR;  Service: Gastroenterology    MITRAL VALVE REPLACEMENT       Social History     Tobacco Use    Smoking status: Former     Current packs/day: 0.00     Types: Cigarettes     Quit date: 1985     Years since quittin.2    Smokeless tobacco: Never   Vaping Use    Vaping status: Never Used   Substance and Sexual Activity    Alcohol use: Yes     Comment: occasional    Drug use: No    Sexual activity: Not on file     E-Cigarette/Vaping    E-Cigarette Use Never User      E-Cigarette/Vaping Substances    Nicotine No     Flavoring No      Family History   Problem Relation Age of Onset    Alzheimer's disease Mother      Social History:  Marital Status: /Civil Union   Occupation: retired  Patient Pre-hospital Living Situation: Home  Patient Pre-hospital Level of Mobility: walks  Patient Pre-hospital Diet Restrictions: none    Meds/Allergies   I have reviewed home medications with patient personally.  Prior to Admission medications    Medication Sig Start Date End Date Taking? Authorizing Provider   albuterol (ProAir  HFA) 90 mcg/act inhaler Inhale 2 puffs every 6 (six) hours as needed for wheezing or shortness of breath 10/14/24  Yes SHAWNA Fisher   atorvastatin (LIPITOR) 10 mg tablet Take by mouth 7/27/10  Yes Historical Provider, MD   ferrous sulfate 325 (65 Fe) mg tablet Take 325 mg by mouth   Yes Historical Provider, MD   furosemide (LASIX) 20 mg tablet Take by mouth 7/27/10  Yes Historical Provider, MD   lisinopril (ZESTRIL) 5 mg tablet Take 5 mg by mouth daily 5/19/11  Yes Historical Provider, MD   metoprolol tartrate (LOPRESSOR) 25 mg tablet Take 25 mg by mouth every 12 (twelve) hours 1/25/11  Yes Historical Provider, MD   potassium chloride (K-DUR,KLOR-CON) 20 mEq tablet Take 20 mEq by mouth daily 7/12/11  Yes Historical Provider, MD   tetrahydrozoline (VISINE) 0.05 % ophthalmic solution Administer 1 drop to both eyes every morning   Yes Historical Provider, MD   warfarin (COUMADIN) 5 mg tablet Take 5 mg by mouth daily 10/10/11  Yes Historical Provider, MD   mirtazapine (REMERON) 7.5 MG tablet Take 1 tablet (7.5 mg total) by mouth daily at bedtime  Patient not taking: Reported on 2/27/2025 2/7/24 2/27/25  Shana Bynum PA-C     Allergies   Allergen Reactions    Adhesive [Medical Tape] Rash       Objective :  Temp:  [97.5 °F (36.4 °C)] 97.5 °F (36.4 °C)  HR:  [75-78] 75  BP: (145)/(68-78) 145/68  Resp:  [17-18] 17  SpO2:  [94 %-97 %] 94 %  O2 Device: None (Room air)    Physical Exam  Vitals and nursing note reviewed.   Constitutional:       General: He is not in acute distress.     Appearance: He is not diaphoretic.   HENT:      Head: Normocephalic.   Eyes:      General:         Right eye: No discharge.         Left eye: No discharge.   Cardiovascular:      Rate and Rhythm: Normal rate. Rhythm irregular.   Pulmonary:      Effort: Pulmonary effort is normal. No respiratory distress.      Breath sounds: Normal breath sounds. No wheezing, rhonchi or rales.   Abdominal:      General: There is no  distension.      Palpations: Abdomen is soft.      Tenderness: There is no abdominal tenderness. There is no guarding or rebound.   Musculoskeletal:      Cervical back: Normal range of motion.      Right lower leg: No edema.      Left lower leg: No edema.   Skin:     General: Skin is warm.   Neurological:      Mental Status: He is alert and oriented to person, place, and time.   Psychiatric:         Mood and Affect: Mood normal.         Behavior: Behavior normal.         Thought Content: Thought content normal.         Judgment: Judgment normal.          Lines/Drains:            Lab Results: I have reviewed the following results:  Results from last 7 days   Lab Units 02/27/25  1328   WBC Thousand/uL 5.99   HEMOGLOBIN g/dL 12.7   HEMATOCRIT % 41.4   PLATELETS Thousands/uL 161   SEGS PCT % 61   LYMPHO PCT % 23   MONO PCT % 12   EOS PCT % 2     Results from last 7 days   Lab Units 02/27/25  1328   SODIUM mmol/L 143   POTASSIUM mmol/L 4.8   CHLORIDE mmol/L 107   CO2 mmol/L 28   BUN mg/dL 27*   CREATININE mg/dL 1.26   ANION GAP mmol/L 8   CALCIUM mg/dL 9.1   ALBUMIN g/dL 4.4   TOTAL BILIRUBIN mg/dL 0.99   ALK PHOS U/L 57   ALT U/L 19   AST U/L 33   GLUCOSE RANDOM mg/dL 150*     Results from last 7 days   Lab Units 02/27/25  1328   INR  3.13*         Lab Results   Component Value Date    HGBA1C 5.7 10/15/2018    HGBA1C 5.6 04/03/2018    HGBA1C 5.8 09/28/2017           Imaging Results Review: I reviewed radiology reports from this admission including: chest xray.  Other Study Results Review: EKG was reviewed.  EKG was personally reviewed and my interpretation is: Personally Reviewed..    Administrative Statements       ** Please Note: This note has been constructed using a voice recognition system. **

## 2025-02-27 NOTE — ASSESSMENT & PLAN NOTE
Shortness of breath on exertion that is going on for a few weeks now.  Chest x-ray without acute findings, flu/COVID/RSV negative  Unclear etiology, might be related to heart disease.  At room air saturating well, in no distress.

## 2025-02-27 NOTE — ASSESSMENT & PLAN NOTE
Lab Results   Component Value Date    EGFR 50 02/27/2025    EGFR 63 10/16/2024    EGFR 65 12/29/2022    CREATININE 1.26 02/27/2025    CREATININE 1.04 10/16/2024    CREATININE 1.02 12/29/2022     Continued stable at baseline

## 2025-02-27 NOTE — ASSESSMENT & PLAN NOTE
Lab Results   Component Value Date    HSTNI0 548 (H) 02/27/2025    HSTNI2 570 (H) 02/27/2025    HSTNID2 22 (H) 02/27/2025    HSTNI4 124 (H) 12/25/2022    HSTNID4 28 (H) 12/25/2022      Patient reported chest pain on and off for a few weeks.  Last night he started to have retrosternal pressure 5 out of 10 without radiation that lasted all night until this morning.  EKG with paced rhythm  Might be demand ischemia  Discussed with patient and son at bedside.  Patient reported he does not want any intervention done also he declined echo.  Stated echo was offered by his cardiology in the past also but he refused.  Stated he lived enough and we will did not do anything aggressive.  He is agreeable with oral medication/IV as needed (conservative management).   Continue aspirin, atorvastatin, lisinopril, metoprolol.  Will start Imdur 30 mg daily and Ranexa 500 mg every 12 hours to help with his anginal symptoms  Monitor

## 2025-02-27 NOTE — ASSESSMENT & PLAN NOTE
More than 20 years ago  On warfarin 5 mg daily except Sunday when he takes 2.5 mg  INR 3.1 today at goal (goal 2.5-3.5)  Continue warfarin and monitor INR a.m.

## 2025-02-27 NOTE — ED PROVIDER NOTES
Time reflects when diagnosis was documented in both MDM as applicable and the Disposition within this note       Time User Action Codes Description Comment    2/27/2025  2:31 PM Rene Burgess [I50.9] CHF (congestive heart failure) (HCC)     2/27/2025  2:31 PM Rene Burgess [R07.89] Chest tightness           ED Disposition       ED Disposition   Admit    Condition   Stable    Date/Time   Thu Feb 27, 2025  2:37 PM    Comment   Case was discussed with CHRISTOFER Vences and the patient's admission status was agreed to be Admission Status: inpatient status to the service of Dr. Vences .               Assessment & Plan       Medical Decision Making  Diff includes anxiety, symptomatic anemia, CHF, COPD, PE, arrhythmia, coronary syndrome, viral illness    Amount and/or Complexity of Data Reviewed  Labs: ordered.  Radiology: ordered and independent interpretation performed.    Risk  Prescription drug management.  Decision regarding hospitalization.             Medications   sodium chloride (PF) 0.9 % injection 3 mL (has no administration in time range)   furosemide (LASIX) injection 40 mg (40 mg Intravenous Given 2/27/25 1439)       ED Risk Strat Scores                            SBIRT 22yo+      Flowsheet Row Most Recent Value   Initial Alcohol Screen: US AUDIT-C     1. How often do you have a drink containing alcohol? 0 Filed at: 02/27/2025 1306   2. How many drinks containing alcohol do you have on a typical day you are drinking?  0 Filed at: 02/27/2025 1306   3a. Male UNDER 65: How often do you have five or more drinks on one occasion? 0 Filed at: 02/27/2025 1306   3b. FEMALE Any Age, or MALE 65+: How often do you have 4 or more drinks on one occassion? 0 Filed at: 02/27/2025 1306   Audit-C Score 0 Filed at: 02/27/2025 1306   RUSTY: How many times in the past year have you...    Used an illegal drug or used a prescription medication for non-medical reasons? Never Filed at: 02/27/2025 1306                            History of  Present Illness       Chief Complaint   Patient presents with    Shortness of Breath     Patient presents to the ED via EMS with SOB and chronic cough but reports last night he had chest heaviness. 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;  accident    Arthritis     Palliative care patient 2024      Past Surgical History:   Procedure Laterality Date    CATARACT EXTRACTION, BILATERAL      COLONOSCOPY N/A 11/15/2018    Procedure: COLONOSCOPY;  Surgeon: Daly Celeste MD;  Location: [facility];  Service: Gastroenterology    CORONARY ARTERY BYPASS GRAFT      ESOPHAGOGASTRODUODENOSCOPY N/A 2018    Procedure: ESOPHAGOGASTRODUODENOSCOPY (EGD);  Surgeon: Zhang Cassidy MD;  Location: [facility];  Service: Gastroenterology    MITRAL VALVE REPLACEMENT        Family History   Problem Relation Age of Onset    Alzheimer's disease Mother       Social History     Tobacco Use    Smoking status: Former     Current packs/day: 0.00     Types: Cigarettes     Quit date: 1985     Years since quittin.2    Smokeless tobacco: Never   Vaping Use    Vaping status: Never Used   Substance Use Topics    Alcohol use: Yes     Comment: occasional    Drug use: No      E-Cigarette/Vaping    E-Cigarette Use Never User       E-Cigarette/Vaping Substances    Nicotine No     Flavoring No       I have reviewed and agree with the history as documented.     Hx from patient and med records - pmh a fibb, pacemaker, cabg, mitral valve replacement on coumadin, takes furosemide and albuterol.  Biba from home with concern for SOB with exertion over last couple months, no relief with inhaler.  Then last night had some chest heaviness, felt better when upright.  Associated fatigue.        Review of Systems   Constitutional:  Positive for fatigue. Negative for chills and fever.   HENT:  Negative for rhinorrhea and sore throat.    Respiratory:  Positive for cough, chest  tightness and shortness of breath.    Cardiovascular:  Negative for chest pain.   Gastrointestinal:  Negative for constipation, diarrhea, nausea and vomiting.   Genitourinary:  Negative for dysuria and frequency.   Psychiatric/Behavioral:  The patient is nervous/anxious.    All other systems reviewed and are negative.          Objective       ED Triage Vitals   Temperature Pulse Blood Pressure Respirations SpO2 Patient Position - Orthostatic VS   02/27/25 1308 02/27/25 1308 02/27/25 1308 02/27/25 1308 02/27/25 1308 02/27/25 1500   97.5 °F (36.4 °C) 78 145/78 18 97 % Sitting      Temp Source Heart Rate Source BP Location FiO2 (%) Pain Score    02/27/25 1308 02/27/25 1308 02/27/25 1500 -- --    Oral Monitor Right arm        Vitals      Date and Time Temp Pulse SpO2 Resp BP Pain Score FACES Pain Rating User   02/27/25 1500 -- 75 94 % 17 145/68 -- -- KP   02/27/25 1308 97.5 °F (36.4 °C) 78 97 % 18 145/78 -- -- JW            Physical Exam  Vitals and nursing note reviewed.   Constitutional:       Appearance: He is well-developed.   HENT:      Head: Normocephalic and atraumatic.      Right Ear: External ear normal.      Left Ear: External ear normal.      Nose: Nose normal.   Eyes:      Conjunctiva/sclera: Conjunctivae normal.      Pupils: Pupils are equal, round, and reactive to light.   Cardiovascular:      Rate and Rhythm: Normal rate and regular rhythm.      Heart sounds: Normal heart sounds.   Pulmonary:      Effort: Pulmonary effort is normal. No respiratory distress.      Breath sounds: Normal breath sounds. No wheezing.   Abdominal:      General: Bowel sounds are normal. There is no distension.      Palpations: Abdomen is soft.      Tenderness: There is no abdominal tenderness.   Musculoskeletal:         General: No deformity. Normal range of motion.      Cervical back: Normal range of motion and neck supple. No spinous process tenderness.      Right lower leg: No edema.      Left lower leg: No edema.   Skin:      General: Skin is warm and dry.      Findings: No rash.   Neurological:      General: No focal deficit present.      Mental Status: He is alert.      GCS: GCS eye subscore is 4. GCS verbal subscore is 5. GCS motor subscore is 6.      Sensory: No sensory deficit.   Psychiatric:         Mood and Affect: Mood normal.         Results Reviewed       Procedure Component Value Units Date/Time    HS Troponin I 2hr [[id number]] Collected: 02/27/25 1543    Lab Status: In process Specimen: Blood from Line Updated: 02/27/25 1547    HS Troponin I 4hr [[id number]]     Lab Status: No result Specimen: Blood     FLU/RSV/COVID - if FLU/RSV clinically relevant (2hr TAT) [[id number]]  (Normal) Collected: 02/27/25 1328    Lab Status: Final result Specimen: Nares from Nose Updated: 02/27/25 1418     SARS-CoV-2 Negative     INFLUENZA A PCR Negative     INFLUENZA B PCR Negative     RSV PCR Negative    Narrative:      This test has been performed using the CoV-2/Flu/RSV plus assay on the [employer] platform. This test has been validated by the  and verified by the performing laboratory.     This test is designed to amplify and detect the following: nucleocapsid (N), envelope (E), and RNA-dependent RNA polymerase (RdRP) genes of the SARS-CoV-2 genome; matrix (M), basic polymerase (PB2), and acidic protein (PA) segments of the influenza A genome; matrix (M) and non-structural protein (NS) segments of the influenza B genome, and the nucleocapsid genes of RSV A and RSV B.     Positive results are indicative of the presence of Flu A, Flu B, RSV, and/or SARS-CoV-2 RNA. Positive results for SARS-CoV-2 or suspected novel influenza should be reported to state, local, or federal health departments according to local reporting requirements.      All results should be assessed in conjunction with clinical presentation and other laboratory markers for clinical management.     FOR PEDIATRIC PATIENTS - copy/paste COVID Guidelines URL to  browser: [email or web address]       D-dimer, quantitative [[id number]]  (Normal) Collected: 02/27/25 1328    Lab Status: Final result Specimen: Blood from Arm, Left Updated: 02/27/25 1415     D-Dimer, Quant 0.29 ug/ml FEU     Narrative:      In the evaluation for possible pulmonary embolism, in the appropriate (Well's Score of 4 or less) patient, the age adjusted d-dimer cutoff for this patient can be calculated as:    Age x 0.01 (in ug/mL) for Age-adjusted D-dimer exclusion threshold for a patient over 50 years.    APTT [[id number]]  (Abnormal) Collected: 02/27/25 1328    Lab Status: Final result Specimen: Blood from Arm, Left Updated: 02/27/25 1413     PTT 40 seconds     Protime-INR [[id number]]  (Abnormal) Collected: 02/27/25 1328    Lab Status: Final result Specimen: Blood from Arm, Left Updated: 02/27/25 1413     Protime 32.3 seconds      INR 3.13    Narrative:      INR Therapeutic Range    Indication                                             INR Range      Atrial Fibrillation                                               2.0-3.0  Hypercoagulable State                                    2.0.2.3  Left Ventricular Asist Device                            2.0-3.0  Mechanical Heart Valve                                  -    Aortic(with afib, MI, embolism, HF, LA enlargement,    and/or coagulopathy)                                     2.0-3.0 (2.5-3.5)     Mitral                                                             2.5-3.5  Prosthetic/Bioprosthetic Heart Valve               2.0-3.0  Venous thromboembolism (VTE: VT, PE        2.0-3.0    HS Troponin 0hr (reflex protocol) [[id number]]  (Abnormal) Collected: 02/27/25 1328    Lab Status: Final result Specimen: Blood from Arm, Left Updated: 02/27/25 1407     hs TnI 0hr 548 ng/L     B-Type Natriuretic Peptide(BNP) [[id number]]  (Abnormal) Collected: 02/27/25 1328    Lab Status: Final result Specimen: Blood from Arm,  Left Updated: 02/27/25 1406     BNP 2,776 pg/mL     Comprehensive metabolic panel [[id number]]  (Abnormal) Collected: 02/27/25 1328    Lab Status: Final result Specimen: Blood from Arm, Left Updated: 02/27/25 1402     Sodium 143 mmol/L      Potassium 4.8 mmol/L      Chloride 107 mmol/L      CO2 28 mmol/L      ANION GAP 8 mmol/L      BUN 27 mg/dL      Creatinine 1.26 mg/dL      Glucose 150 mg/dL      Calcium 9.1 mg/dL      AST 33 U/L      ALT 19 U/L      Alkaline Phosphatase 57 U/L      Total Protein 7.3 g/dL      Albumin 4.4 g/dL      Total Bilirubin 0.99 mg/dL      eGFR 50 ml/min/1.73sq m     Narrative:      National Kidney Disease Foundation guidelines for Chronic Kidney Disease (CKD):     Stage 1 with normal or high GFR (GFR > 90 mL/min/1.73 square meters)    Stage 2 Mild CKD (GFR = 60-89 mL/min/1.73 square meters)    Stage 3A Moderate CKD (GFR = 45-59 mL/min/1.73 square meters)    Stage 3B Moderate CKD (GFR = 30-44 mL/min/1.73 square meters)    Stage 4 Severe CKD (GFR = 15-29 mL/min/1.73 square meters)    Stage 5 End Stage CKD (GFR <15 mL/min/1.73 square meters)  Note: GFR calculation is accurate only with a steady state creatinine    Magnesium [[id number]]  (Normal) Collected: 02/27/25 1328    Lab Status: Final result Specimen: Blood from Arm, Left Updated: 02/27/25 1402     Magnesium 2.4 mg/dL     CBC and differential [[id number]]  (Abnormal) Collected: 02/27/25 1328    Lab Status: Final result Specimen: Blood from Arm, Left Updated: 02/27/25 1344     WBC 5.99 Thousand/uL      RBC 4.40 Million/uL      Hemoglobin 12.7 g/dL      Hematocrit 41.4 %      MCV 94 fL      MCH 28.9 pg      MCHC 30.7 g/dL      RDW 14.6 %      MPV 12.2 fL      Platelets 161 Thousands/uL      nRBC 0 /100 WBCs      Segmented % 61 %      Immature Grans % 1 %      Lymphocytes % 23 %      Monocytes % 12 %      Eosinophils Relative 2 %      Basophils Relative 1 %      Absolute Neutrophils 3.75 Thousands/µL      Absolute Immature Grans 0.03  Thousand/uL      Absolute Lymphocytes 1.35 Thousands/µL      Absolute Monocytes 0.72 Thousand/µL      Eosinophils Absolute 0.10 Thousand/µL      Basophils Absolute 0.04 Thousands/µL             X-ray chest 1 view portable   ED Interpretation by Rene Burgess DO (02/27 1411)   Hazy at bases bilateral, increased vascular markings      Final Interpretation by Miguel Hannon MD (02/27 7433)      No acute cardiopulmonary disease.            Workstation performed: [id number]             ECG 12 Lead Documentation Only    Date/Time: 2/27/2025 1:36 PM    Performed by: Rene Burgess DO  Authorized by: Rene Burgess DO    Indications / Diagnosis:  Sob  ECG reviewed by me, the ED Provider: yes    Patient location:  ED  Previous ECG:     Previous ECG:  Compared to current    Comparison ECG info:  12-22    Similarity:  Changes noted  Interpretation:     Interpretation: normal    Rate:     ECG rate:  79    ECG rate assessment: normal    Rhythm:     Rhythm: paced    Pacing:     Type of pacing:  Ventricular  Ectopy:     Ectopy: none    QRS:     QRS axis:  Normal    QRS intervals:  Wide  Conduction:     Conduction: normal    T waves:     T waves: normal    Comments:      This EKG was interpreted by me.      ED Medication and Procedure Management   Prior to Admission Medications   Prescriptions Last Dose Informant Patient Reported? Taking?   albuterol (ProAir HFA) 90 mcg/act inhaler   No No   Sig: Inhale 2 puffs every 6 (six) hours as needed for wheezing or shortness of breath   atorvastatin (LIPITOR) 10 mg tablet   Yes No   Sig: Take by mouth   ferrous sulfate 325 (65 Fe) mg tablet   Yes No   Sig: Take 325 mg by mouth   furosemide (LASIX) 20 mg tablet   Yes No   Sig: Take by mouth   lisinopril (ZESTRIL) 5 mg tablet   Yes No   Sig: Take 5 mg by mouth daily   metoprolol tartrate (LOPRESSOR) 25 mg tablet   Yes No   Sig: Take 25 mg by mouth every 12 (twelve) hours   mirtazapine (REMERON) 7.5 MG tablet   No No   Sig: Take 1 tablet (7.5  mg total) by mouth daily at bedtime   potassium chloride (K-DUR,KLOR-CON) 20 mEq tablet   Yes No   Sig: Take 20 mEq by mouth daily   warfarin (COUMADIN) 5 mg tablet   Yes No   Sig: Take 5 mg by mouth daily      Facility-Administered Medications: None     Patient's Medications   Discharge Prescriptions    No medications on file     No discharge procedures on file.  ED SEPSIS DOCUMENTATION   Time reflects when diagnosis was documented in both MDM as applicable and the Disposition within this note       Time User Action Codes Description Comment    2/27/2025  2:31 PM Rene Burgess [I50.9] CHF (congestive heart failure) ([facility])     2/27/2025  2:31 PM Rene Burgess [R07.89] Chest tightness                  Rene Burgess DO  02/27/25 1549

## 2025-02-28 VITALS
DIASTOLIC BLOOD PRESSURE: 62 MMHG | TEMPERATURE: 97.4 F | RESPIRATION RATE: 16 BRPM | BODY MASS INDEX: 23.62 KG/M2 | OXYGEN SATURATION: 96 % | SYSTOLIC BLOOD PRESSURE: 101 MMHG | WEIGHT: 168.7 LBS | HEIGHT: 71 IN | HEART RATE: 82 BPM

## 2025-02-28 PROBLEM — R31.9 HEMATURIA: Status: ACTIVE | Noted: 2025-02-28

## 2025-02-28 LAB
ANION GAP SERPL CALCULATED.3IONS-SCNC: 5 MMOL/L (ref 4–13)
BUN SERPL-MCNC: 28 MG/DL (ref 5–25)
CALCIUM SERPL-MCNC: 8.7 MG/DL (ref 8.4–10.2)
CHLORIDE SERPL-SCNC: 104 MMOL/L (ref 96–108)
CO2 SERPL-SCNC: 32 MMOL/L (ref 21–32)
CREAT SERPL-MCNC: 1.26 MG/DL (ref 0.6–1.3)
ERYTHROCYTE [DISTWIDTH] IN BLOOD BY AUTOMATED COUNT: 14.5 % (ref 11.6–15.1)
GFR SERPL CREATININE-BSD FRML MDRD: 50 ML/MIN/1.73SQ M
GLUCOSE SERPL-MCNC: 91 MG/DL (ref 65–140)
HCT VFR BLD AUTO: 36.6 % (ref 36.5–49.3)
HGB BLD-MCNC: 11.4 G/DL (ref 12–17)
INR PPP: 3.65 (ref 0.85–1.19)
MCH RBC QN AUTO: 28.6 PG (ref 26.8–34.3)
MCHC RBC AUTO-ENTMCNC: 31.1 G/DL (ref 31.4–37.4)
MCV RBC AUTO: 92 FL (ref 82–98)
PLATELET # BLD AUTO: 131 THOUSANDS/UL (ref 149–390)
PMV BLD AUTO: 10.9 FL (ref 8.9–12.7)
POTASSIUM SERPL-SCNC: 3.7 MMOL/L (ref 3.5–5.3)
PROTHROMBIN TIME: 36.3 SECONDS (ref 12.3–15)
RBC # BLD AUTO: 3.98 MILLION/UL (ref 3.88–5.62)
SODIUM SERPL-SCNC: 141 MMOL/L (ref 135–147)
WBC # BLD AUTO: 5.94 THOUSAND/UL (ref 4.31–10.16)

## 2025-02-28 PROCEDURE — 99239 HOSP IP/OBS DSCHRG MGMT >30: CPT | Performed by: INTERNAL MEDICINE

## 2025-02-28 PROCEDURE — 85610 PROTHROMBIN TIME: CPT | Performed by: INTERNAL MEDICINE

## 2025-02-28 PROCEDURE — 80048 BASIC METABOLIC PNL TOTAL CA: CPT | Performed by: INTERNAL MEDICINE

## 2025-02-28 PROCEDURE — 85027 COMPLETE CBC AUTOMATED: CPT | Performed by: INTERNAL MEDICINE

## 2025-02-28 RX ORDER — ISOSORBIDE MONONITRATE 30 MG/1
30 TABLET, EXTENDED RELEASE ORAL DAILY
Qty: 30 TABLET | Refills: 0 | Status: SHIPPED | OUTPATIENT
Start: 2025-03-01

## 2025-02-28 RX ORDER — WARFARIN SODIUM 2.5 MG/1
2.5 TABLET ORAL
Status: DISCONTINUED | OUTPATIENT
Start: 2025-02-28 | End: 2025-02-28 | Stop reason: HOSPADM

## 2025-02-28 RX ORDER — RANOLAZINE 500 MG/1
500 TABLET, EXTENDED RELEASE ORAL EVERY 12 HOURS SCHEDULED
Qty: 60 TABLET | Refills: 0 | Status: SHIPPED | OUTPATIENT
Start: 2025-02-28

## 2025-02-28 RX ADMIN — METOPROLOL TARTRATE 25 MG: 25 TABLET, FILM COATED ORAL at 10:38

## 2025-02-28 RX ADMIN — RANOLAZINE 500 MG: 500 TABLET, EXTENDED RELEASE ORAL at 10:35

## 2025-02-28 RX ADMIN — FERROUS SULFATE TAB 325 MG (65 MG ELEMENTAL FE) 325 MG: 325 (65 FE) TAB at 10:35

## 2025-02-28 RX ADMIN — ISOSORBIDE MONONITRATE 30 MG: 30 TABLET, EXTENDED RELEASE ORAL at 10:35

## 2025-02-28 RX ADMIN — BACITRACIN ZINC, NEOMYCIN, POLYMYXIN B SULFAT: 5000; 3.5; 4 OINTMENT TOPICAL at 10:38

## 2025-02-28 RX ADMIN — TETRAHYDROZOLINE HCL 1 DROP: 0.05 SOLUTION/ DROPS OPHTHALMIC at 10:36

## 2025-02-28 RX ADMIN — LISINOPRIL 5 MG: 5 TABLET ORAL at 10:35

## 2025-02-28 RX ADMIN — FUROSEMIDE 20 MG: 20 TABLET ORAL at 10:35

## 2025-02-28 RX ADMIN — POTASSIUM CHLORIDE 20 MEQ: 1500 TABLET, EXTENDED RELEASE ORAL at 10:34

## 2025-02-28 NOTE — ASSESSMENT & PLAN NOTE
Blood pressure reviewed  Continue lisinopril 5 mg daily, metoprolol 25 twice daily Lasix oral daily  Added Imdur  Blood pressure stable

## 2025-02-28 NOTE — ASSESSMENT & PLAN NOTE
Microtrauma of the penis after using urinal.  Bleeding improved per nurse.  Monitor at home and follow-up with PCP if is getting worse.

## 2025-02-28 NOTE — DISCHARGE INSTR - AVS FIRST PAGE
Take warfarin 2.5 mg tonight instead of 5 mg.  Check INR tomorrow.   follow-up with the anticoagulation clinic for further management of warfarin.      Follow-up with your family doctor and with your cardiologist

## 2025-02-28 NOTE — ASSESSMENT & PLAN NOTE
Lab Results   Component Value Date    EGFR 50 02/28/2025    EGFR 50 02/27/2025    EGFR 63 10/16/2024    CREATININE 1.26 02/28/2025    CREATININE 1.26 02/27/2025    CREATININE 1.04 10/16/2024      stable at baseline

## 2025-02-28 NOTE — ASSESSMENT & PLAN NOTE
More than 20 years ago  On warfarin 5 mg daily except Sunday when he takes 2.5 mg  INR 3.6 today at goal (goal 2.5-3.5)  Discussed with patient and family.  Recommended warfarin 2.5 mg today and check INR tomorrow.  If INR is between 2.5-3.5 he can continue his home regiment however he should follow-up with anticoagulation clinic outpatient as soon as possible.

## 2025-02-28 NOTE — DISCHARGE SUMMARY
Discharge Summary - Hospitalist   Name: Larry Jenkins 89 y.o. male I MRN: [id number]  Unit/Bed#: -01 I Date of Admission: 2/27/2025   Date of Service: 2/28/2025 I Hospital Day: 1     Assessment & Plan  SOB (shortness of breath)  Shortness of breath on exertion that is going on for a few weeks now.  Chest x-ray without acute findings, flu/COVID/RSV negative  Unclear etiology, might be related to heart disease.  At room air saturating well, in no distress.  Essential hypertension  Blood pressure reviewed  Continue lisinopril 5 mg daily, metoprolol 25 twice daily Lasix oral daily  Added Imdur  Blood pressure stable  Stage 3a chronic kidney disease (HCC)  Lab Results   Component Value Date    EGFR 50 02/28/2025    EGFR 50 02/27/2025    EGFR 63 10/16/2024    CREATININE 1.26 02/28/2025    CREATININE 1.26 02/27/2025    CREATININE 1.04 10/16/2024      stable at baseline  Chronic systolic heart failure (HCC)  Wt Readings from Last 3 Encounters:   02/28/25 76.5 kg (168 lb 11.2 oz)   10/14/24 83 kg (183 lb)   02/07/24 86.5 kg (190 lb 12.8 oz)     History of CABG about 20 some years ago, follows with cardiology [place]  Echo 2018 showed LVEF 40 to 45%.  Patient on Lasix 20 mg daily, lisinopril 5 mg and metoprolol.  BNP 2.776 (was 2.844 in October 2024 which might be his baseline)  X-ray without acute pathology, physical exam normal without fluid overload  Received 40 IV Lasix in the emergency department  Hold any further IV Lasix.  Patient is not in CHF exacerbation.  Continue home medication         Elevated troponin  Lab Results   Component Value Date    HSTNI0 548 (H) 02/27/2025    HSTNI2 570 (H) 02/27/2025    HSTNID2 22 (H) 02/27/2025    HSTNI4 583 (H) 02/27/2025    HSTNID4 35 (H) 02/27/2025      Patient reported chest pain on and off for a few weeks.  Last night he started to have retrosternal pressure 5 out of 10 without radiation that lasted all night until this morning.  EKG with paced rhythm  Might be demand  ischemia  Discussed with patient and son at bedside.  Patient reported he does not want any intervention done also he declined echo.  Stated echo was offered by his cardiology in the past also but he refused.  Stated he lived enough and we will did not do anything aggressive.  He is agreeable with oral medication/IV as needed (conservative management).   Continue aspirin, atorvastatin, lisinopril, metoprolol.   Imdur 30 mg daily and Ranexa 500 mg every 12 hours to help with his anginal symptoms  Discussed with the patient and patient's son that he will need to follow-up with cardiology or his PCP.  If he still will have chest pain medication can be increased as needed however patient might not be chest pain-free completely  Currently patient without any pain.  Will place referral for palliative outpatient, patient's son in agreement  H/O mitral valve replacement  More than 20 years ago  On warfarin 5 mg daily except Sunday when he takes 2.5 mg  INR 3.6 today at goal (goal 2.5-3.5)  Discussed with patient and family.  Recommended warfarin 2.5 mg today and check INR tomorrow.  If INR is between 2.5-3.5 he can continue his home regiment however he should follow-up with anticoagulation clinic outpatient as soon as possible.  Paroxysmal atrial fibrillation (HCC)    Hematuria  Microtrauma of the penis after using urinal.  Bleeding improved per nurse.  Monitor at home and follow-up with PCP if is getting worse.     Medical Problems       Resolved Problems  Date Reviewed: 10/14/2024   None       Discharging Physician / Practitioner: Marcia Hebert MD  PCP: Andrey Bateman MD  Admission Date:   Admission Orders (From admission, onward)       Ordered        02/27/25 1437  INPATIENT ADMISSION  Once                          Discharge Date: 02/28/25    Consultations During Hospital Stay:  None    Procedures Performed:   None    Significant Findings / Test Results:   X-ray chest 1 view portable  Result Date:  "2/27/2025  Impression: No acute cardiopulmonary disease.       Incidental Findings:   None    Test Results Pending at Discharge (will require follow up):   none     Outpatient Tests Requested:  none    Complications:  none    Reason for Admission: Chest pressure    Hospital Course:   Larry Jenkins is a 89 y.o. male patient with PMH of CABG x 1, CHF with reduced ejection fraction, status post mitral valve replacement on warfarin, PFO closure, status post pacemaker implant  who originally presented to the hospital on 2/27/2025 due to chest pressure.  Patient reported shortness of breath with exertion that is going on for a few weeks with intermittent chest heaviness.  The night prior to presentation patient reported retrosternal chest pressure that lasted all night.    Patient was noted to have elevated troponin on presentation.  Patient refused to have any testing done as echo and further treatment based on results, reported he would prefer conservative management with medications only.    Was started on Imdur and Ranexa, did not have any chest pressure during admission.    Patient stable for discharge.  Follow-up with cardiology and PCP outpatient.      Please see above list of diagnoses and related plan for additional information.     Condition at Discharge: stable    Discharge Day Visit / Exam:   Subjective:  no symptoms   Vitals: Blood Pressure: 101/62 (02/28/25 1447)  Pulse: 82 (02/28/25 1447)  Temperature: (!) 97.4 °F (36.3 °C) (02/28/25 1447)  Temp Source: Oral (02/28/25 1447)  Respirations: 16 (02/28/25 1447)  Height: 5' 11\" (180.3 cm) (02/27/25 1955)  Weight - Scale: 76.5 kg (168 lb 11.2 oz) (02/28/25 0600)  SpO2: 96 % (02/28/25 1447)  Physical Exam  Vitals and nursing note reviewed.   Constitutional:       General: He is not in acute distress.     Appearance: He is not diaphoretic.   HENT:      Head: Normocephalic.   Eyes:      General:         Right eye: No discharge.         Left eye: No discharge. "   Cardiovascular:      Rate and Rhythm: Normal rate. Rhythm irregular.   Pulmonary:      Effort: Pulmonary effort is normal. No respiratory distress.      Breath sounds: Normal breath sounds. No wheezing, rhonchi or rales.   Abdominal:      General: There is no distension.      Palpations: Abdomen is soft.      Tenderness: There is no abdominal tenderness. There is no guarding or rebound.   Musculoskeletal:      Cervical back: Normal range of motion.      Right lower leg: No edema.      Left lower leg: No edema.   Skin:     General: Skin is warm.   Neurological:      Mental Status: He is alert and oriented to person, place, and time.   Psychiatric:         Mood and Affect: Mood normal.         Behavior: Behavior normal.         Thought Content: Thought content normal.         Judgment: Judgment normal.          Discussion with Family: Updated  (son) via phone.    Discharge instructions/Information to patient and family:   See after visit summary for information provided to patient and family.      Provisions for Follow-Up Care:  See after visit summary for information related to follow-up care and any pertinent home health orders.      Mobility at time of Discharge:   Basic Mobility Inpatient Raw Score: 24  JH-HLM Goal: 8: Walk 250 feet or more  JH-HLM Achieved: 8: Walk 250 feet ot more  HLM Goal achieved. Continue to encourage appropriate mobility.     Disposition:   Home    Planned Readmission: no    Discharge Medications:  See after visit summary for reconciled discharge medications provided to patient and/or family.      Administrative Statements   Discharge Statement:  I have spent a total time of 38 minutes in caring for this patient on the day of the visit/encounter. .    **Please Note: This note may have been constructed using a voice recognition system**

## 2025-02-28 NOTE — ASSESSMENT & PLAN NOTE
Wt Readings from Last 3 Encounters:   02/28/25 76.5 kg (168 lb 11.2 oz)   10/14/24 83 kg (183 lb)   02/07/24 86.5 kg (190 lb 12.8 oz)     History of CABG about 20 some years ago, follows with cardiology [place]  Echo 2018 showed LVEF 40 to 45%.  Patient on Lasix 20 mg daily, lisinopril 5 mg and metoprolol.  BNP 2.776 (was 2.844 in October 2024 which might be his baseline)  X-ray without acute pathology, physical exam normal without fluid overload  Received 40 IV Lasix in the emergency department  Hold any further IV Lasix.  Patient is not in CHF exacerbation.  Continue home medication

## 2025-02-28 NOTE — ASSESSMENT & PLAN NOTE
Lab Results   Component Value Date    HSTNI0 548 (H) 02/27/2025    HSTNI2 570 (H) 02/27/2025    HSTNID2 22 (H) 02/27/2025    HSTNI4 583 (H) 02/27/2025    HSTNID4 35 (H) 02/27/2025      Patient reported chest pain on and off for a few weeks.  Last night he started to have retrosternal pressure 5 out of 10 without radiation that lasted all night until this morning.  EKG with paced rhythm  Might be demand ischemia  Discussed with patient and son at bedside.  Patient reported he does not want any intervention done also he declined echo.  Stated echo was offered by his cardiology in the past also but he refused.  Stated he lived enough and we will did not do anything aggressive.  He is agreeable with oral medication/IV as needed (conservative management).   Continue aspirin, atorvastatin, lisinopril, metoprolol.   Imdur 30 mg daily and Ranexa 500 mg every 12 hours to help with his anginal symptoms  Discussed with the patient and patient's son that he will need to follow-up with cardiology or his PCP.  If he still will have chest pain medication can be increased as needed however patient might not be chest pain-free completely  Currently patient without any pain.  Will place referral for palliative outpatient, patient's son in agreement

## 2025-02-28 NOTE — CASE MANAGEMENT
Case Management Assessment & Discharge Planning Note    Patient name Larry Jenkins  Location /-01 MRN [id number]  : 1935 Date 2025       Current Admission Date: 2025  Current Admission Diagnosis:Elevated troponin   Patient Active Problem List    Diagnosis Date Noted Date Diagnosed    Chronic systolic heart failure (HCC) 2025     SOB (shortness of breath) 2025     Elevated troponin 2025     H/O mitral valve replacement 2025     Hypertensive chronic kidney disease 2024     Advanced care planning/counseling discussion 2023     COVID-19 2022     Elevated CK 2022     Chronic a-fib (HCC) 2022     Depression, recurrent (HCC) 2022     Stage 3a chronic kidney disease (HCC) 2022     Primary osteoarthritis of both hips 2021     Trochanteric bursitis of both hips 2021     Paroxysmal atrial fibrillation (HCC) 2018     GI bleed 2018     Coagulopathy (HCC) 2018     Hyperglycemia 2016     Hypocalcemia 2016     Herpes zoster 2015     Insomnia 2013     Anxiety 2012     Arthritis 2012     Anemia of chronic disease 2012     Arteriosclerotic cardiovascular disease 2012     Mixed hyperlipidemia 2012     Essential hypertension 2012       LOS (days): 1  Geometric Mean LOS (GMLOS) (days): 2.1  Days to GMLOS:1.1     OBJECTIVE:    Risk of Unplanned Readmission Score: 14.01         Current admission status: Inpatient       Preferred Pharmacy:   [facility] - [address] [address]  Phone: [phone] Fax: [phone]    [id number] - [place] - [id number] [facility]  [address] [facility]  [place] [address]  Phone: [phone] Fax: [phone]    Primary Care Provider: Andrey Bateman MD    Primary Insurance: MEDICARE  Secondary Insurance: [employer]  BL[employer]IELD    ASSESSMENT:  Active Health Care Proxies       Bigg Jenkins Health Care Agent - Son   Primary Phone: [phone] (Mobile)  Home Phone: [phone]                 Advance Directives  Does patient have a Health Care POA?: Yes  Does patient have Advance Directives?: Yes  Advance Directives: Power of  for health care  Primary Contact: Kenn         Readmission Root Cause  30 Day Readmission: No    Patient Information  Admitted from:: Home  Mental Status: Alert  During Assessment patient was accompanied by: Not accompanied during assessment  Assessment information provided by:: Patient  Primary Caregiver: Self  Support Systems: Self, Family members  County of Residence: [place]  What city do you live in?: [place]  Home entry access options. Select all that apply.: Stairs  Number of steps to enter home.: 2  Type of Current Residence: [facility]  Living Arrangements: Lives Alone  Is patient a ?: No    Activities of Daily Living Prior to Admission  Functional Status: Independent  Completes ADLs independently?: Yes  Ambulates independently?: Yes  Does patient use assisted devices?: Yes  Assisted Devices (DME) used: Shower Chair, Straight Cane, Other (Comment) (Inhalers)  Does patient currently own DME?: Yes  What DME does the patient currently own?: Straight Cane, Walker, Shower Chair (Inhalers)  Does patient have a history of Outpatient Therapy (PT/OT)?: Yes  Does the patient have a history of Short-Term Rehab?: Yes ([facility])  Does patient have a history of HHC?: Yes  Does patient currently have HHC?: No         Patient Information Continued  Income Source: SSI/SSD  Does patient have prescription coverage?: Yes  Does patient receive dialysis treatments?: No  Does patient have a history of Mental Health Diagnosis?: No         Means of Transportation  Means of Transport to Appts:: Drives Self          DISCHARGE DETAILS:    Discharge planning discussed with:: Pt  Freedom of Choice: Yes  Comments  - Freedom of Choice: Pt agreeable to returning home.  CM contacted family/caregiver?: No- see comments (In contact with family.)  Were Treatment Team discharge recommendations reviewed with patient/caregiver?: Yes  Did patient/caregiver verbalize understanding of patient care needs?: Yes  Were patient/caregiver advised of the risks associated with not following Treatment Team discharge recommendations?: Yes         Requested Home Health Care         Is the patient interested in HHC at discharge?: No    DME Referral Provided  Referral made for DME?: No    Other Referral/Resources/Interventions Provided:  Interventions: None Indicated         Treatment Team Recommendation: Home  Discharge Destination Plan:: Home  Transport at Discharge : Family                                      Additional Comments: Cm met with pt at bedside to discern discharge needs. Pt lives alone in a 1sh, 2ste, 1st fl setup. Pt reports being independent with walking and ADLs PTA. Reports using shower chair, cane, and inhalers. Cane is for community distances. Owns a walker. Hx of STR, HHC, and OPPT. No inpatient psych or D&A treatment. Preferred pharmacy is [facility], but [facility] Drives. Sons are medical POA, and sons will be transport at discharge.

## 2025-03-01 ENCOUNTER — APPOINTMENT (OUTPATIENT)
Dept: LAB | Facility: HOSPITAL | Age: OVER 89
End: 2025-03-01
Payer: MEDICARE

## 2025-03-01 DIAGNOSIS — I48.20 CHRONIC A-FIB (HCC): ICD-10-CM

## 2025-03-01 LAB
INR PPP: 3.82 (ref 0.85–1.19)
PROTHROMBIN TIME: 37.5 SECONDS (ref 12.3–15)

## 2025-03-01 PROCEDURE — 36415 COLL VENOUS BLD VENIPUNCTURE: CPT

## 2025-03-01 PROCEDURE — 85610 PROTHROMBIN TIME: CPT

## 2025-03-03 ENCOUNTER — TRANSITIONAL CARE MANAGEMENT (OUTPATIENT)
Dept: FAMILY MEDICINE CLINIC | Facility: CLINIC | Age: OVER 89
End: 2025-03-03

## 2025-03-07 ENCOUNTER — OFFICE VISIT (OUTPATIENT)
Dept: FAMILY MEDICINE CLINIC | Facility: CLINIC | Age: OVER 89
End: 2025-03-07
Payer: MEDICARE

## 2025-03-07 VITALS
DIASTOLIC BLOOD PRESSURE: 70 MMHG | OXYGEN SATURATION: 99 % | BODY MASS INDEX: 24.41 KG/M2 | SYSTOLIC BLOOD PRESSURE: 122 MMHG | HEART RATE: 78 BPM | WEIGHT: 175 LBS | TEMPERATURE: 98.3 F

## 2025-03-07 DIAGNOSIS — I50.22 CHRONIC SYSTOLIC HEART FAILURE (HCC): ICD-10-CM

## 2025-03-07 DIAGNOSIS — I48.0 PAROXYSMAL ATRIAL FIBRILLATION (HCC): Primary | ICD-10-CM

## 2025-03-07 DIAGNOSIS — Z91.81 AT HIGH RISK FOR FALLS: ICD-10-CM

## 2025-03-07 DIAGNOSIS — I10 ESSENTIAL HYPERTENSION: ICD-10-CM

## 2025-03-07 DIAGNOSIS — N18.31 STAGE 3A CHRONIC KIDNEY DISEASE (HCC): ICD-10-CM

## 2025-03-07 DIAGNOSIS — F33.9 DEPRESSION, RECURRENT (HCC): ICD-10-CM

## 2025-03-07 PROCEDURE — 99495 TRANSJ CARE MGMT MOD F2F 14D: CPT

## 2025-03-07 RX ORDER — AMOXICILLIN 500 MG/1
CAPSULE ORAL
COMMUNITY
Start: 2024-12-02 | End: 2025-03-07

## 2025-03-08 NOTE — ASSESSMENT & PLAN NOTE
"PHQ-2/9 Depression Screening    Little interest or pleasure in doing things: 0 - not at all  Feeling down, depressed, or hopeless: 2 - more than half the days  Trouble falling or staying asleep, or sleeping too much: 0 - not at all  Feeling tired or having little energy: 0 - not at all  Poor appetite or overeatin - not at all  Feeling bad about yourself - or that you are a failure or have let yourself or your family down: 0 - not at all  Trouble concentrating on things, such as reading the newspaper or watching television: 0 - not at all  Moving or speaking so slowly that other people could have noticed. Or the opposite - being so fidgety or restless that you have been moving around a lot more than usual: 0 - not at all  Thoughts that you would be better off dead, or of hurting yourself in some way: 0 - not at all  PHQ-9 Score: 2  PHQ-9 Interpretation: No or Minimal depression        Patient admits to depression ever since the loss of his wife a couple years ago.  Patient expresses wishes to die so that he can be with his wife.  Patient states, \"I just think I am too healthy still.\" Patient denies suicidal ideation but declines any further testing or invasive procedures.  Patient still wants to continue medication at this time.  However patient states, \"I asked my cardiologist last year what would happen if I stopped all my meds.  He was unable to give me a definitive answer.\"Palliative care was referred upon discharge.  Patient declined their services.  Reviewed the role of palliative care.  Patient is not interested in this at this time.  Son visibly tearful as he would like his father to reconsider palliative treatment.  Discussed that should patient change his mind, he can call the office and we will place referral.  Also reviewed role of hospice care should patient decide to stop his medication.  Patient and son verbalized understanding.         "

## 2025-03-08 NOTE — PROGRESS NOTES
"Transition of Care Visit  Name: Larry Jenkins      : 1935      MRN: [id number]  Encounter Provider: SHAWNA Fisher  Encounter Date: 3/7/2025   Encounter department: [facility]    Assessment & Plan  Paroxysmal atrial fibrillation (HCC)  Follows with cards. M-Sa 5 mg QD and Sun 2.5 mg. INR 3.2 yesterday at coumadin clinic.          Essential hypertension  BP well controlled. Continue current medication regimen.          Stage 3a chronic kidney disease (HCC)  Lab Results   Component Value Date    EGFR 50 2025    EGFR 50 2025    EGFR 63 10/16/2024    CREATININE 1.26 2025    CREATININE 1.26 2025    CREATININE 1.04 10/16/2024   Renal function stable at this time.          Depression, recurrent (HCC)  PHQ-2/9 Depression Screening    Little interest or pleasure in doing things: 0 - not at all  Feeling down, depressed, or hopeless: 2 - more than half the days  Trouble falling or staying asleep, or sleeping too much: 0 - not at all  Feeling tired or having little energy: 0 - not at all  Poor appetite or overeatin - not at all  Feeling bad about yourself - or that you are a failure or have let yourself or your family down: 0 - not at all  Trouble concentrating on things, such as reading the newspaper or watching television: 0 - not at all  Moving or speaking so slowly that other people could have noticed. Or the opposite - being so fidgety or restless that you have been moving around a lot more than usual: 0 - not at all  Thoughts that you would be better off dead, or of hurting yourself in some way: 0 - not at all  PHQ-9 Score: 2  PHQ-9 Interpretation: No or Minimal depression        Patient admits to depression ever since the loss of his wife a couple years ago.  Patient expresses wishes to die so that he can be with his wife.  Patient states, \"I just think I am too healthy still.\" Patient denies suicidal ideation but declines any further " "testing or invasive procedures.  Patient still wants to continue medication at this time.  However patient states, \"I asked my cardiologist last year what would happen if I stopped all my meds.  He was unable to give me a definitive answer.\"Palliative care was referred upon discharge.  Patient declined their services.  Reviewed the role of palliative care.  Patient is not interested in this at this time.  Son visibly tearful as he would like his father to reconsider palliative treatment.  Discussed that should patient change his mind, he can call the office and we will place referral.  Also reviewed role of hospice care should patient decide to stop his medication.  Patient and son verbalized understanding.         Chronic systolic heart failure (HCC)  Wt Readings from Last 3 Encounters:   03/07/25 79.4 kg (175 lb)   02/28/25 76.5 kg (168 lb 11.2 oz)   10/14/24 83 kg (183 lb)     2/27/25 patient presented to the hospital for chest pain for several hours preventing him from sleep for an entire night.  Also with worsening DELGADILLO.  Patient did receive IV Lasix while in the ER prior to admission as there was concern for acute on chronic CHF.  Patient did have elevated troponins but declined any further cardiac workup including echo.  Was thought to be demand ischemia. Patient to continue daily weights.  Is compliant with Lasix, metoptolol, asa, coumadin, atorvastatin, lisniporil at home.  Patient has continued imdur and renexa upon discharge. Son states that cardiologist will be managing meds. Follows with [facility] cards.               At high risk for falls  Son states that patient refuses to wear life alert.  Son states that they got both the necklace and bracelet.  Discussed concern should he fall that he would have no way of contacting anyone as patient lives alone.  Despite conversation patient still not interested in wearing life alert.       Depression Screening and Follow-up Plan: Patient was screened for depression " during today's encounter. They screened negative with a PHQ-9 score of 2.          History of Present Illness     Transitional Care Management Review:   Larry Jenkins is a 89 y.o. male here for TCM follow up.     During the TCM phone call patient stated:  TCM Call       Date and time call was made  3/3/2025  6:11 PM    Hospital care reviewed  Records reviewed    Patient was hospitialized at  [facility]    Date of Admission  02/27/25    Date of discharge  02/28/25    Diagnosis  Elevated troponin    Disposition  Home    Were the patients medications reviewed and updated  Yes    Current Symptoms  None    Cough Severity  Mild    Weakness severity  Mild    Fatigue severity  Mild          TCM Call       Post hospital issues  None    Should patient be enrolled in anticoag monitoring?  No    Scheduled for follow up?  Yes    Patients specialists  Cardiologist    Did you obtain your prescribed medications  Yes    Do you need help managing your prescriptions or medications  No    Is transportation to your appointment needed  No    I have advised the patient to call PCP with any new or worsening symptoms  LEESA Pike    Living Arrangements  Alone    Support System  Family    The type of support provided  Emotional    Do you have social support  Yes, some    Are you recieving any outpatient services  No    Are you recieving home care services  No    Are you using any community resources  No    Current waiver services  No    Interperter language line needed  No    Counseling  Patient          TCM sp hospitalization for chest pain.      Review of Systems   Constitutional:  Positive for fatigue. Negative for activity change and fever.   HENT:  Negative for congestion, ear pain, rhinorrhea and sore throat.    Eyes:  Negative for pain.   Respiratory:  Positive for shortness of breath. Negative for cough and wheezing.    Cardiovascular:  Negative for chest pain and leg swelling.   Gastrointestinal:  Negative for  abdominal pain, diarrhea, nausea and vomiting.   Musculoskeletal:  Negative for arthralgias and myalgias.   Skin:  Negative for rash.   Neurological:  Negative for dizziness, weakness and numbness.   Psychiatric/Behavioral:  Positive for dysphoric mood and sleep disturbance. Negative for suicidal ideas. The patient is not nervous/anxious.    All other systems reviewed and are negative.    Objective   /70 (BP Location: Left arm, Patient Position: Sitting, Cuff Size: Standard)   Pulse 78   Temp 98.3 °F (36.8 °C) (Oral)   Wt 79.4 kg (175 lb)   SpO2 99%   BMI 24.41 kg/m²     Physical Exam  Vitals and nursing note reviewed.   Constitutional:       General: He is not in acute distress.     Appearance: He is well-developed.   HENT:      Head: Normocephalic and atraumatic.      Right Ear: External ear normal.      Left Ear: External ear normal.   Cardiovascular:      Rate and Rhythm: Normal rate and regular rhythm.      Heart sounds: Normal heart sounds. No murmur heard.  Pulmonary:      Effort: Pulmonary effort is normal. No respiratory distress.      Breath sounds: Normal breath sounds. No wheezing, rhonchi or rales.   Chest:      Chest wall: No tenderness.   Abdominal:      General: Bowel sounds are normal. There is no distension.      Palpations: Abdomen is soft.      Tenderness: There is no abdominal tenderness.   Musculoskeletal:      Cervical back: Neck supple.   Skin:     General: Skin is warm and dry.      Capillary Refill: Capillary refill takes less than 2 seconds.      Coloration: Skin is pale.   Neurological:      Mental Status: He is alert. Mental status is at baseline.   Psychiatric:         Attention and Perception: Attention and perception normal.         Mood and Affect: Affect normal. Mood is depressed. Mood is not anxious.         Speech: Speech normal.         Behavior: Behavior normal. Behavior is cooperative.         Thought Content: Thought content normal. Thought content does not include  suicidal ideation. Thought content does not include suicidal plan.       Medications have been reviewed by provider in current encounter    Administrative Statements   I have spent a total time of 30 minutes in caring for this patient on the day of the visit/encounter including Risks and benefits of tx options, Instructions for management, Patient and family education, Importance of tx compliance, Risk factor reductions, Impressions, Documenting in the medical record, Reviewing/placing orders in the medical record (including tests, medications, and/or procedures), and Obtaining or reviewing history  .

## 2025-03-08 NOTE — ASSESSMENT & PLAN NOTE
Wt Readings from Last 3 Encounters:   03/07/25 79.4 kg (175 lb)   02/28/25 76.5 kg (168 lb 11.2 oz)   10/14/24 83 kg (183 lb)     2/27/25 patient presented to the hospital for chest pain for several hours preventing him from sleep for an entire night.  Also with worsening DELGADILLO.  Patient did receive IV Lasix while in the ER prior to admission as there was concern for acute on chronic CHF.  Patient did have elevated troponins but declined any further cardiac workup including echo.  Was thought to be demand ischemia. Patient to continue daily weights.  Is compliant with Lasix, metoptolol, asa, coumadin, atorvastatin, lisniporil at home.  Patient has continued imdur and renexa upon discharge. Son states that cardiologist will be managing meds. Follows with [facility] cards.

## 2025-03-08 NOTE — ASSESSMENT & PLAN NOTE
Lab Results   Component Value Date    EGFR 50 02/28/2025    EGFR 50 02/27/2025    EGFR 63 10/16/2024    CREATININE 1.26 02/28/2025    CREATININE 1.26 02/27/2025    CREATININE 1.04 10/16/2024   Renal function stable at this time.

## 2025-03-24 ENCOUNTER — TELEMEDICINE (OUTPATIENT)
Dept: FAMILY MEDICINE CLINIC | Facility: CLINIC | Age: OVER 89
End: 2025-03-24
Payer: MEDICARE

## 2025-03-24 DIAGNOSIS — Z00.00 MEDICARE ANNUAL WELLNESS VISIT, SUBSEQUENT: Primary | ICD-10-CM

## 2025-03-24 DIAGNOSIS — F33.9 DEPRESSION, RECURRENT (HCC): ICD-10-CM

## 2025-03-24 DIAGNOSIS — I48.20 CHRONIC A-FIB (HCC): ICD-10-CM

## 2025-03-24 DIAGNOSIS — Z71.89 ADVANCED CARE PLANNING/COUNSELING DISCUSSION: ICD-10-CM

## 2025-03-24 PROCEDURE — G0439 PPPS, SUBSEQ VISIT: HCPCS

## 2025-03-24 PROCEDURE — 99213 OFFICE O/P EST LOW 20 MIN: CPT

## 2025-03-24 PROCEDURE — G2211 COMPLEX E/M VISIT ADD ON: HCPCS

## 2025-03-24 NOTE — ASSESSMENT & PLAN NOTE
States that he has been considering hospice services. Declined palliative ref. Not hospice appropriate at this time but is considering discontinuing medication. Plans to speak with cardiology in August to discuss medication.

## 2025-03-24 NOTE — PATIENT INSTRUCTIONS
Medicare Preventive Visit Patient Instructions  Thank you for completing your Welcome to Medicare Visit or Medicare Annual Wellness Visit today. Your next wellness visit will be due in one year (3/25/2026).  The screening/preventive services that you may require over the next 5-10 years are detailed below. Some tests may not apply to you based off risk factors and/or age. Screening tests ordered at today's visit but not completed yet may show as past due. Also, please note that scanned in results may not display below.  Preventive Screenings:  Service Recommendations Previous Testing/Comments   Colorectal Cancer Screening  Colonoscopy    Fecal Occult Blood Test (FOBT)/Fecal Immunochemical Test (FIT)  Fecal DNA/Cologuard Test  Flexible Sigmoidoscopy Age: 45-75 years old   Colonoscopy: every 10 years (May be performed more frequently if at higher risk)  OR  FOBT/FIT: every 1 year  OR  Cologuard: every 3 years  OR  Sigmoidoscopy: every 5 years  Screening may be recommended earlier than age 45 if at higher risk for colorectal cancer. Also, an individualized decision between you and your healthcare provider will decide whether screening between the ages of 76-85 would be appropriate. Colonoscopy: Not on file  FOBT/FIT: Not on file  Cologuard: Not on file  Sigmoidoscopy: Not on file          Prostate Cancer Screening Individualized decision between patient and health care provider in men between ages of 55-69   Medicare will cover every 12 months beginning on the day after your 50th birthday PSA: No results in last 5 years           Hepatitis C Screening Once for adults born between 1945 and 1965  More frequently in patients at high risk for Hepatitis C Hep C Antibody: Not on file        Diabetes Screening 1-2 times per year if you're at risk for diabetes or have pre-diabetes Fasting glucose: 105 mg/dL (10/16/2024)  A1C: No results in last 5 years (No results in last 5 years)      Cholesterol Screening Once every 5 years  if you don't have a lipid disorder. May order more often based on risk factors. Lipid panel: 10/16/2024         Other Preventive Screenings Covered by Medicare:  Abdominal Aortic Aneurysm (AAA) Screening: covered once if your at risk. You're considered to be at risk if you have a family history of AAA or a male between the age of 65-75 who smoking at least 100 cigarettes in your lifetime.  Lung Cancer Screening: covers low dose CT scan once per year if you meet all of the following conditions: (1) Age 55-77; (2) No signs or symptoms of lung cancer; (3) Current smoker or have quit smoking within the last 15 years; (4) You have a tobacco smoking history of at least 20 pack years (packs per day x number of years you smoked); (5) You get a written order from a healthcare provider.  Glaucoma Screening: covered annually if you're considered high risk: (1) You have diabetes OR (2) Family history of glaucoma OR (3)  aged 50 and older OR (4)  American aged 65 and older  Osteoporosis Screening: covered every 2 years if you meet one of the following conditions: (1) Have a vertebral abnormality; (2) On glucocorticoid therapy for more than 3 months; (3) Have primary hyperparathyroidism; (4) On osteoporosis medications and need to assess response to drug therapy.  HIV Screening: covered annually if you're between the age of 15-65. Also covered annually if you are younger than 15 and older than 65 with risk factors for HIV infection. For pregnant patients, it is covered up to 3 times per pregnancy.    Immunizations:  Immunization Recommendations   Influenza Vaccine Annual influenza vaccination during flu season is recommended for all persons aged >= 6 months who do not have contraindications   Pneumococcal Vaccine   * Pneumococcal conjugate vaccine = PCV13 (Prevnar 13), PCV15 (Vaxneuvance), PCV20 (Prevnar 20)  * Pneumococcal polysaccharide vaccine = PPSV23 (Pneumovax) Adults 19-63 yo with certain risk  factors or if 65+ yo  If never received any pneumonia vaccine: recommend Prevnar 20 (PCV20)  Give PCV20 if previously received 1 dose of PCV13 or PPSV23   Hepatitis B Vaccine 3 dose series if at intermediate or high risk (ex: diabetes, end stage renal disease, liver disease)   Respiratory syncytial virus (RSV) Vaccine - COVERED BY MEDICARE PART D  * RSVPreF3 (Arexvy) CDC recommends that adults 60 years of age and older may receive a single dose of RSV vaccine using shared clinical decision-making (SCDM)   Tetanus (Td) Vaccine - COST NOT COVERED BY MEDICARE PART B Following completion of primary series, a booster dose should be given every 10 years to maintain immunity against tetanus. Td may also be given as tetanus wound prophylaxis.   Tdap Vaccine - COST NOT COVERED BY MEDICARE PART B Recommended at least once for all adults. For pregnant patients, recommended with each pregnancy.   Shingles Vaccine (Shingrix) - COST NOT COVERED BY MEDICARE PART B  2 shot series recommended in those 19 years and older who have or will have weakened immune systems or those 50 years and older     Health Maintenance Due:  There are no preventive care reminders to display for this patient.  Immunizations Due:      Topic Date Due   • COVID-19 Vaccine (6 - 2024-25 season) 09/01/2024     Advance Directives   What are advance directives?  Advance directives are legal documents that state your wishes and plans for medical care. These plans are made ahead of time in case you lose your ability to make decisions for yourself. Advance directives can apply to any medical decision, such as the treatments you want, and if you want to donate organs.   What are the types of advance directives?  There are many types of advance directives, and each state has rules about how to use them. You may choose a combination of any of the following:  Living will:  This is a written record of the treatment you want. You can also choose which treatments you do  not want, which to limit, and which to stop at a certain time. This includes surgery, medicine, IV fluid, and tube feedings.   Durable power of  for healthcare (DPAHC):  This is a written record that states who you want to make healthcare choices for you when you are unable to make them for yourself. This person, called a proxy, is usually a family member or a friend. You may choose more than 1 proxy.  Do not resuscitate (DNR) order:  A DNR order is used in case your heart stops beating or you stop breathing. It is a request not to have certain forms of treatment, such as CPR. A DNR order may be included in other types of advance directives.  Medical directive:  This covers the care that you want if you are in a coma, near death, or unable to make decisions for yourself. You can list the treatments you want for each condition. Treatment may include pain medicine, surgery, blood transfusions, dialysis, IV or tube feedings, and a ventilator (breathing machine).  Values history:  This document has questions about your views, beliefs, and how you feel and think about life. This information can help others choose the care that you would choose.  Why are advance directives important?  An advance directive helps you control your care. Although spoken wishes may be used, it is better to have your wishes written down. Spoken wishes can be misunderstood, or not followed. Treatments may be given even if you do not want them. An advance directive may make it easier for your family to make difficult choices about your care.       © Copyright [employer] 2018 Information is for End User's use only and may not be sold, redistributed or otherwise used for commercial purposes. All illustrations and images included in CareNotes® are the copyrighted property of [employer] or [employer]

## 2025-03-24 NOTE — ASSESSMENT & PLAN NOTE
Follows with [facility] cards. Patient discontinued ranexa and imdur due to constipation. States that when he started the meds, he began to have bowel problems and then when he stopped is bowels returned to normal. Discussed risks associated with non-adherence. Patient verbalized understanding. States that he will follow up with cards should he want to resume taking the imdur and/or ranexa.

## 2025-03-24 NOTE — ASSESSMENT & PLAN NOTE
"Depression Screening Follow-up Plan: Patient's depression screening was positive with a PHQ-9 score of 15. Patient assessed for underlying major depression. They have no active suicidal ideations. Brief counseling provided and recommend additional follow-up/re-evaluation next office visit. Patient declines further evaluation by mental health professional and/or medications. They have no active suicidal ideations. Brief counseling provided and recommend additional follow-up/re-evaluation at next office visit.    PHQ-2/9 Depression Screening    Little interest or pleasure in doing things: 3 - nearly every day  Feeling down, depressed, or hopeless: 3 - nearly every day  Trouble falling or staying asleep, or sleeping too much: 2 - more than half the days  Feeling tired or having little energy: 3 - nearly every day  Poor appetite or overeatin - not at all  Feeling bad about yourself - or that you are a failure or have let yourself or your family down: 0 - not at all  Trouble concentrating on things, such as reading the newspaper or watching television: 1 - several days  Moving or speaking so slowly that other people could have noticed. Or the opposite - being so fidgety or restless that you have been moving around a lot more than usual: 0 - not at all  Thoughts that you would be better off dead, or of hurting yourself in some way: 3 - nearly every day  PHQ-9 Score: 15  PHQ-9 Interpretation: Moderately severe depression        Patient denies active SI. Patient states, \"I would never do anything to hurt myself but I just think my family would be better off without me. I'm just ready to be with my wife but I think I'm too healthy still.\" Patient declines ref psych or pharm intervention. Patient with strong family support.          "

## 2025-03-24 NOTE — PROGRESS NOTES
Virtual AWV Consent    Reason for visit is AMW    Provider located at [facility]  [address] [id number]    Recent Visits  No visits were found meeting these conditions.  Showing recent visits within past 7 days and meeting all other requirements  Today's Visits  Date Type Provider Dept   25 Telemedicine SHAWNA Fisher [facility]   Showing today's visits and meeting all other requirements  Future Appointments  No visits were found meeting these conditions.  Showing future appointments within next 150 days and meeting all other requirements       Administrative Statements   Encounter provider SHAWNA Fisher    The Patient is located at Home and in the following state in which I hold an active license [place].    The patient was identified by name and date of birth. Larry Jenkins was informed that this is a telemedicine visit and that the visit is being conducted through the Epic Embedded platform. He agrees to proceed..  My office door was closed. No one else was in the room.  He acknowledged consent and understanding of privacy and security of the video platform. The patient has agreed to participate and understands they can discontinue the visit at any time.    I have spent a total time of 30 minutes in caring for this patient on the day of the visit/encounter including Risks and benefits of tx options, Instructions for management, Patient and family education, Importance of tx compliance, Risk factor reductions, Impressions, Documenting in the medical record, and Obtaining or reviewing history  , not including the time spent for establishing the audio/video connection.    Name: Larry Jenkins      : 1935      MRN: [id number]  Encounter Provider: SHAWNA Fisher  Encounter Date: 3/24/2025   Encounter department: [facility]    Assessment &  "Plan  Chronic a-fib (HCC)  Follows with [facility] cards. Patient discontinued ranexa and imdur due to constipation. States that when he started the meds, he began to have bowel problems and then when he stopped is bowels returned to normal. Discussed risks associated with non-adherence. Patient verbalized understanding. States that he will follow up with cards should he want to resume taking the imdur and/or ranexa.          Depression, recurrent (HCC)  Depression Screening Follow-up Plan: Patient's depression screening was positive with a PHQ-9 score of 15. Patient assessed for underlying major depression. They have no active suicidal ideations. Brief counseling provided and recommend additional follow-up/re-evaluation next office visit. Patient declines further evaluation by mental health professional and/or medications. They have no active suicidal ideations. Brief counseling provided and recommend additional follow-up/re-evaluation at next office visit.    PHQ-2/9 Depression Screening    Little interest or pleasure in doing things: 3 - nearly every day  Feeling down, depressed, or hopeless: 3 - nearly every day  Trouble falling or staying asleep, or sleeping too much: 2 - more than half the days  Feeling tired or having little energy: 3 - nearly every day  Poor appetite or overeatin - not at all  Feeling bad about yourself - or that you are a failure or have let yourself or your family down: 0 - not at all  Trouble concentrating on things, such as reading the newspaper or watching television: 1 - several days  Moving or speaking so slowly that other people could have noticed. Or the opposite - being so fidgety or restless that you have been moving around a lot more than usual: 0 - not at all  Thoughts that you would be better off dead, or of hurting yourself in some way: 3 - nearly every day  PHQ-9 Score: 15  PHQ-9 Interpretation: Moderately severe depression        Patient denies active SI. Patient states, \"I " "would never do anything to hurt myself but I just think my family would be better off without me. I'm just ready to be with my wife but I think I'm too healthy still.\" Patient declines ref psych or pharm intervention. Patient with strong family support.          Advanced care planning/counseling discussion  States that he has been considering hospice services. Declined palliative ref. Not hospice appropriate at this time but is considering discontinuing medication. Plans to speak with cardiology in August to discuss medication.          Medicare annual wellness visit, subsequent           Depression Screening and Follow-up Plan: Patient's depression screening was positive with a PHQ-9 score of 15.   Patient assessed for underlying major depression. Brief counseling provided and recommend additional follow-up/re-evaluation next office visit. Patient declines further evaluation by mental health professional and/or medications. Brief counseling provided. Will re-evaluate at next office visit.       Preventive health issues were discussed with patient, and age appropriate screening tests were ordered as noted in patient's After Visit Summary. Personalized health advice and appropriate referrals for health education or preventive services given if needed, as noted in patient's After Visit Summary.    History of Present Illness     Presents for W       Patient Care Team:  Andrey Bateman MD as PCP - General (Family Medicine)  MD Daly Dick MD as Endoscopist    Review of Systems   Constitutional:  Positive for fatigue. Negative for activity change and fever.   HENT:  Negative for congestion, ear pain, rhinorrhea and sore throat.    Eyes:  Negative for pain.   Respiratory:  Positive for shortness of breath. Negative for cough, chest tightness and wheezing.    Cardiovascular:  Negative for chest pain and leg swelling.   Gastrointestinal:  Negative for abdominal pain, diarrhea, nausea and vomiting. "   Musculoskeletal:  Negative for arthralgias and myalgias.   Skin:  Negative for rash.   Neurological:  Negative for dizziness, weakness and numbness.   Psychiatric/Behavioral:  Positive for dysphoric mood and sleep disturbance. Negative for suicidal ideas. The patient is not nervous/anxious.    All other systems reviewed and are negative.    Medical History Reviewed by provider this encounter:  Tobacco  Allergies  Meds  Problems  Med Hx  Surg Hx  Fam Hx       Annual Wellness Visit Questionnaire   Larry is here for his Subsequent Wellness visit.     Health Risk Assessment:   Patient rates overall health as good. Patient feels that their physical health rating is same. Patient is dissatisfied with their life. Eyesight was rated as same. Hearing was rated as same. Patient feels that their emotional and mental health rating is same. Patient states they are never, rarely unusually tired/fatigued. Pain experienced in the last 7 days has been some. Patient's pain rating has been 5/10. Patient states that he has experienced no weight loss or gain in last 6 months.     Depression Screening:   PHQ-9 Score: 15      Fall Risk Screening:   In the past year, patient has experienced: no history of falling in past year      Home Safety:  Patient does not have trouble with stairs inside or outside of their home. Patient has working smoke alarms and has working carbon monoxide detector. Home safety hazards include: medications that cause fatigue.     Nutrition:   Current diet is Low Saturated Fat, Low Cholesterol and No Added Salt.     Medications:   Patient is not currently taking any over-the-counter supplements. Patient is able to manage medications.     Activities of Daily Living (ADLs)/Instrumental Activities of Daily Living (IADLs):   Walk and transfer into and out of bed and chair?: Yes  Dress and groom yourself?: Yes    Bathe or shower yourself?: Yes    Feed yourself? Yes  Do your laundry/housekeeping?: Yes  Manage  your money, pay your bills and track your expenses?: Yes  Make your own meals?: Yes    Do your own shopping?: Yes    Previous Hospitalizations:   Any hospitalizations or ED visits within the last 12 months?: Yes    How many hospitalizations have you had in the last year?: 1-2    Advance Care Planning:   Living will: Yes    Durable POA for healthcare: Yes    Advanced directive: Yes    Five wishes given: No    End of Life Decisions reviewed with patient: Yes    Provider agrees with end of life decisions: Yes      PREVENTIVE SCREENINGS      Cardiovascular Screening:    General: Screening Not Indicated and History Lipid Disorder      Diabetes Screening:     General: Screening Current      Colorectal Cancer Screening:     General: Screening Not Indicated      Prostate Cancer Screening:    General: Screening Not Indicated      Abdominal Aortic Aneurysm (AAA) Screening:    Risk factors include: tobacco use        Lung Cancer Screening:     General: Screening Not Indicated    Screening, Brief Intervention, and Referral to Treatment (SBIRT)     Screening  Typical number of drinks in a day: 0  Typical number of drinks in a week: 0  Interpretation: Low risk drinking behavior.    Single Item Drug Screening:  How often have you used an illegal drug (including marijuana) or a prescription medication for non-medical reasons in the past year? never    Single Item Drug Screen Score: 0  Interpretation: Negative screen for possible drug use disorder    Social Drivers of Health     Financial Resource Strain: Low Risk  (12/19/2023)    Overall Financial Resource Strain (CARDIA)     Difficulty of Paying Living Expenses: Not hard at all   Food Insecurity: No Food Insecurity (3/24/2025)    Hunger Vital Sign     Worried About Running Out of Food in the Last Year: Never true     Ran Out of Food in the Last Year: Never true   Transportation Needs: No Transportation Needs (3/24/2025)    PRAPARE - Transportation     Lack of Transportation  (Medical): No     Lack of Transportation (Non-Medical): No   Housing Stability: Unknown (3/24/2025)    Housing Stability Vital Sign     Unable to Pay for Housing in the Last Year: No     Homeless in the Last Year: No   Utilities: Not At Risk (3/24/2025)    [facility] Utilities     Threatened with loss of utilities: No     No results found.    Objective   There were no vitals taken for this visit.    Physical Exam  Constitutional:       General: He is not in acute distress.     Appearance: He is well-developed.   HENT:      Head: Normocephalic and atraumatic.      Right Ear: External ear normal.      Left Ear: External ear normal.   Pulmonary:      Effort: Pulmonary effort is normal. No respiratory distress.   Musculoskeletal:      Cervical back: Neck supple.   Skin:     Coloration: Skin is pale.   Neurological:      Mental Status: He is alert and oriented to person, place, and time. Mental status is at baseline.   Psychiatric:         Attention and Perception: Attention and perception normal.         Mood and Affect: Affect normal. Mood is depressed. Mood is not anxious.         Speech: Speech normal.         Behavior: Behavior normal. Behavior is cooperative.         Thought Content: Thought content normal. Thought content does not include suicidal ideation. Thought content does not include suicidal plan.       Administrative Statements   I have spent a total time of 30 minutes in caring for this patient on the day of the visit/encounter including Risks and benefits of tx options, Instructions for management, Patient and family education, Importance of tx compliance, and Risk factor reductions.

## 2025-04-15 ENCOUNTER — OFFICE VISIT (OUTPATIENT)
Dept: PODIATRY | Facility: CLINIC | Age: OVER 89
End: 2025-04-15
Payer: MEDICARE

## 2025-04-15 VITALS — BODY MASS INDEX: 24.22 KG/M2 | WEIGHT: 173 LBS | HEIGHT: 71 IN

## 2025-04-15 DIAGNOSIS — B35.1 ONYCHOMYCOSIS: Primary | ICD-10-CM

## 2025-04-15 DIAGNOSIS — I73.9 PERIPHERAL VASCULAR DISEASE, UNSPECIFIED (HCC): ICD-10-CM

## 2025-04-15 PROCEDURE — 11721 DEBRIDE NAIL 6 OR MORE: CPT | Performed by: PODIATRIST

## 2025-04-15 PROCEDURE — 99202 OFFICE O/P NEW SF 15 MIN: CPT | Performed by: PODIATRIST

## 2025-04-15 PROCEDURE — 11056 PARNG/CUTG B9 HYPRKR LES 2-4: CPT | Performed by: PODIATRIST

## 2025-04-19 ENCOUNTER — HOSPITAL ENCOUNTER (INPATIENT)
Facility: HOSPITAL | Age: OVER 89
LOS: 3 days | Discharge: HOME | End: 2025-04-23
Attending: EMERGENCY MEDICINE | Admitting: INTERNAL MEDICINE
Payer: MEDICARE

## 2025-04-19 ENCOUNTER — APPOINTMENT (EMERGENCY)
Dept: RADIOLOGY | Facility: HOSPITAL | Age: OVER 89
End: 2025-04-19
Payer: MEDICARE

## 2025-04-19 DIAGNOSIS — I50.9 CHF EXACERBATION (HCC): Primary | ICD-10-CM

## 2025-04-19 DIAGNOSIS — R06.09 DOE (DYSPNEA ON EXERTION): ICD-10-CM

## 2025-04-19 DIAGNOSIS — R06.02 SOB (SHORTNESS OF BREATH): ICD-10-CM

## 2025-04-19 PROBLEM — N18.9 CKD (CHRONIC KIDNEY DISEASE): Status: ACTIVE | Noted: 2024-03-04

## 2025-04-19 PROBLEM — Z95.0 PACEMAKER: Status: ACTIVE | Noted: 2025-04-19

## 2025-04-19 LAB
2HR DELTA HS TROPONIN: 13 NG/L
4HR DELTA HS TROPONIN: 5 NG/L
ALBUMIN SERPL BCG-MCNC: 4.2 G/DL (ref 3.5–5)
ALP SERPL-CCNC: 52 U/L (ref 34–104)
ALT SERPL W P-5'-P-CCNC: 16 U/L (ref 7–52)
ANION GAP SERPL CALCULATED.3IONS-SCNC: 4 MMOL/L (ref 4–13)
APTT PPP: 43 SECONDS (ref 23–34)
AST SERPL W P-5'-P-CCNC: 26 U/L (ref 13–39)
BASOPHILS # BLD AUTO: 0.04 THOUSANDS/ÂΜL (ref 0–0.1)
BASOPHILS NFR BLD AUTO: 1 % (ref 0–1)
BILIRUB SERPL-MCNC: 1.18 MG/DL (ref 0.2–1)
BNP SERPL-MCNC: 3092 PG/ML (ref 0–100)
BUN SERPL-MCNC: 29 MG/DL (ref 5–25)
CALCIUM SERPL-MCNC: 9.2 MG/DL (ref 8.4–10.2)
CARDIAC TROPONIN I PNL SERPL HS: 202 NG/L (ref ?–50)
CARDIAC TROPONIN I PNL SERPL HS: 207 NG/L (ref ?–50)
CARDIAC TROPONIN I PNL SERPL HS: 215 NG/L (ref ?–50)
CHLORIDE SERPL-SCNC: 108 MMOL/L (ref 96–108)
CO2 SERPL-SCNC: 30 MMOL/L (ref 21–32)
CREAT SERPL-MCNC: 1.32 MG/DL (ref 0.6–1.3)
EOSINOPHIL # BLD AUTO: 0.11 THOUSAND/ÂΜL (ref 0–0.61)
EOSINOPHIL NFR BLD AUTO: 2 % (ref 0–6)
ERYTHROCYTE [DISTWIDTH] IN BLOOD BY AUTOMATED COUNT: 15.5 % (ref 11.6–15.1)
GFR SERPL CREATININE-BSD FRML MDRD: 47 ML/MIN/1.73SQ M
GLUCOSE SERPL-MCNC: 106 MG/DL (ref 65–140)
HCT VFR BLD AUTO: 42.8 % (ref 36.5–49.3)
HGB BLD-MCNC: 13 G/DL (ref 12–17)
IMM GRANULOCYTES # BLD AUTO: 0.03 THOUSAND/UL (ref 0–0.2)
IMM GRANULOCYTES NFR BLD AUTO: 1 % (ref 0–2)
INR PPP: 3.79 (ref 0.85–1.19)
LIPASE SERPL-CCNC: 19 U/L (ref 11–82)
LYMPHOCYTES # BLD AUTO: 1.27 THOUSANDS/ÂΜL (ref 0.6–4.47)
LYMPHOCYTES NFR BLD AUTO: 22 % (ref 14–44)
MCH RBC QN AUTO: 28.8 PG (ref 26.8–34.3)
MCHC RBC AUTO-ENTMCNC: 30.4 G/DL (ref 31.4–37.4)
MCV RBC AUTO: 95 FL (ref 82–98)
MONOCYTES # BLD AUTO: 0.69 THOUSAND/ÂΜL (ref 0.17–1.22)
MONOCYTES NFR BLD AUTO: 12 % (ref 4–12)
NEUTROPHILS # BLD AUTO: 3.62 THOUSANDS/ÂΜL (ref 1.85–7.62)
NEUTS SEG NFR BLD AUTO: 62 % (ref 43–75)
NRBC BLD AUTO-RTO: 0 /100 WBCS
PLATELET # BLD AUTO: 148 THOUSANDS/UL (ref 149–390)
PMV BLD AUTO: 12.2 FL (ref 8.9–12.7)
POTASSIUM SERPL-SCNC: 4.5 MMOL/L (ref 3.5–5.3)
PROT SERPL-MCNC: 7 G/DL (ref 6.4–8.4)
PROTHROMBIN TIME: 37.3 SECONDS (ref 12.3–15)
RBC # BLD AUTO: 4.51 MILLION/UL (ref 3.88–5.62)
SODIUM SERPL-SCNC: 142 MMOL/L (ref 135–147)
TSH SERPL DL<=0.05 MIU/L-ACNC: 2.11 UIU/ML (ref 0.45–4.5)
TSH SERPL DL<=0.05 MIU/L-ACNC: 2.2 UIU/ML (ref 0.45–4.5)
WBC # BLD AUTO: 5.76 THOUSAND/UL (ref 4.31–10.16)

## 2025-04-19 PROCEDURE — 84484 ASSAY OF TROPONIN QUANT: CPT

## 2025-04-19 PROCEDURE — 84443 ASSAY THYROID STIM HORMONE: CPT

## 2025-04-19 PROCEDURE — 85610 PROTHROMBIN TIME: CPT

## 2025-04-19 PROCEDURE — 71046 X-RAY EXAM CHEST 2 VIEWS: CPT

## 2025-04-19 PROCEDURE — 93005 ELECTROCARDIOGRAM TRACING: CPT

## 2025-04-19 PROCEDURE — 99223 1ST HOSP IP/OBS HIGH 75: CPT

## 2025-04-19 PROCEDURE — 85730 THROMBOPLASTIN TIME PARTIAL: CPT

## 2025-04-19 PROCEDURE — 80053 COMPREHEN METABOLIC PANEL: CPT

## 2025-04-19 PROCEDURE — 99285 EMERGENCY DEPT VISIT HI MDM: CPT

## 2025-04-19 PROCEDURE — 83690 ASSAY OF LIPASE: CPT

## 2025-04-19 PROCEDURE — 96374 THER/PROPH/DIAG INJ IV PUSH: CPT

## 2025-04-19 PROCEDURE — 85025 COMPLETE CBC W/AUTO DIFF WBC: CPT

## 2025-04-19 PROCEDURE — 83880 ASSAY OF NATRIURETIC PEPTIDE: CPT

## 2025-04-19 PROCEDURE — 36415 COLL VENOUS BLD VENIPUNCTURE: CPT

## 2025-04-19 RX ORDER — POTASSIUM CHLORIDE 1500 MG/1
20 TABLET, EXTENDED RELEASE ORAL DAILY
Status: DISCONTINUED | OUTPATIENT
Start: 2025-04-19 | End: 2025-04-22

## 2025-04-19 RX ORDER — FUROSEMIDE 10 MG/ML
40 INJECTION INTRAMUSCULAR; INTRAVENOUS
Status: DISCONTINUED | OUTPATIENT
Start: 2025-04-19 | End: 2025-04-19

## 2025-04-19 RX ORDER — WARFARIN SODIUM 2.5 MG/1
2.5 TABLET ORAL
Status: DISCONTINUED | OUTPATIENT
Start: 2025-04-20 | End: 2025-04-23 | Stop reason: HOSPADM

## 2025-04-19 RX ORDER — FERROUS SULFATE 325(65) MG
325 TABLET ORAL
Status: DISCONTINUED | OUTPATIENT
Start: 2025-04-20 | End: 2025-04-23 | Stop reason: HOSPADM

## 2025-04-19 RX ORDER — ALBUTEROL SULFATE 90 UG/1
2 INHALANT RESPIRATORY (INHALATION) EVERY 6 HOURS PRN
Status: DISCONTINUED | OUTPATIENT
Start: 2025-04-19 | End: 2025-04-23 | Stop reason: HOSPADM

## 2025-04-19 RX ORDER — METOPROLOL TARTRATE 25 MG/1
25 TABLET, FILM COATED ORAL EVERY 12 HOURS SCHEDULED
Status: DISCONTINUED | OUTPATIENT
Start: 2025-04-19 | End: 2025-04-23 | Stop reason: HOSPADM

## 2025-04-19 RX ORDER — TETRAHYDROZOLINE HCL 0.05 %
1 DROPS OPHTHALMIC (EYE) EVERY MORNING
Status: DISCONTINUED | OUTPATIENT
Start: 2025-04-20 | End: 2025-04-23 | Stop reason: HOSPADM

## 2025-04-19 RX ORDER — FUROSEMIDE 10 MG/ML
40 INJECTION INTRAMUSCULAR; INTRAVENOUS
Status: DISCONTINUED | OUTPATIENT
Start: 2025-04-19 | End: 2025-04-20

## 2025-04-19 RX ORDER — ATORVASTATIN CALCIUM 10 MG/1
10 TABLET, FILM COATED ORAL
Status: DISCONTINUED | OUTPATIENT
Start: 2025-04-19 | End: 2025-04-23 | Stop reason: HOSPADM

## 2025-04-19 RX ORDER — LISINOPRIL 5 MG/1
5 TABLET ORAL DAILY
Status: DISCONTINUED | OUTPATIENT
Start: 2025-04-19 | End: 2025-04-23 | Stop reason: HOSPADM

## 2025-04-19 RX ORDER — FUROSEMIDE 10 MG/ML
20 INJECTION INTRAMUSCULAR; INTRAVENOUS ONCE
Status: COMPLETED | OUTPATIENT
Start: 2025-04-19 | End: 2025-04-19

## 2025-04-19 RX ORDER — WARFARIN SODIUM 5 MG/1
5 TABLET ORAL
Status: DISCONTINUED | OUTPATIENT
Start: 2025-04-19 | End: 2025-04-19 | Stop reason: DRUGHIGH

## 2025-04-19 RX ORDER — WARFARIN SODIUM 5 MG/1
5 TABLET ORAL
Status: DISCONTINUED | OUTPATIENT
Start: 2025-04-21 | End: 2025-04-23 | Stop reason: HOSPADM

## 2025-04-19 RX ADMIN — METOPROLOL TARTRATE 25 MG: 25 TABLET, FILM COATED ORAL at 20:50

## 2025-04-19 RX ADMIN — FUROSEMIDE 20 MG: 10 INJECTION, SOLUTION INTRAVENOUS at 13:49

## 2025-04-19 RX ADMIN — POTASSIUM CHLORIDE 20 MEQ: 1500 TABLET, EXTENDED RELEASE ORAL at 17:36

## 2025-04-19 RX ADMIN — FUROSEMIDE 40 MG: 10 INJECTION, SOLUTION INTRAVENOUS at 17:36

## 2025-04-19 RX ADMIN — ATORVASTATIN CALCIUM 10 MG: 10 TABLET, FILM COATED ORAL at 17:36

## 2025-04-19 NOTE — PLAN OF CARE
Problem: Potential for Falls  Goal: Patient will remain free of falls  Description: INTERVENTIONS:- Educate patient/family on patient safety including physical limitations- Instruct patient to call for assistance with activity - Consult OT/PT to assist with strengthening/mobility - Keep Call bell within reach- Keep bed low and locked with side rails adjusted as appropriate- Keep care items and personal belongings within reach- Initiate and maintain comfort rounds- Make Fall Risk Sign visible to staff  Problem: PAIN - ADULT  Goal: Verbalizes/displays adequate comfort level or baseline comfort level  Description: Interventions:- Encourage patient to monitor pain and request assistance- Assess pain using appropriate pain scale- Administer analgesics based on type and severity of pain and evaluate response- Implement non-pharmacological measures as appropriate and evaluate response- Consider cultural and social influences on pain and pain management- Notify physician/advanced practitioner if interventions unsuccessful or patient reports new pain  Outcome: Progressing     Problem: INFECTION - ADULT  Goal: Absence or prevention of progression during hospitalization  Description: INTERVENTIONS:- Assess and monitor for signs and symptoms of infection- Monitor lab/diagnostic results- Monitor all insertion sites, i.e. indwelling lines, tubes, and drains- Monitor endotracheal if appropriate and nasal secretions for changes in amount and color- [place] appropriate cooling/warming therapies per order- Administer medications as ordered- Instruct and encourage patient and family to use good hand hygiene technique- Identify and instruct in appropriate isolation precautions for identified infection/condition  Outcome: Progressing  Goal: Absence of fever/infection during neutropenic period  Description: INTERVENTIONS:- Monitor WBC  Outcome: Progressing     Outcome: Progressing

## 2025-04-19 NOTE — ASSESSMENT & PLAN NOTE
Patient with history of hypertension on lisinopril and metoprolol  Continue metoprolol  Hold lisinopril in setting of elevated creatinine level

## 2025-04-19 NOTE — ED PROVIDER NOTES
"Time reflects when diagnosis was documented in both MDM as applicable and the Disposition within this note       Time User Action Codes Description Comment    4/19/2025  2:54 PM Pearl Martin Add [I50.9] CHF exacerbation (HCC)     4/19/2025  2:55 PM Pearl Martin Add [R06.09] DELGADILLO (dyspnea on exertion)           ED Disposition       ED Disposition   Admit    Condition   Stable    Date/Time   Sat Apr 19, 2025  2:54 PM    Comment   Case was discussed with Dr. Bishop and the patient's admission status was agreed to be Admission Status: observation status to the service of Dr. Bishop .               Assessment & Plan       Medical Decision Making  DDX including but not limited to: Viral illness, pneumonia, pleural effusion, CHF exacerbation, ACS; considered but less likely PE given on warfarin    The patient presents with 2 weeks of persistent shortness of breath, decided today to come in to get checked out as he is \"sick of feeling this way\".  He appears well, is not dyspneic in conversation, and without increased work of breathing or respiratory distress.  He is minimally diminished in the bases but otherwise aerating appropriately with pulse ox of 99%.  Physical exam with 1+ pitting edema to bilateral lower legs.  Otherwise unremarkable.  Will obtain EKG and troponin to further evaluate for ACS.  Will obtain BNP given history of CHF.  Will obtain labs to further evaluate for electrolyte derangement, CHRISTINE, organ dysfunction, and anemia.  Will treat with single dose of Lasix for suspicion of possible fluid overload given history and 1+ pitting edema.    Problems Addressed:  CHF exacerbation (HCC): chronic illness or injury  DELGADILLO (dyspnea on exertion): acute illness or injury    Amount and/or Complexity of Data Reviewed  Labs: ordered.  Radiology: ordered and independent interpretation performed. Decision-making details documented in ED Course.  ECG/medicine tests: ordered and independent interpretation " performed.    Risk  OTC drugs.  Prescription drug management.  Decision regarding hospitalization.        ED Course as of 04/19/25 1501   Sat Apr 19, 2025   1405 I discussed with the patient and his 2 sons his current results.  Do suspect heart failure as contributing cause to his shortness of breath.  He has not had an echocardiogram in approximately 3 years.  He reports since his wife passing away 3.5 years ago he has loss of ortiz and purpose of life.  He has not been as good about his health and seen his cardiologist.  When here in February, he had refused echocardiogram because he thought it did not make a difference and he is okay if his heart is failing.  I discussed with him today if we were to admit him for IV diuretics, renal function monitoring, and echocardiogram, we would be able to further understand where his heart functionality is at.  This may change how we treat him or what medications he is on.  He will discuss further with his sons what his wishes are including hospitalization versus discharge to home with follow-up with [facility]   1416 XR chest 2 views  IMPRESSION:     No focal consolidation, pleural effusion, or pneumothorax.        1500 After further discussion, the patient does wish to stay for further testing and management of his fluid overload.  Case discussed with hospital medicine, accepted by Dr. Bishop       Medications   furosemide (LASIX) injection 20 mg (20 mg Intravenous Given 4/19/25 1349)       ED Risk Strat Scores                    No data recorded        SBIRT 20yo+      Flowsheet Row Most Recent Value   Initial Alcohol Screen: US AUDIT-C     1. How often do you have a drink containing alcohol? 0 Filed at: 04/19/2025 1238   2. How many drinks containing alcohol do you have on a typical day you are drinking?  0 Filed at: 04/19/2025 1238   3a. Male UNDER 65: How often do you have five or more drinks on one occasion? 0 Filed at: 04/19/2025 1238   3b. FEMALE Any Age, or  MALE 65+: How often do you have 4 or more drinks on one occassion? 0 Filed at: 2025 1238   Audit-C Score 0 Filed at: 2025 1238   RUSTY: How many times in the past year have you...    Used an illegal drug or used a prescription medication for non-medical reasons? Never Filed at: 2025 1238                            History of Present Illness       Chief Complaint   Patient presents with    Shortness of Breath     Pt arrives via EMS from home after pt reported increase sob after stopping ranolazine after causing constipation. Pt reports last dose was taken 3 days ago. Pt is denying any cp at the moment.        Past Medical History:   Diagnosis Date    Amputation of toe (HCC)     3rd toe;  accident    Anemia     Arthritis     Coronary artery disease     Depression     Myocardial infarction (HCC)     Palliative care patient 2024    Shingles       Past Surgical History:   Procedure Laterality Date    CARDIAC VALVE REPLACEMENT      CATARACT EXTRACTION, BILATERAL      COLONOSCOPY N/A 11/15/2018    Procedure: COLONOSCOPY;  Surgeon: Daly Celeste MD;  Location:  MAIN OR;  Service: Gastroenterology    CORONARY ARTERY BYPASS GRAFT      ESOPHAGOGASTRODUODENOSCOPY N/A 2018    Procedure: ESOPHAGOGASTRODUODENOSCOPY (EGD);  Surgeon: Zhang Cassidy MD;  Location:  MAIN OR;  Service: Gastroenterology    EYE SURGERY      Cataracts    MITRAL VALVE REPLACEMENT        Family History   Problem Relation Age of Onset    Alzheimer's disease Mother     Dementia Mother       Social History     Tobacco Use    Smoking status: Former     Current packs/day: 0.00     Types: Cigarettes     Quit date: 1985     Years since quittin.4     Passive exposure: Past    Smokeless tobacco: Never   Vaping Use    Vaping status: Never Used   Substance Use Topics    Alcohol use: Yes     Comment: occasional    Drug use: No      E-Cigarette/Vaping    E-Cigarette Use Never User       E-Cigarette/Vaping  "Substances    Nicotine No     THC No     CBD No     Flavoring No     Other No     Unknown No       I have reviewed and agree with the history as documented.     The patient is an 89-year-old male with PMH of HTN, CHF, stage III CKD, and mitral valve replacement on warfarin brought in by EMS for shortness of breath.  The patient reports feeling short of breath \"all the time\" but worse when up and moving around for the past 2 weeks.  He also notes some mild fatigue.  He has been compliant with his Lasix and has been urinating normally.  He denies new leg swelling.  He denies associated fevers, chills, and cough.  He denies associated chest pain, palpitations, abdominal pain, N/V, and skin changes.      History provided by:  Patient   used: No    Shortness of Breath  Associated symptoms: no abdominal pain, no chest pain, no fever, no headaches, no rash, no vomiting and no wheezing        Review of Systems   Constitutional:  Positive for fatigue. Negative for chills and fever.   Respiratory:  Positive for shortness of breath. Negative for choking, chest tightness and wheezing.    Cardiovascular:  Negative for chest pain, palpitations and leg swelling.   Gastrointestinal:  Negative for abdominal pain, diarrhea, nausea and vomiting.   Genitourinary: Negative.    Skin:  Negative for rash and wound.   Neurological:  Negative for dizziness, syncope, weakness, light-headedness and headaches.   All other systems reviewed and are negative.          Objective       ED Triage Vitals   Temperature Pulse Blood Pressure Respirations SpO2 Patient Position - Orthostatic VS   04/19/25 1227 04/19/25 1226 04/19/25 1226 04/19/25 1226 04/19/25 1226 04/19/25 1226   97.5 °F (36.4 °C) 79 128/82 18 99 % Sitting      Temp Source Heart Rate Source BP Location FiO2 (%) Pain Score    04/19/25 1227 04/19/25 1226 04/19/25 1226 -- --    Axillary Monitor Left arm        Vitals      Date and Time Temp Pulse SpO2 Resp BP Pain Score " FACES Pain Rating User   25 1227 97.5 °F (36.4 °C) -- -- -- -- -- --    25 1226 -- 79 99 % 18 128/82 -- --             Physical Exam  Vitals and nursing note reviewed.   Constitutional:       General: He is not in acute distress.     Appearance: Normal appearance. He is well-developed. He is not ill-appearing, toxic-appearing or diaphoretic.   HENT:      Head: Normocephalic and atraumatic.      Jaw: There is normal jaw occlusion.      Nose: Nose normal.      Mouth/Throat:      Lips: Pink.      Mouth: Mucous membranes are moist.   Eyes:      Extraocular Movements: Extraocular movements intact.      Conjunctiva/sclera: Conjunctivae normal.   Cardiovascular:      Rate and Rhythm: Normal rate and regular rhythm.      Pulses:           Radial pulses are 2+ on the right side and 2+ on the left side.        Dorsalis pedis pulses are 2+ on the right side and 2+ on the left side.      Heart sounds: Normal heart sounds, S1 normal and S2 normal.   Pulmonary:      Effort: Pulmonary effort is normal. No tachypnea, accessory muscle usage, prolonged expiration, respiratory distress or retractions.      Breath sounds: Normal air entry. No stridor, decreased air movement or transmitted upper airway sounds. Examination of the right-lower field reveals decreased breath sounds. Examination of the left-lower field reveals decreased breath sounds. Decreased breath sounds (Mildly diminished in the bases) present.   Abdominal:      General: There is no distension.      Palpations: Abdomen is soft.      Tenderness: There is no abdominal tenderness. There is no guarding or rebound.   Musculoskeletal:         General: Normal range of motion.      Cervical back: Neck supple.      Right lower le+ Pitting Edema present.      Left lower le+ Pitting Edema present.   Skin:     General: Skin is warm and dry.      Capillary Refill: Capillary refill takes less than 2 seconds.      Findings: No rash or wound.   Neurological:       General: No focal deficit present.      Mental Status: He is alert and oriented to person, place, and time. Mental status is at baseline.         Results Reviewed       Procedure Component Value Units Date/Time    HS Troponin I 4hr [[id number]]     Lab Status: No result Specimen: Blood     Protime-INR [[id number]]  (Abnormal) Collected: 04/19/25 1351    Lab Status: Final result Specimen: Blood from Arm, Right Updated: 04/19/25 1410     Protime 37.3 seconds      INR 3.79    Narrative:      INR Therapeutic Range    Indication                                             INR Range      Atrial Fibrillation                                               2.0-3.0  Hypercoagulable State                                    2.0.2.3  Left Ventricular Asist Device                            2.0-3.0  Mechanical Heart Valve                                  -    Aortic(with afib, MI, embolism, HF, LA enlargement,    and/or coagulopathy)                                     2.0-3.0 (2.5-3.5)     Mitral                                                             2.5-3.5  Prosthetic/Bioprosthetic Heart Valve               2.0-3.0  Venous thromboembolism (VTE: VT, PE        2.0-3.0    APTT [[id number]]  (Abnormal) Collected: 04/19/25 1351    Lab Status: Final result Specimen: Blood from Arm, Right Updated: 04/19/25 1410     PTT 43 seconds     HS Troponin I 2hr [[id number]] Updated: 04/19/25 1359    Lab Status: No result Specimen: Blood from Arm, Right     TSH, 3rd generation with Free T4 reflex [[id number]]  (Normal) Collected: 04/19/25 1255    Lab Status: Final result Specimen: Blood from Arm, Right Updated: 04/19/25 1349     TSH 3RD GENERATON 2.114 uIU/mL     HS Troponin 0hr (reflex protocol) [[id number]]  (Abnormal) Collected: 04/19/25 1255    Lab Status: Final result Specimen: Blood from Arm, Right Updated: 04/19/25 1323     hs TnI 0hr 202 ng/L     B-Type Natriuretic Peptide(BNP) [[id number]]  (Abnormal) Collected: 04/19/25 1255    Lab  Status: Final result Specimen: Blood from Arm, Right Updated: 04/19/25 1322     BNP 3,092 pg/mL     Comprehensive metabolic panel [[id number]]  (Abnormal) Collected: 04/19/25 1255    Lab Status: Final result Specimen: Blood from Arm, Right Updated: 04/19/25 1316     Sodium 142 mmol/L      Potassium 4.5 mmol/L      Chloride 108 mmol/L      CO2 30 mmol/L      ANION GAP 4 mmol/L      BUN 29 mg/dL      Creatinine 1.32 mg/dL      Glucose 106 mg/dL      Calcium 9.2 mg/dL      AST 26 U/L      ALT 16 U/L      Alkaline Phosphatase 52 U/L      Total Protein 7.0 g/dL      Albumin 4.2 g/dL      Total Bilirubin 1.18 mg/dL      eGFR 47 ml/min/1.73sq m     Narrative:      [employer] guidelines for Chronic Kidney Disease (CKD):     Stage 1 with normal or high GFR (GFR > 90 mL/min/1.73 square meters)    Stage 2 Mild CKD (GFR = 60-89 mL/min/1.73 square meters)    Stage 3A Moderate CKD (GFR = 45-59 mL/min/1.73 square meters)    Stage 3B Moderate CKD (GFR = 30-44 mL/min/1.73 square meters)    Stage 4 Severe CKD (GFR = 15-29 mL/min/1.73 square meters)    Stage 5 End Stage CKD (GFR <15 mL/min/1.73 square meters)  Note: GFR calculation is accurate only with a steady state creatinine    Lipase [[id number]]  (Normal) Collected: 04/19/25 1255    Lab Status: Final result Specimen: Blood from Arm, Right Updated: 04/19/25 1316     Lipase 19 u/L     CBC and differential [[id number]]  (Abnormal) Collected: 04/19/25 1255    Lab Status: Final result Specimen: Blood from Arm, Right Updated: 04/19/25 1301     WBC 5.76 Thousand/uL      RBC 4.51 Million/uL      Hemoglobin 13.0 g/dL      Hematocrit 42.8 %      MCV 95 fL      MCH 28.8 pg      MCHC 30.4 g/dL      RDW 15.5 %      MPV 12.2 fL      Platelets 148 Thousands/uL      nRBC 0 /100 WBCs      Segmented % 62 %      Immature Grans % 1 %      Lymphocytes % 22 %      Monocytes % 12 %      Eosinophils Relative 2 %      Basophils Relative 1 %      Absolute Neutrophils 3.62  Thousands/µL      Absolute Immature Grans 0.03 Thousand/uL      Absolute Lymphocytes 1.27 Thousands/µL      Absolute Monocytes 0.69 Thousand/µL      Eosinophils Absolute 0.11 Thousand/µL      Basophils Absolute 0.04 Thousands/µL             XR chest 2 views   ED Interpretation by SHAWNA Abreu (04/19 2713)   Abnormal   Enlarged cardiac silhouette, no other acute cardiopulmonary disease identified by me.      Final Interpretation by Terrance Carpenter MD (04/19 6415)      No focal consolidation, pleural effusion, or pneumothorax.            Resident: Bassem Carpenter I, the attending radiologist, have reviewed the images and agree with the final report above.      Workstation performed: [id number]             ECG 12 Lead Documentation Only    Date/Time: 4/19/2025 1:18 PM    Performed by: SHAWNA Abreu  Authorized by: SHAWNA Abreu    Indications / Diagnosis:  Shortness of breath  ECG reviewed by me, the ED Provider: yes    Patient location:  ED  Previous ECG:     Previous ECG:  Compared to current    Comparison ECG info:  February 27, 2025    Similarity:  No change    Comparison to cardiac monitor: Yes    Interpretation:     Interpretation: non-specific    Rate:     ECG rate:  77    ECG rate assessment: normal    Rhythm:     Rhythm: paced    Ectopy:     Ectopy: none    QRS:     QRS axis:  Left    QRS intervals:  Wide  Conduction:     Conduction: abnormal    ST segments:     ST segments:  Non-specific  T waves:     T waves: non-specific    Comments:      Wide QRS, paced      ED Medication and Procedure Management   Prior to Admission Medications   Prescriptions Last Dose Informant Patient Reported? Taking?   albuterol (ProAir HFA) 90 mcg/act inhaler  Self No No   Sig: Inhale 2 puffs every 6 (six) hours as needed for wheezing or shortness of breath   atorvastatin (LIPITOR) 10 mg tablet  Self Yes No   Sig: Take by mouth   ferrous sulfate 325 (65 Fe) mg tablet  Self Yes No   Sig: Take 325 mg by  mouth   furosemide (LASIX) 20 mg tablet  Self Yes No   Sig: Take by mouth   isosorbide mononitrate (IMDUR) 30 mg 24 hr tablet  Self No No   Sig: Take 1 tablet (30 mg total) by mouth daily   lisinopril (ZESTRIL) 5 mg tablet  Self Yes No   Sig: Take 5 mg by mouth daily   metoprolol tartrate (LOPRESSOR) 25 mg tablet  Self Yes No   Sig: Take 25 mg by mouth every 12 (twelve) hours   potassium chloride (K-DUR,KLOR-CON) 20 mEq tablet  Self Yes No   Sig: Take 20 mEq by mouth daily   ranolazine (RANEXA) 500 mg 12 hr tablet  Self No No   Sig: Take 1 tablet (500 mg total) by mouth every 12 (twelve) hours   tetrahydrozoline (VISINE) 0.05 % ophthalmic solution  Self Yes No   Sig: Administer 1 drop to both eyes every morning   warfarin (COUMADIN) 5 mg tablet  Self Yes No   Sig: Take 5 mg by mouth daily      Facility-Administered Medications: None     Patient's Medications   Discharge Prescriptions    No medications on file     No discharge procedures on file.  ED SEPSIS DOCUMENTATION   Time reflects when diagnosis was documented in both MDM as applicable and the Disposition within this note       Time User Action Codes Description Comment    4/19/2025  2:54 PM Pearl Martin [I50.9] CHF exacerbation (HCC)     4/19/2025  2:55 PM Pearl Martin [R06.09] DELGADILLO (dyspnea on exertion)                  SHAWNA Abreu  04/19/25 2058

## 2025-04-19 NOTE — ASSESSMENT & PLAN NOTE
Patient with history of mitral valve replacement 20 years ago  Patient is on warfarin 5 mg daily except Sunday 2.5  INR today 3.7 with goal of 2.5-3.5  Will hold today's warfarin  Follow-up on INR and restart warfarin as needed

## 2025-04-19 NOTE — H&P
H&P - Hospitalist   Name: Larry Jenkins 89 y.o. male I MRN: [id number]  Unit/Bed#: -01 I Date of Admission: 4/19/2025   Date of Service: 4/19/2025 I Hospital Day: 0     Assessment & Plan  Acute exacerbation of CHF (congestive heart failure) (HCC)  Wt Readings from Last 3 Encounters:   04/19/25 78.1 kg (172 lb 2.9 oz)   04/15/25 78.5 kg (173 lb)   03/07/25 79.4 kg (175 lb)   Patient presented with shortness of breath for several weeks  Patient was admitted in February due to similar symptoms  Troponins 202> 215, BNP 3092  Patient with positive JVD on exam  During last admission patient refused echo and any intervention  After discussion with the son patient is agreeable to echocardiogram and admission  Patient has a history of mitral valve replacement, CABG and pacemaker 20 years ago  EKG with paced rhythm  Echo in 2018 showed left ventricular ejection fraction of 40 to 45%  Patient is on home 20 mg Lasix daily  Plan  Continue IV 40 Lasix twice daily  Continue metoprolol  Hold lisinopril due to elevated creatinine  Continue telemetry monitor  Strict I's and O's  Fluid restriction 1500  Salt restrictions  Follow-up on echocardiogram  Follow-up on TSH    Elevated troponin  Patient presented with elevated troponin  Troponins 202> 215  Patient with chest pain-free  Probably increased demand in setting of acute CHF  Continue to trend troponin  EKG for any chest pain  Patient stopped taking Imdur and Ranexa as it gives him constipation  Will continue to hold Imdur and Ranexa  History of mitral valve replacement  Patient with history of mitral valve replacement 20 years ago  Patient is on warfarin 5 mg daily except Sunday 2.5  INR today 3.7 with goal of 2.5-3.5  Will hold today's warfarin  Follow-up on INR and restart warfarin as needed  Hypertension  Patient with history of hypertension on lisinopril and metoprolol  Continue metoprolol  Hold lisinopril in setting of elevated creatinine level  CKD (chronic kidney  disease)  Lab Results   Component Value Date    EGFR 47 04/19/2025    EGFR 50 02/28/2025    EGFR 50 02/27/2025    CREATININE 1.32 (H) 04/19/2025    CREATININE 1.26 02/28/2025    CREATININE 1.26 02/27/2025   Patient with past medical history of CKD  Patient creatinine on admission 1.32  Probably in setting of CHF with cardiorenal etiology  Avoid nephrotoxic agents  Continue IV diuresis  Continue to monitor creatinine  Pacemaker  Patient has history of A-fib with pacemaker placed 20 years ago  Coronary artery disease  Patient with past med history of coronary disease status post CABG 20 years ago  Patient is on metoprolol and statin  Continue current regimen      VTE Pharmacologic Prophylaxis:   Moderate Risk (Score 3-4) - Pharmacological DVT Prophylaxis Ordered: warfarin (Coumadin).  Code Status: Level 3 - DNAR and DNI   Discussion with family: Updated  (son) at bedside.    Anticipated Length of Stay: Patient will be admitted on an inpatient basis with an anticipated length of stay of greater than 2 midnights secondary to heart failure exacerbation.    History of Present Illness   Chief Complaint: Shortness of breath    Larry Jenkins is a 89 y.o. male with a PMH of CABG 20 years ago, mitral valve replacement 20 years ago, CAD, A-fib status post pacemaker placed 20 years ago, hypertension, hyperlipidemia, heart failure with reduced EF who presents with shortness of breath.  Patient was admitted to the hospital in February due to similar symptoms however he refused echo and further treatment.  After discussion with the son patient is agreeable.  Patient reported shortness of breath from time to time.  Vitals in the ED blood pressure 128/82, respiration 18, heart rate 79, temperature nine 7.5.  Labs troponin 202> 215, BNP 3092, creatinine 1.32, platelet 148, INR 3.79.  Chest x-ray with done and unremarkable.  Patient will be admitted for heart failure exacerbation.  Patient is level 3 DNR/DNI    Review  of Systems   Constitutional:  Negative for chills, fatigue and fever.   HENT:  Negative for drooling, ear discharge, mouth sores, postnasal drip, sinus pain and trouble swallowing.    Eyes:  Negative for photophobia, pain and itching.   Respiratory:  Positive for shortness of breath. Negative for apnea and chest tightness.    Cardiovascular:  Positive for leg swelling. Negative for chest pain and palpitations.   Gastrointestinal:  Negative for abdominal pain, blood in stool, diarrhea, nausea and rectal pain.   Endocrine: Negative for heat intolerance, polyphagia and polyuria.   Genitourinary:  Negative for dysuria, flank pain and penile swelling.   Musculoskeletal:  Negative for arthralgias, gait problem, myalgias, neck pain and neck stiffness.   Neurological:  Negative for dizziness, syncope, speech difficulty, weakness, light-headedness and headaches.   Hematological:  Negative for adenopathy.   Psychiatric/Behavioral:  Negative for agitation, confusion and self-injury. The patient is not hyperactive.        Historical Information   Past Medical History:   Diagnosis Date    Amputation of toe ([facility])     3rd toe;  accident    Anemia     Arthritis     Coronary artery disease     Depression     Myocardial infarction (HCC) 2000    Palliative care patient 02/07/2024    Shingles      Past Surgical History:   Procedure Laterality Date    CARDIAC VALVE REPLACEMENT  2000    CATARACT EXTRACTION, BILATERAL      COLONOSCOPY N/A 11/15/2018    Procedure: COLONOSCOPY;  Surgeon: Daly Celeste MD;  Location:  MAIN OR;  Service: Gastroenterology    CORONARY ARTERY BYPASS GRAFT      ESOPHAGOGASTRODUODENOSCOPY N/A 11/14/2018    Procedure: ESOPHAGOGASTRODUODENOSCOPY (EGD);  Surgeon: Zhang Cassidy MD;  Location:  MAIN OR;  Service: Gastroenterology    EYE SURGERY      Cataracts    MITRAL VALVE REPLACEMENT       Social History     Tobacco Use    Smoking status: Former     Current packs/day: 0.00     Types: Cigarettes      Quit date: 1985     Years since quittin.4     Passive exposure: Past    Smokeless tobacco: Never   Vaping Use    Vaping status: Never Used   Substance and Sexual Activity    Alcohol use: Yes     Comment: occasional    Drug use: No    Sexual activity: Not on file     E-Cigarette/Vaping    E-Cigarette Use Never User      E-Cigarette/Vaping Substances    Nicotine No     THC No     CBD No     Flavoring No     Other No     Unknown No      Family history non-contributory  Social History:  Marital Status: /Civil Union       Meds/Allergies   I have reviewed home medications with patient personally.  Prior to Admission medications    Medication Sig Start Date End Date Taking? Authorizing Provider   albuterol (ProAir HFA) 90 mcg/act inhaler Inhale 2 puffs every 6 (six) hours as needed for wheezing or shortness of breath 10/14/24   SHAWNA Fisher   atorvastatin (LIPITOR) 10 mg tablet Take by mouth 7/27/10   Historical Provider, MD   ferrous sulfate 325 (65 Fe) mg tablet Take 325 mg by mouth    Historical Provider, MD   furosemide (LASIX) 20 mg tablet Take by mouth 7/27/10   Historical Provider, MD   isosorbide mononitrate (IMDUR) 30 mg 24 hr tablet Take 1 tablet (30 mg total) by mouth daily 3/1/25   Marcia Hebert MD   lisinopril (ZESTRIL) 5 mg tablet Take 5 mg by mouth daily 11   Historical Provider, MD   metoprolol tartrate (LOPRESSOR) 25 mg tablet Take 25 mg by mouth every 12 (twelve) hours 11   Historical Provider, MD   potassium chloride (K-DUR,KLOR-CON) 20 mEq tablet Take 20 mEq by mouth daily 11   Historical Provider, MD   ranolazine (RANEXA) 500 mg 12 hr tablet Take 1 tablet (500 mg total) by mouth every 12 (twelve) hours 25   Marcia Hebert MD   tetrahydrozoline (VISINE) 0.05 % ophthalmic solution Administer 1 drop to both eyes every morning    Historical Provider, MD   warfarin (COUMADIN) 5 mg tablet Take 5 mg by mouth daily 10/10/11   Historical  Provider, MD     Allergies   Allergen Reactions    Adhesive [Medical Tape] Rash       Objective :  Temp:  [97.3 °F (36.3 °C)-97.5 °F (36.4 °C)] 97.3 °F (36.3 °C)  HR:  [76-79] 76  BP: (128-137)/(69-82) 137/79  Resp:  [14-18] 14  SpO2:  [95 %-99 %] 95 %  O2 Device: None (Room air)    Physical Exam  Constitutional:       General: He is not in acute distress.     Appearance: He is not ill-appearing, toxic-appearing or diaphoretic.   HENT:      Head: Normocephalic and atraumatic.      Nose: Nose normal. No congestion or rhinorrhea.   Cardiovascular:      Rate and Rhythm: Normal rate and regular rhythm.      Pulses: Normal pulses.      Heart sounds: No murmur heard.     No friction rub. No gallop.      Comments: Positive JVD  Pulmonary:      Effort: Pulmonary effort is normal. No respiratory distress.      Breath sounds: No stridor. No wheezing or rhonchi.   Abdominal:      General: Abdomen is flat. There is no distension.      Palpations: There is no mass.      Tenderness: There is no abdominal tenderness.      Hernia: No hernia is present.   Musculoskeletal:         General: No swelling, tenderness, deformity or signs of injury. Normal range of motion.   Skin:     General: Skin is warm.      Coloration: Skin is not jaundiced or pale.      Findings: No bruising or erythema.   Neurological:      General: No focal deficit present.      Mental Status: He is alert.      Cranial Nerves: No cranial nerve deficit.      Sensory: No sensory deficit.      Motor: No weakness.      Coordination: Coordination normal.   Psychiatric:         Mood and Affect: Mood normal.         Behavior: Behavior normal.         Thought Content: Thought content normal.         Judgment: Judgment normal.               Lab Results: I have reviewed the following results:  Results from last 7 days   Lab Units 04/19/25  1255   WBC Thousand/uL 5.76   HEMOGLOBIN g/dL 13.0   HEMATOCRIT % 42.8   PLATELETS Thousands/uL 148*   SEGS PCT % 62   LYMPHO PCT % 22    MONO PCT % 12   EOS PCT % 2     Results from last 7 days   Lab Units 04/19/25  1255   SODIUM mmol/L 142   POTASSIUM mmol/L 4.5   CHLORIDE mmol/L 108   CO2 mmol/L 30   BUN mg/dL 29*   CREATININE mg/dL 1.32*   ANION GAP mmol/L 4   CALCIUM mg/dL 9.2   ALBUMIN g/dL 4.2   TOTAL BILIRUBIN mg/dL 1.18*   ALK PHOS U/L 52   ALT U/L 16   AST U/L 26   GLUCOSE RANDOM mg/dL 106     Results from last 7 days   Lab Units 04/19/25  1351   INR  3.79*         Lab Results   Component Value Date    HGBA1C 5.7 10/15/2018    HGBA1C 5.6 04/03/2018    HGBA1C 5.8 09/28/2017           Imaging Results Review: I reviewed radiology reports from this admission including: chest xray.  Other Study Results Review: EKG was reviewed.     Administrative Statements   I have spent a total time of 60 minutes in caring for this patient on the day of the visit/encounter including Diagnostic results, Prognosis, and Risks and benefits of tx options.    ** Please Note: This note has been constructed using a voice recognition system. **

## 2025-04-19 NOTE — ASSESSMENT & PLAN NOTE
Patient presented with elevated troponin  Troponins 202> 215  Patient with chest pain-free  Probably increased demand in setting of acute CHF  Continue to trend troponin  EKG for any chest pain  Patient stopped taking Imdur and Ranexa as it gives him constipation  Will continue to hold Imdur and Ranexa

## 2025-04-19 NOTE — ASSESSMENT & PLAN NOTE
Patient with past med history of coronary disease status post CABG 20 years ago  Patient is on metoprolol and statin  Continue current regimen

## 2025-04-19 NOTE — ASSESSMENT & PLAN NOTE
Lab Results   Component Value Date    EGFR 47 04/19/2025    EGFR 50 02/28/2025    EGFR 50 02/27/2025    CREATININE 1.32 (H) 04/19/2025    CREATININE 1.26 02/28/2025    CREATININE 1.26 02/27/2025   Patient with past medical history of CKD  Patient creatinine on admission 1.32  Probably in setting of CHF with cardiorenal etiology  Avoid nephrotoxic agents  Continue IV diuresis  Continue to monitor creatinine

## 2025-04-19 NOTE — ASSESSMENT & PLAN NOTE
Wt Readings from Last 3 Encounters:   04/19/25 78.1 kg (172 lb 2.9 oz)   04/15/25 78.5 kg (173 lb)   03/07/25 79.4 kg (175 lb)   Patient presented with shortness of breath for several weeks  Patient was admitted in February due to similar symptoms  Troponins 202> 215, BNP 3092  Patient with positive JVD on exam  During last admission patient refused echo and any intervention  After discussion with the son patient is agreeable to echocardiogram and admission  Patient has a history of mitral valve replacement, CABG and pacemaker 20 years ago  EKG with paced rhythm  Echo in 2018 showed left ventricular ejection fraction of 40 to 45%  Patient is on home 20 mg Lasix daily  Plan  Continue IV 40 Lasix twice daily  Continue metoprolol  Hold lisinopril due to elevated creatinine  Continue telemetry monitor  Strict I's and O's  Fluid restriction 1500  Salt restrictions  Follow-up on echocardiogram  Follow-up on TSH

## 2025-04-20 LAB
ALBUMIN SERPL BCG-MCNC: 3.7 G/DL (ref 3.5–5)
ALP SERPL-CCNC: 46 U/L (ref 34–104)
ALT SERPL W P-5'-P-CCNC: 14 U/L (ref 7–52)
ANION GAP SERPL CALCULATED.3IONS-SCNC: 8 MMOL/L (ref 4–13)
AST SERPL W P-5'-P-CCNC: 23 U/L (ref 13–39)
ATRIAL RATE: 66 BPM
BILIRUB SERPL-MCNC: 1.13 MG/DL (ref 0.2–1)
BUN SERPL-MCNC: 30 MG/DL (ref 5–25)
CALCIUM SERPL-MCNC: 8.7 MG/DL (ref 8.4–10.2)
CHLORIDE SERPL-SCNC: 106 MMOL/L (ref 96–108)
CO2 SERPL-SCNC: 29 MMOL/L (ref 21–32)
CREAT SERPL-MCNC: 1.25 MG/DL (ref 0.6–1.3)
ERYTHROCYTE [DISTWIDTH] IN BLOOD BY AUTOMATED COUNT: 15.2 % (ref 11.6–15.1)
FLUAV RNA RESP QL NAA+PROBE: NEGATIVE
FLUBV RNA RESP QL NAA+PROBE: NEGATIVE
GFR SERPL CREATININE-BSD FRML MDRD: 50 ML/MIN/1.73SQ M
GLUCOSE P FAST SERPL-MCNC: 87 MG/DL (ref 65–99)
GLUCOSE SERPL-MCNC: 87 MG/DL (ref 65–140)
HCT VFR BLD AUTO: 38.8 % (ref 36.5–49.3)
HGB BLD-MCNC: 12.2 G/DL (ref 12–17)
INR PPP: 3.48 (ref 0.85–1.19)
MAGNESIUM SERPL-MCNC: 2.1 MG/DL (ref 1.9–2.7)
MCH RBC QN AUTO: 29.2 PG (ref 26.8–34.3)
MCHC RBC AUTO-ENTMCNC: 31.4 G/DL (ref 31.4–37.4)
MCV RBC AUTO: 93 FL (ref 82–98)
PHOSPHATE SERPL-MCNC: 3.7 MG/DL (ref 2.3–4.1)
PLATELET # BLD AUTO: 129 THOUSANDS/UL (ref 149–390)
PMV BLD AUTO: 11.9 FL (ref 8.9–12.7)
POTASSIUM SERPL-SCNC: 3.9 MMOL/L (ref 3.5–5.3)
PROT SERPL-MCNC: 6.1 G/DL (ref 6.4–8.4)
PROTHROMBIN TIME: 35 SECONDS (ref 12.3–15)
QRS AXIS: -78 DEGREES
QRSD INTERVAL: 162 MS
QT INTERVAL: 504 MS
QTC INTERVAL: 570 MS
RBC # BLD AUTO: 4.18 MILLION/UL (ref 3.88–5.62)
RSV RNA RESP QL NAA+PROBE: NEGATIVE
SARS-COV-2 RNA RESP QL NAA+PROBE: NEGATIVE
SODIUM SERPL-SCNC: 143 MMOL/L (ref 135–147)
T WAVE AXIS: 102 DEGREES
VENTRICULAR RATE: 77 BPM
WBC # BLD AUTO: 5.05 THOUSAND/UL (ref 4.31–10.16)

## 2025-04-20 PROCEDURE — 80053 COMPREHEN METABOLIC PANEL: CPT

## 2025-04-20 PROCEDURE — 85027 COMPLETE CBC AUTOMATED: CPT

## 2025-04-20 PROCEDURE — 0241U HB NFCT DS VIR RESP RNA 4 TRGT: CPT | Performed by: INTERNAL MEDICINE

## 2025-04-20 PROCEDURE — 83735 ASSAY OF MAGNESIUM: CPT

## 2025-04-20 PROCEDURE — 99232 SBSQ HOSP IP/OBS MODERATE 35: CPT | Performed by: INTERNAL MEDICINE

## 2025-04-20 PROCEDURE — 84100 ASSAY OF PHOSPHORUS: CPT

## 2025-04-20 PROCEDURE — 93010 ELECTROCARDIOGRAM REPORT: CPT | Performed by: INTERNAL MEDICINE

## 2025-04-20 PROCEDURE — 85610 PROTHROMBIN TIME: CPT

## 2025-04-20 RX ORDER — FUROSEMIDE 10 MG/ML
60 INJECTION INTRAMUSCULAR; INTRAVENOUS
Status: DISCONTINUED | OUTPATIENT
Start: 2025-04-20 | End: 2025-04-20

## 2025-04-20 RX ORDER — FUROSEMIDE 10 MG/ML
40 INJECTION INTRAMUSCULAR; INTRAVENOUS
Status: DISCONTINUED | OUTPATIENT
Start: 2025-04-20 | End: 2025-04-21

## 2025-04-20 RX ADMIN — POTASSIUM CHLORIDE 20 MEQ: 1500 TABLET, EXTENDED RELEASE ORAL at 08:05

## 2025-04-20 RX ADMIN — WARFARIN SODIUM 2.5 MG: 2.5 TABLET ORAL at 17:31

## 2025-04-20 RX ADMIN — FUROSEMIDE 40 MG: 10 INJECTION, SOLUTION INTRAVENOUS at 17:31

## 2025-04-20 RX ADMIN — METOPROLOL TARTRATE 25 MG: 25 TABLET, FILM COATED ORAL at 21:26

## 2025-04-20 RX ADMIN — FERROUS SULFATE TAB 325 MG (65 MG ELEMENTAL FE) 325 MG: 325 (65 FE) TAB at 08:05

## 2025-04-20 RX ADMIN — TETRAHYDROZOLINE HCL 1 DROP: 0.05 SOLUTION/ DROPS OPHTHALMIC at 08:03

## 2025-04-20 RX ADMIN — FUROSEMIDE 40 MG: 10 INJECTION, SOLUTION INTRAVENOUS at 08:05

## 2025-04-20 RX ADMIN — METOPROLOL TARTRATE 25 MG: 25 TABLET, FILM COATED ORAL at 08:05

## 2025-04-20 RX ADMIN — ATORVASTATIN CALCIUM 10 MG: 10 TABLET, FILM COATED ORAL at 17:31

## 2025-04-20 NOTE — ASSESSMENT & PLAN NOTE
Patient with history of mitral valve replacement 20 years ago  Patient is on warfarin 5 mg daily except Sunday 2.5  Follow-up on INR and restart warfarin

## 2025-04-20 NOTE — PLAN OF CARE
Problem: Potential for Falls  Goal: Patient will remain free of falls  Description: INTERVENTIONS:- Educate patient/family on patient safety including physical limitations- Instruct patient to call for assistance with activity - Consult OT/PT to assist with strengthening/mobility - Keep Call bell within reach- Keep bed low and locked with side rails adjusted as appropriate- Keep care items and personal belongings within reach- Initiate and maintain comfort rounds- Make Fall Risk Sign visible to staff- Offer Toileting every 2 Hours, in advance of need- Initiate/Maintain bed alarm- Obtain necessary fall risk management equipment: socks- Apply yellow socks and bracelet for high fall risk patients- Consider moving patient to room near nurses station  INTERVENTIONS:- Educate patient/family on patient safety including physical limitations- Instruct patient to call for assistance with activity - Consult OT/PT to assist with strengthening/mobility - Keep Call bell within reach- Keep bed low and locked with side rails adjusted as appropriate- Keep care items and personal belongings within reach- Initiate and maintain comfort rounds- Make Fall Risk Sign visible to staff- Offer Toileting every 2 Hours, in advance of need- Initiate/Maintain bed alarm- Obtain necessary fall risk management equipment: socks- Apply yellow socks and bracelet for high fall risk patients- Consider moving patient to room near nurses station  Outcome: Progressing     Problem: PAIN - ADULT  Goal: Verbalizes/displays adequate comfort level or baseline comfort level  Description: Interventions:- Encourage patient to monitor pain and request assistance- Assess pain using appropriate pain scale- Administer analgesics based on type and severity of pain and evaluate response- Implement non-pharmacological measures as appropriate and evaluate response- Consider cultural and social influences on pain and pain management- Notify physician/advanced practitioner  if interventions unsuccessful or patient reports new pain  Outcome: Progressing     Problem: INFECTION - ADULT  Goal: Absence or prevention of progression during hospitalization  Description: INTERVENTIONS:- Assess and monitor for signs and symptoms of infection- Monitor lab/diagnostic results- Monitor all insertion sites, i.e. indwelling lines, tubes, and drains- Monitor endotracheal if appropriate and nasal secretions for changes in amount and color- [place] appropriate cooling/warming therapies per order- Administer medications as ordered- Instruct and encourage patient and family to use good hand hygiene technique- Identify and instruct in appropriate isolation precautions for identified infection/condition  Outcome: Progressing  Goal: Absence of fever/infection during neutropenic period  Description: INTERVENTIONS:- Monitor WBC  Outcome: Progressing

## 2025-04-20 NOTE — ASSESSMENT & PLAN NOTE
Patient presented with elevated troponin  Probably increased demand in setting of acute CHF  EKG for any chest pain  Patient stopped taking Imdur and Ranexa as it gives him constipation- continue to hold Imdur and Ranexa

## 2025-04-20 NOTE — PROGRESS NOTES
Progress Note - Hospitalist   Name: Larry Jenkins 89 y.o. male I MRN: [id number]  Unit/Bed#: -01 I Date of Admission: 4/19/2025   Date of Service: 4/20/2025 I Hospital Day: 0    Assessment & Plan  Acute exacerbation of CHF (congestive heart failure) (HCC)  Wt Readings from Last 3 Encounters:   04/19/25 78.1 kg (172 lb 2.9 oz)   04/15/25 78.5 kg (173 lb)   03/07/25 79.4 kg (175 lb)   Patient presented with shortness of breath for several weeks, admitted in February due to similar symptoms  Troponins 202> 215, BNP 3092  EKG with paced rhythm  Echo in 2018 showed left ventricular ejection fraction of 40 to 45%, repeat ECHO  Patient is on home 20 mg Lasix daily  Plan  Continue IV lasix- increased to 60 mg  Continue metoprolol  Hold lisinopril due to elevated creatinine  Continue telemetry monitor  Strict I's and O's  Fluid and salt restrictions   echocardiogram  Elevated troponin  Patient presented with elevated troponin  Probably increased demand in setting of acute CHF  EKG for any chest pain  Patient stopped taking Imdur and Ranexa as it gives him constipation- continue to hold Imdur and Ranexa  History of mitral valve replacement  Patient with history of mitral valve replacement 20 years ago  Patient is on warfarin 5 mg daily except Sunday 2.5  Follow-up on INR and restart warfarin   Hypertension  on lisinopril and metoprolol  Continue metoprolol  Hold lisinopril in setting of elevated creatinine level  CKD (chronic kidney disease)  Lab Results   Component Value Date    EGFR 50 04/20/2025    EGFR 47 04/19/2025    EGFR 50 02/28/2025    CREATININE 1.25 04/20/2025    CREATININE 1.32 (H) 04/19/2025    CREATININE 1.26 02/28/2025   Patient with past medical history of CKD  Patient creatinine on admission 1.32  Probably in setting of CHF with cardiorenal etiology  Avoid nephrotoxic agents  Continue IV diuresis  Continue to monitor creatinine, I/O  Pacemaker  Patient has history of A-fib with pacemaker placed 20  years ago  Coronary artery disease  Patient with past med history of coronary disease status post CABG 20 years ago  Patient is on metoprolol and statin  Continue current regimen    VTE Pharmacologic Prophylaxis:   Moderate Risk (Score 3-4) - Pharmacological DVT Prophylaxis Ordered: warfarin (Coumadin).    Mobility:   Basic Mobility Inpatient Raw Score: 21  JH-HLM Goal: 6: Walk 10 steps or more  JH-HLM Achieved: 6: Walk 10 steps or more  JH-HLM Goal achieved. Continue to encourage appropriate mobility.    Patient Centered Rounds: I performed bedside rounds with nursing staff today.   Discussions with Specialists or Other Care Team Provider: nursing    Education and Discussions with Family / Patient: Attempted to update  (son) via phone. Unable to contact.- VELIA    Current Length of Stay: 0 day(s)  Current Patient Status: Inpatient   Certification Statement: The patient will continue to require additional inpatient hospital stay due to cardiology consult, I vdiuresis  Discharge Plan: Anticipate discharge in 24-48 hrs to discharge location to be determined pending rehab evaluations.    Code Status: Level 3 - DNAR and DNI    Subjective   C/o SOB- not positional, has all the time with exertion, rest and lying. Denies cough, chest pain    Objective :  Temp:  [97.3 °F (36.3 °C)-97.9 °F (36.6 °C)] 97.9 °F (36.6 °C)  HR:  [73-92] 92  BP: (108-151)/(69-79) 151/78  Resp:  [14-18] 18  SpO2:  [90 %-97 %] 92 %  O2 Device: None (Room air)    Body mass index is 24.01 kg/m².     Input and Output Summary (last 24 hours):     Intake/Output Summary (Last 24 hours) at 4/20/2025 1435  Last data filed at 4/20/2025 1300  Gross per 24 hour   Intake 650 ml   Output 200 ml   Net 450 ml       Physical Exam  Vitals and nursing note reviewed.   HENT:      Mouth/Throat:      Mouth: Mucous membranes are moist.      Pharynx: Oropharynx is clear.   Eyes:      Conjunctiva/sclera: Conjunctivae normal.   Neck:      Comments: No JVD,  hepato jugular reflex  Cardiovascular:      Rate and Rhythm: Normal rate.   Pulmonary:      Effort: Pulmonary effort is normal.      Breath sounds: Rales present.   Abdominal:      General: Bowel sounds are normal.      Palpations: Abdomen is soft.   Skin:     General: Skin is warm and dry.      Capillary Refill: Capillary refill takes 2 to 3 seconds.   Neurological:      Mental Status: He is alert and oriented to person, place, and time.           Lines/Drains:        Telemetry:  Telemetry Orders (From admission, onward)               24 Hour Telemetry Monitoring  Continuous x 24 Hours (Telem)        Expiring   Question:  Reason for 24 Hour Telemetry  Answer:  Decompensated CHF- and any one of the following: continuous diuretic infusion or total diuretic dose >200 mg daily, associated electrolyte derangement (I.e. K < 3.0), inotropic drip (continuous infusion), hx of ventricular arrhythmia, or new EF < 35%                     Telemetry Reviewed:  reviewed  Indication for Continued Telemetry Use: Arrthymias requiring medical therapy               Lab Results: I have reviewed the following results:   Results from last 7 days   Lab Units 04/20/25  0446 04/19/25  1255   WBC Thousand/uL 5.05 5.76   HEMOGLOBIN g/dL 12.2 13.0   HEMATOCRIT % 38.8 42.8   PLATELETS Thousands/uL 129* 148*   SEGS PCT %  --  62   LYMPHO PCT %  --  22   MONO PCT %  --  12   EOS PCT %  --  2     Results from last 7 days   Lab Units 04/20/25  0446   SODIUM mmol/L 143   POTASSIUM mmol/L 3.9   CHLORIDE mmol/L 106   CO2 mmol/L 29   BUN mg/dL 30*   CREATININE mg/dL 1.25   ANION GAP mmol/L 8   CALCIUM mg/dL 8.7   ALBUMIN g/dL 3.7   TOTAL BILIRUBIN mg/dL 1.13*   ALK PHOS U/L 46   ALT U/L 14   AST U/L 23   GLUCOSE RANDOM mg/dL 87     Results from last 7 days   Lab Units 04/20/25  0446   INR  3.48*                   Recent Cultures (last 7 days):         Imaging Results Review: I reviewed radiology reports from this admission including: chest xray.  Other  Study Results Review: No additional pertinent studies reviewed.    Last 24 Hours Medication List:     Current Facility-Administered Medications:     albuterol (PROVENTIL HFA,VENTOLIN HFA) inhaler 2 puff, Q6H PRN    atorvastatin (LIPITOR) tablet 10 mg, Daily With Dinner    ferrous sulfate tablet 325 mg, Daily With Breakfast    furosemide (LASIX) injection 60 mg, BID (diuretic)    [Held by provider] lisinopril (ZESTRIL) tablet 5 mg, Daily    metoprolol tartrate (LOPRESSOR) tablet 25 mg, Q12H ETELVINA    potassium chloride (Klor-Con M20) CR tablet 20 mEq, Daily    tetrahydrozoline (VISINE) 0.05 % ophthalmic solution 1 drop, QAM    warfarin (COUMADIN) tablet 2.5 mg, Every Sunday    [START ON 4/21/2025] warfarin (COUMADIN) tablet 5 mg, Once per day on Monday Tuesday Wednesday Thursday Friday Saturday    Administrative Statements   Today, Patient Was Seen By: Susan Membreno MD  I have spent a total time of 45 minutes in caring for this patient on the day of the visit/encounter including Diagnostic results, Impressions, Counseling / Coordination of care, Documenting in the medical record, Reviewing/placing orders in the medical record (including tests, medications, and/or procedures), Obtaining or reviewing history  , and Communicating with other healthcare professionals .    **Please Note: This note may have been constructed using a voice recognition system.**

## 2025-04-20 NOTE — ASSESSMENT & PLAN NOTE
Wt Readings from Last 3 Encounters:   04/19/25 78.1 kg (172 lb 2.9 oz)   04/15/25 78.5 kg (173 lb)   03/07/25 79.4 kg (175 lb)   Patient presented with shortness of breath for several weeks, admitted in February due to similar symptoms  Troponins 202> 215, BNP 3092  EKG with paced rhythm  Echo in 2018 showed left ventricular ejection fraction of 40 to 45%, repeat ECHO  Patient is on home 20 mg Lasix daily  Plan  Continue IV lasix- increased to 60 mg  Continue metoprolol  Hold lisinopril due to elevated creatinine  Continue telemetry monitor  Strict I's and O's  Fluid and salt restrictions   echocardiogram

## 2025-04-20 NOTE — ASSESSMENT & PLAN NOTE
on lisinopril and metoprolol  Continue metoprolol  Hold lisinopril in setting of elevated creatinine level

## 2025-04-20 NOTE — ASSESSMENT & PLAN NOTE
Lab Results   Component Value Date    EGFR 50 04/20/2025    EGFR 47 04/19/2025    EGFR 50 02/28/2025    CREATININE 1.25 04/20/2025    CREATININE 1.32 (H) 04/19/2025    CREATININE 1.26 02/28/2025   Patient with past medical history of CKD  Patient creatinine on admission 1.32  Probably in setting of CHF with cardiorenal etiology  Avoid nephrotoxic agents  Continue IV diuresis  Continue to monitor creatinine, I/O

## 2025-04-20 NOTE — PLAN OF CARE
Problem: Potential for Falls  Goal: Patient will remain free of falls  Description: INTERVENTIONS:- Educate patient/family on patient safety including physical limitations- Instruct patient to call for assistance with activity - Consult OT/PT to assist with strengthening/mobility - Keep Call bell within reach- Keep bed low and locked with side rails adjusted as appropriate- Keep care items and personal belongings within reach- Initiate and maintain comfort rounds- Make Fall Risk Sign visible to staff- Offer Toileting every 2 Hours, in advance of need- Initiate/Maintain bed alarm- Obtain necessary fall risk management equipment: socks- Apply yellow socks and bracelet for high fall risk patients- Consider moving patient to room near nurses station  INTERVENTIONS:- Educate patient/family on patient safety including physical limitations- Instruct patient to call for assistance with activity - Consult OT/PT to assist with strengthening/mobility - Keep Call bell within reach- Keep bed low and locked with side rails adjusted as appropriate- Keep care items and personal belongings within reach- Initiate and maintain comfort rounds- Make Fall Risk Sign visible to staff- Offer Toileting every 2 Hours, in advance of need- Initiate/Maintain bed alarm- Obtain necessary fall risk management equipment: socks- Apply yellow socks and bracelet for high fall risk patients- Consider moving patient to room near nurses station  4/20/2025 0723 by Pretty Pearson RN  Outcome: Progressing  4/19/2025 2046 by Pretty Pearson, RN  Outcome: Progressing     Problem: PAIN - ADULT  Goal: Verbalizes/displays adequate comfort level or baseline comfort level  Description: Interventions:- Encourage patient to monitor pain and request assistance- Assess pain using appropriate pain scale- Administer analgesics based on type and severity of pain and evaluate response- Implement non-pharmacological measures as appropriate and evaluate response-  Consider cultural and social influences on pain and pain management- Notify physician/advanced practitioner if interventions unsuccessful or patient reports new pain  4/20/2025 0723 by Pretty Pearson RN  Outcome: Progressing  4/19/2025 2046 by Pretty Pearson RN  Outcome: Progressing     Problem: INFECTION - ADULT  Goal: Absence or prevention of progression during hospitalization  Description: INTERVENTIONS:- Assess and monitor for signs and symptoms of infection- Monitor lab/diagnostic results- Monitor all insertion sites, i.e. indwelling lines, tubes, and drains- Monitor endotracheal if appropriate and nasal secretions for changes in amount and color- [place] appropriate cooling/warming therapies per order- Administer medications as ordered- Instruct and encourage patient and family to use good hand hygiene technique- Identify and instruct in appropriate isolation precautions for identified infection/condition  4/20/2025 0723 by Pretty Pearson RN  Outcome: Progressing  4/19/2025 2046 by Pretty Pearson RN  Outcome: Progressing  Goal: Absence of fever/infection during neutropenic period  Description: INTERVENTIONS:- Monitor WBC  4/20/2025 0723 by Pretty Paerson RN  Outcome: Progressing  4/19/2025 2046 by Pretty Pearson RN  Outcome: Progressing     Problem: SAFETY ADULT  Goal: Patient will remain free of falls  Description: INTERVENTIONS:- Educate patient/family on patient safety including physical limitations- Instruct patient to call for assistance with activity - Consult OT/PT to assist with strengthening/mobility - Keep Call bell within reach- Keep bed low and locked with side rails adjusted as appropriate- Keep care items and personal belongings within reach- Initiate and maintain comfort rounds- Make Fall Risk Sign visible to staff- Offer Toileting every 2 Hours, in advance of need- Initiate/Maintain bed alarm- Obtain necessary fall risk management equipment: socks- Apply yellow socks and  bracelet for high fall risk patients- Consider moving patient to room near nurses station  INTERVENTIONS:- Educate patient/family on patient safety including physical limitations- Instruct patient to call for assistance with activity - Consult OT/PT to assist with strengthening/mobility - Keep Call bell within reach- Keep bed low and locked with side rails adjusted as appropriate- Keep care items and personal belongings within reach- Initiate and maintain comfort rounds- Make Fall Risk Sign visible to staff- Offer Toileting every 2 Hours, in advance of need- Initiate/Maintain bed alarm- Obtain necessary fall risk management equipment: socks- Apply yellow socks and bracelet for high fall risk patients- Consider moving patient to room near nurses station  4/20/2025 0723 by Pretty Pearson RN  Outcome: Progressing  4/19/2025 2046 by Pretty Pearson RN  Outcome: Progressing  Goal: Maintain or return to baseline ADL function  Description: INTERVENTIONS:-  Assess patient's ability to carry out ADLs; assess patient's baseline for ADL function and identify physical deficits which impact ability to perform ADLs (bathing, care of mouth/teeth, toileting, grooming, dressing, etc.)- Assess/evaluate cause of self-care deficits - Assess range of motion- Assess patient's mobility; develop plan if impaired- Assess patient's need for assistive devices and provide as appropriate- Encourage maximum independence but intervene and supervise when necessary- Involve family in performance of ADLs- Assess for home care needs following discharge - Consider OT consult to assist with ADL evaluation and planning for discharge- Provide patient education as appropriate  4/20/2025 0723 by Pretty Pearson RN  Outcome: Progressing  4/19/2025 2046 by Pretty Pearson RN  Outcome: Progressing

## 2025-04-21 ENCOUNTER — APPOINTMENT (INPATIENT)
Dept: NON INVASIVE DIAGNOSTICS | Facility: HOSPITAL | Age: OVER 89
End: 2025-04-21
Payer: MEDICARE

## 2025-04-21 LAB
ANION GAP SERPL CALCULATED.3IONS-SCNC: 7 MMOL/L (ref 4–13)
AORTIC ROOT: 4.3 CM
AORTIC VALVE MEAN VELOCITY: 8.1 M/S
ASCENDING AORTA: 3.5 CM
AV AREA BY CONTINUOUS VTI: 1.9 CM2
AV AREA PEAK VELOCITY: 2.2 CM2
AV LVOT MEAN GRADIENT: 1 MMHG
AV LVOT PEAK GRADIENT: 2 MMHG
AV MEAN PRESS GRAD SYS DOP V1V2: 3 MMHG
AV ORIFICE AREA US: 1.9 CM2
AV PEAK GRADIENT: 5 MMHG
AV REGURGITATION PRESSURE HALF TIME: 438 MS
AV VELOCITY RATIO: 0.5
BASOPHILS # BLD AUTO: 0.03 THOUSANDS/ÂΜL (ref 0–0.1)
BASOPHILS NFR BLD AUTO: 1 % (ref 0–1)
BSA FOR ECHO PROCEDURE: 1.98 M2
BUN SERPL-MCNC: 33 MG/DL (ref 5–25)
CALCIUM SERPL-MCNC: 8.6 MG/DL (ref 8.4–10.2)
CHLORIDE SERPL-SCNC: 100 MMOL/L (ref 96–108)
CO2 SERPL-SCNC: 33 MMOL/L (ref 21–32)
CREAT SERPL-MCNC: 1.18 MG/DL (ref 0.6–1.3)
DOP CALC AO VTI: 26.8 CM
DOP CALC LVOT AREA: 3.8
DOP CALC LVOT CARDIAC OUTPUT: 3.2 L/MIN
DOP CALC LVOT DIAMETER: 2.2 CM
DOP CALC LVOT PEAK VEL VTI: 13.4 CM
DOP CALC LVOT PEAK VEL: 0.66 M/S
DOP CALC LVOT STROKE INDEX: 25.8 ML/M2
DOP CALC LVOT STROKE VOLUME: 50.91
E WAVE DECELERATION TIME: 256 MS
EOSINOPHIL # BLD AUTO: 0.18 THOUSAND/ÂΜL (ref 0–0.61)
EOSINOPHIL NFR BLD AUTO: 4 % (ref 0–6)
ERYTHROCYTE [DISTWIDTH] IN BLOOD BY AUTOMATED COUNT: 14.6 % (ref 11.6–15.1)
FRACTIONAL SHORTENING: 24 (ref 28–44)
GFR SERPL CREATININE-BSD FRML MDRD: 54 ML/MIN/1.73SQ M
GLUCOSE SERPL-MCNC: 73 MG/DL (ref 65–140)
HCT VFR BLD AUTO: 41.1 % (ref 36.5–49.3)
HGB BLD-MCNC: 12.9 G/DL (ref 12–17)
IMM GRANULOCYTES # BLD AUTO: 0.02 THOUSAND/UL (ref 0–0.2)
IMM GRANULOCYTES NFR BLD AUTO: 0 % (ref 0–2)
INR PPP: 2.25 (ref 0.85–1.19)
INTERVENTRICULAR SEPTUM IN DIASTOLE (PARASTERNAL SHORT AXIS VIEW): 1.4 CM
INTERVENTRICULAR SEPTUM: 1.4 CM (ref 0.6–1.1)
LA/AORTA RATIO 2D: 1.14
LAAS-AP2: 39.7 CM2
LAAS-AP4: 39.7 CM2
LEFT ATRIUM SIZE: 4.9 CM
LEFT ATRIUM VOLUME (MOD BIPLANE): 171 ML
LEFT ATRIUM VOLUME INDEX (MOD BIPLANE): 86.4 ML/M2
LEFT INTERNAL DIMENSION IN SYSTOLE: 4.2 CM (ref 2.1–4)
LEFT VENTRICLE DIASTOLIC VOLUME (MOD BIPLANE): 227 ML
LEFT VENTRICLE DIASTOLIC VOLUME INDEX (MOD BIPLANE): 114.6 ML/M2
LEFT VENTRICLE SYSTOLIC VOLUME (MOD BIPLANE): 150 ML
LEFT VENTRICLE SYSTOLIC VOLUME INDEX (MOD BIPLANE): 75.8 ML/M2
LEFT VENTRICULAR INTERNAL DIMENSION IN DIASTOLE: 5.5 CM (ref 3.5–6)
LEFT VENTRICULAR POSTERIOR WALL IN END DIASTOLE: 1.2 CM
LEFT VENTRICULAR STROKE VOLUME: 69 ML
LV EF BIPLANE MOD: 34 %
LV EF US.2D.A4C+ESTIMATED: 36 %
LVSV (TEICH): 69 ML
LYMPHOCYTES # BLD AUTO: 0.88 THOUSANDS/ÂΜL (ref 0.6–4.47)
LYMPHOCYTES NFR BLD AUTO: 18 % (ref 14–44)
MAGNESIUM SERPL-MCNC: 2 MG/DL (ref 1.9–2.7)
MCH RBC QN AUTO: 28.8 PG (ref 26.8–34.3)
MCHC RBC AUTO-ENTMCNC: 31.4 G/DL (ref 31.4–37.4)
MCV RBC AUTO: 92 FL (ref 82–98)
MONOCYTES # BLD AUTO: 0.66 THOUSAND/ÂΜL (ref 0.17–1.22)
MONOCYTES NFR BLD AUTO: 13 % (ref 4–12)
MV E'TISSUE VEL-LAT: 7 CM/S
MV E'TISSUE VEL-SEP: 6 CM/S
MV PEAK E VEL: 91 CM/S
NEUTROPHILS # BLD AUTO: 3.15 THOUSANDS/ÂΜL (ref 1.85–7.62)
NEUTS SEG NFR BLD AUTO: 64 % (ref 43–75)
NRBC BLD AUTO-RTO: 0 /100 WBCS
PLATELET # BLD AUTO: 110 THOUSANDS/UL (ref 149–390)
PLATELET BLD QL SMEAR: ABNORMAL
PMV BLD AUTO: 11.8 FL (ref 8.9–12.7)
POTASSIUM SERPL-SCNC: 3.2 MMOL/L (ref 3.5–5.3)
PROTHROMBIN TIME: 25.2 SECONDS (ref 12.3–15)
RA PRESSURE ESTIMATED: 3 MMHG
RBC # BLD AUTO: 4.48 MILLION/UL (ref 3.88–5.62)
RBC MORPH BLD: NORMAL
RIGHT VENTRICLE ID DIMENSION: 4.4 CM
RV PSP: 23 MMHG
SL CV AV PEAK GRADIENT RETROGRADE: 22 MMHG
SL CV LV EF: 25
SL CV PED ECHO LEFT VENTRICLE DIASTOLIC VOLUME (MOD BIPLANE) 2D: 149 ML
SL CV PED ECHO LEFT VENTRICLE SYSTOLIC VOLUME (MOD BIPLANE) 2D: 80 ML
SODIUM SERPL-SCNC: 140 MMOL/L (ref 135–147)
TR MAX PG: 20 MMHG
TR PEAK VELOCITY: 2.2 M/S
TRICUSPID ANNULAR PLANE SYSTOLIC EXCURSION: 1.2 CM
TRICUSPID VALVE PEAK REGURGITATION VELOCITY: 2.21 M/S
WBC # BLD AUTO: 4.92 THOUSAND/UL (ref 4.31–10.16)

## 2025-04-21 PROCEDURE — 99223 1ST HOSP IP/OBS HIGH 75: CPT | Performed by: INTERNAL MEDICINE

## 2025-04-21 PROCEDURE — 93306 TTE W/DOPPLER COMPLETE: CPT | Performed by: INTERNAL MEDICINE

## 2025-04-21 PROCEDURE — 83735 ASSAY OF MAGNESIUM: CPT | Performed by: INTERNAL MEDICINE

## 2025-04-21 PROCEDURE — 93306 TTE W/DOPPLER COMPLETE: CPT

## 2025-04-21 PROCEDURE — 99232 SBSQ HOSP IP/OBS MODERATE 35: CPT | Performed by: FAMILY MEDICINE

## 2025-04-21 PROCEDURE — 85025 COMPLETE CBC W/AUTO DIFF WBC: CPT | Performed by: INTERNAL MEDICINE

## 2025-04-21 PROCEDURE — 85610 PROTHROMBIN TIME: CPT | Performed by: INTERNAL MEDICINE

## 2025-04-21 PROCEDURE — 80048 BASIC METABOLIC PNL TOTAL CA: CPT | Performed by: INTERNAL MEDICINE

## 2025-04-21 RX ORDER — FUROSEMIDE 10 MG/ML
60 INJECTION INTRAMUSCULAR; INTRAVENOUS
Status: DISCONTINUED | OUTPATIENT
Start: 2025-04-21 | End: 2025-04-21

## 2025-04-21 RX ORDER — FUROSEMIDE 40 MG/1
40 TABLET ORAL DAILY
Status: DISCONTINUED | OUTPATIENT
Start: 2025-04-21 | End: 2025-04-22

## 2025-04-21 RX ORDER — POTASSIUM CHLORIDE 1500 MG/1
20 TABLET, EXTENDED RELEASE ORAL ONCE
Status: COMPLETED | OUTPATIENT
Start: 2025-04-21 | End: 2025-04-21

## 2025-04-21 RX ADMIN — WARFARIN SODIUM 5 MG: 5 TABLET ORAL at 18:43

## 2025-04-21 RX ADMIN — METOPROLOL TARTRATE 25 MG: 25 TABLET, FILM COATED ORAL at 20:45

## 2025-04-21 RX ADMIN — EMPAGLIFLOZIN 10 MG: 10 TABLET, FILM COATED ORAL at 15:11

## 2025-04-21 RX ADMIN — FUROSEMIDE 40 MG: 40 TABLET ORAL at 18:43

## 2025-04-21 RX ADMIN — POTASSIUM CHLORIDE 20 MEQ: 1500 TABLET, EXTENDED RELEASE ORAL at 09:03

## 2025-04-21 RX ADMIN — METOPROLOL TARTRATE 25 MG: 25 TABLET, FILM COATED ORAL at 09:03

## 2025-04-21 RX ADMIN — POTASSIUM CHLORIDE 20 MEQ: 1500 TABLET, EXTENDED RELEASE ORAL at 09:10

## 2025-04-21 RX ADMIN — ATORVASTATIN CALCIUM 10 MG: 10 TABLET, FILM COATED ORAL at 18:43

## 2025-04-21 RX ADMIN — TETRAHYDROZOLINE HCL 1 DROP: 0.05 SOLUTION/ DROPS OPHTHALMIC at 09:04

## 2025-04-21 RX ADMIN — FERROUS SULFATE TAB 325 MG (65 MG ELEMENTAL FE) 325 MG: 325 (65 FE) TAB at 09:09

## 2025-04-21 NOTE — PLAN OF CARE
Problem: Potential for Falls  Goal: Patient will remain free of falls  Description: INTERVENTIONS:- Educate patient/family on patient safety including physical limitations- Instruct patient to call for assistance with activity - Consult OT/PT to assist with strengthening/mobility - Keep Call bell within reach- Keep bed low and locked with side rails adjusted as appropriate- Keep care items and personal belongings within reach- Initiate and maintain comfort rounds- Make Fall Risk Sign visible to staff- Offer Toileting every 2 Hours, in advance of need- Initiate/Maintain 2 alarms- Obtain necessary fall risk management equipment: yellow bracelet and yellow socks- Apply yellow socks and bracelet for high fall risk patients- Consider moving patient to room near nurses station  INTERVENTIONS:- Educate patient/family on patient safety including physical limitations- Instruct patient to call for assistance with activity - Consult OT/PT to assist with strengthening/mobility - Keep Call bell within reach- Keep bed low and locked with side rails adjusted as appropriate- Keep care items and personal belongings within reach- Initiate and maintain comfort rounds- Make Fall Risk Sign visible to staff- Offer Toileting every 2 Hours, in advance of need- Initiate/Maintain 2 alarms- Obtain necessary fall risk management equipment: yellow bracelet and yellow socks- Apply yellow socks and bracelet for high fall risk patients- Consider moving patient to room near nurses station  Outcome: Progressing     Problem: PAIN - ADULT  Goal: Verbalizes/displays adequate comfort level or baseline comfort level  Description: Interventions:- Encourage patient to monitor pain and request assistance- Assess pain using appropriate pain scale- Administer analgesics based on type and severity of pain and evaluate response- Implement non-pharmacological measures as appropriate and evaluate response- Consider cultural and social influences on pain and  pain management- Notify physician/advanced practitioner if interventions unsuccessful or patient reports new pain  Outcome: Progressing     Problem: INFECTION - ADULT  Goal: Absence or prevention of progression during hospitalization  Description: INTERVENTIONS:- Assess and monitor for signs and symptoms of infection- Monitor lab/diagnostic results- Monitor all insertion sites, i.e. indwelling lines, tubes, and drains- Monitor endotracheal if appropriate and nasal secretions for changes in amount and color- [place] appropriate cooling/warming therapies per order- Administer medications as ordered- Instruct and encourage patient and family to use good hand hygiene technique- Identify and instruct in appropriate isolation precautions for identified infection/condition  Outcome: Progressing  Goal: Absence of fever/infection during neutropenic period  Description: INTERVENTIONS:- Monitor WBC  Outcome: Progressing     Problem: SAFETY ADULT  Goal: Patient will remain free of falls  Description: INTERVENTIONS:- Educate patient/family on patient safety including physical limitations- Instruct patient to call for assistance with activity - Consult OT/PT to assist with strengthening/mobility - Keep Call bell within reach- Keep bed low and locked with side rails adjusted as appropriate- Keep care items and personal belongings within reach- Initiate and maintain comfort rounds- Make Fall Risk Sign visible to staff- Offer Toileting every 2 Hours, in advance of need- Initiate/Maintain 2 alarms- Obtain necessary fall risk management equipment: yellow bracelet and yellow socks- Apply yellow socks and bracelet for high fall risk patients- Consider moving patient to room near nurses station  INTERVENTIONS:- Educate patient/family on patient safety including physical limitations- Instruct patient to call for assistance with activity - Consult OT/PT to assist with strengthening/mobility - Keep Call bell within reach- Keep bed low and  locked with side rails adjusted as appropriate- Keep care items and personal belongings within reach- Initiate and maintain comfort rounds- Make Fall Risk Sign visible to staff- Offer Toileting every 2 Hours, in advance of need- Initiate/Maintain 2 alarms- Obtain necessary fall risk management equipment: yellow bracelet and yellow socks- Apply yellow socks and bracelet for high fall risk patients- Consider moving patient to room near nurses station  Outcome: Progressing  Goal: Maintain or return to baseline ADL function  Description: INTERVENTIONS:-  Assess patient's ability to carry out ADLs; assess patient's baseline for ADL function and identify physical deficits which impact ability to perform ADLs (bathing, care of mouth/teeth, toileting, grooming, dressing, etc.)- Assess/evaluate cause of self-care deficits - Assess range of motion- Assess patient's mobility; develop plan if impaired- Assess patient's need for assistive devices and provide as appropriate- Encourage maximum independence but intervene and supervise when necessary- Involve family in performance of ADLs- Assess for home care needs following discharge - Consider OT consult to assist with ADL evaluation and planning for discharge- Provide patient education as appropriate  Outcome: Progressing  Goal: Maintains/Returns to pre admission functional level  Description: INTERVENTIONS:- Perform AM-PAC 6 Click Basic Mobility/ Daily Activity assessment daily.- Set and communicate daily mobility goal to care team and patient/family/caregiver. - Collaborate with rehabilitation services on mobility goals if consulted- Perform Range of Motion 2 times a day.- Reposition patient every 2 hours.- Dangle patient 2 times a day- Stand patient 2 times a day- Ambulate patient 2 times a day- Out of bed to chair 2 times a day - Out of bed for meals 2 times a day- Out of bed for toileting- Record patient progress and toleration of activity level   Outcome: Progressing      Problem: DISCHARGE PLANNING  Goal: Discharge to home or other facility with appropriate resources  Description: INTERVENTIONS:- Identify barriers to discharge w/patient and caregiver- Arrange for needed discharge resources and transportation as appropriate- Identify discharge learning needs (meds, wound care, etc.)- Arrange for interpretive services to assist at discharge as needed- Refer to Case Management Department for coordinating discharge planning if the patient needs post-hospital services based on physician/advanced practitioner order or complex needs related to functional status, cognitive ability, or social support system  Outcome: Progressing     Problem: Knowledge Deficit  Goal: Patient/family/caregiver demonstrates understanding of disease process, treatment plan, medications, and discharge instructions  Description: Complete learning assessment and assess knowledge base.Interventions:- Provide teaching at level of understanding- Provide teaching via preferred learning methods  Outcome: Progressing     Problem: Decreased Cardiac Output  Goal: Cardiac output adequate for individual needs  Description: INTERVENTIONS: Monitor for signs and symptoms of decreased cardiac output - Monitor for dyspnea with exertion and at rest- Monitor for orthopnea- Monitor for signs of tachycardia. Place patient on telemetry monitoring.- Assess patient for jugular vein distention- Assess patient for lower extremity edema and poor peripheral perfusion - Auscultate lung sound for Fine bibasilar crackles - Monitor for cardiac arrythmias - Administer beta blockers, antiarrhythmic, and blood pressure medications as ordered  Outcome: Progressing     Problem: Impaired Gas Exchange  Goal: Optimize oxygenation and ensure adequate ventilation  Description: INTERVENTIONS: Monitor for signs and symptoms of respiratory distress              - Elevate HOB or use high fowlers to promote lung expansion              - Administer oxygen as  ordered to maintain adequate oxygenation              - Encourage use of IS to promote lung expansion and prevent PN              - Monitor ABGs to assess oxygenation status              - Monitor blood oxygen level to maintain adequate oxygenation              - Encourage cough and deep breathing exercises to promote lung expansion              - Monitor patient's mental status for increased confusion  Outcome: Progressing     Problem: Excess Fluid Volume  Goal: Patient is able to achieve and maintain homeostasis  Description: INTERVENTIONS: Monitor for sign and symptoms of fluid overload- Evaluate LE edema every shift- Elevate LE to prevent dependent edema- Apply WENDY stockings as ordered - Monitor ankle circumference daily- Assess for jugular vein distention- Evaluate provider orders for the CHF diuretic algorithm. Administer diuretics as ordered- Weigh the patient daily at 0600 and report a weight gain of five pounds or more - Strict intake and output- Monitor fluid intake and adhere to fluid restrictions- Assess lung sounds every shift and as needed- Monitor vital signs and lab values (CBC, chem, BUN, BNP)- Measure and document urine output  Outcome: Progressing     Problem: Activity Intolerance  Goal: Patient is able to perform activities within their limitations  Description: INTERVENTIONS:                     -   Alternate periods of activity with periods of rest               -   Patients is able to maintain normal vitals heart rhythm during activity               -   Gradually increase activity and exercise as patient can tolerate               -   Monitor blood pressure and heart before and after exercise                -   Monitor blood oxygen saturation during activity and apply oxygen as needed  Outcome: Progressing     Problem: Knowledge Deficit  Goal: Patient is able to verbalize understanding of Heart Failure after education  Description: INTERVENTIONS:- Educate the patient and family on signs and  symptoms of HF- Provide the patient with HF education and HF zone tool- Educate on the importance of daily weight in the AM and reporting a weight gain             of 3 or more pounds to their primary care physician- Monitor for SOB- Maintain and sodium and fluid restriction- Educate the patient on the importance of medications such as: diuretics, betablockers,             antiarrhythmics and their purpose, dose, route, side effects and labs             if they are needed  Outcome: Progressing     Problem: Nutrition/Hydration-ADULT  Goal: Nutrient/Hydration intake appropriate for improving, restoring or maintaining nutritional needs  Description: Monitor and assess patient's nutrition/hydration status for malnutrition. Collaborate with interdisciplinary team and initiate plan and interventions as ordered.  Monitor patient's weight and dietary intake as ordered or per policy. Utilize nutrition screening tool and intervene as necessary. Determine patient's food preferences and provide high-protein, high-caloric foods as appropriate. INTERVENTIONS:- Monitor oral intake, urinary output, labs, and treatment plans- Assess nutrition and hydration status and recommend course of action- Evaluate amount of meals eaten- Assist patient with eating if necessary - Allow adequate time for meals- Recommend/ encourage appropriate diets, oral nutritional supplements, and vitamin/mineral supplements- Order, calculate, and assess calorie counts as needed- Recommend, monitor, and adjust tube feedings and TPN/PPN based on assessed needs- Assess need for intravenous fluids- Provide specific nutrition/hydration education as appropriate- Include patient/family/caregiver in decisions related to nutrition  Outcome: Progressing

## 2025-04-21 NOTE — NURSING NOTE
This nurse spoke with patient in regards to CHF packet and weighing himself. Patient reported to this nurse that he has a scale at home and does weigh himself, but he was not aware that he was to notify a provider with an increase in weight. Packet of information was given to patient and explained that we would discuss further over course of hospitalization. Call bell in reach.

## 2025-04-21 NOTE — PLAN OF CARE
Problem: PAIN - ADULT  Goal: Verbalizes/displays adequate comfort level or baseline comfort level  Description: Interventions:- Encourage patient to monitor pain and request assistance- Assess pain using appropriate pain scale- Administer analgesics based on type and severity of pain and evaluate response- Implement non-pharmacological measures as appropriate and evaluate response- Consider cultural and social influences on pain and pain management- Notify physician/advanced practitioner if interventions unsuccessful or patient reports new pain  Outcome: Progressing     Problem: INFECTION - ADULT  Goal: Absence or prevention of progression during hospitalization  Description: INTERVENTIONS:- Assess and monitor for signs and symptoms of infection- Monitor lab/diagnostic results- Monitor all insertion sites, i.e. indwelling lines, tubes, and drains- Monitor endotracheal if appropriate and nasal secretions for changes in amount and color- [place] appropriate cooling/warming therapies per order- Administer medications as ordered- Instruct and encourage patient and family to use good hand hygiene technique- Identify and instruct in appropriate isolation precautions for identified infection/condition  Outcome: Progressing  Goal: Absence of fever/infection during neutropenic period  Description: INTERVENTIONS:- Monitor WBC  Outcome: Progressing     Problem: Decreased Cardiac Output  Goal: Cardiac output adequate for individual needs  Description: INTERVENTIONS: Monitor for signs and symptoms of decreased cardiac output - Monitor for dyspnea with exertion and at rest- Monitor for orthopnea- Monitor for signs of tachycardia. Place patient on telemetry monitoring.- Assess patient for jugular vein distention- Assess patient for lower extremity edema and poor peripheral perfusion - Auscultate lung sound for Fine bibasilar crackles - Monitor for cardiac arrythmias - Administer beta blockers, antiarrhythmic, and blood  pressure medications as ordered  Outcome: Progressing     Problem: Impaired Gas Exchange  Goal: Optimize oxygenation and ensure adequate ventilation  Description: INTERVENTIONS: Monitor for signs and symptoms of respiratory distress              - Elevate HOB or use high fowlers to promote lung expansion              - Administer oxygen as ordered to maintain adequate oxygenation              - Encourage use of IS to promote lung expansion and prevent PN              - Monitor ABGs to assess oxygenation status              - Monitor blood oxygen level to maintain adequate oxygenation              - Encourage cough and deep breathing exercises to promote lung expansion              - Monitor patient's mental status for increased confusion  Outcome: Progressing

## 2025-04-21 NOTE — PROGRESS NOTES
"Progress Note - Hospitalist   Name: Larry Jenkins 89 y.o. male I MRN: [id number]  Unit/Bed#: -01 I Date of Admission: 4/19/2025   Date of Service: 4/21/2025 I Hospital Day: 1    Assessment & Plan  Acute exacerbation of CHF (congestive heart failure) (HCC)  Wt Readings from Last 3 Encounters:   04/21/25 78.1 kg (172 lb 2.9 oz)   04/15/25 78.5 kg (173 lb)   03/07/25 79.4 kg (175 lb)   Patient presented with shortness of breath for several weeks, admitted in February due to similar symptoms  Troponins 202> 215, BNP 3092  EKG with paced rhythm  Echo in 2018 showed left ventricular ejection fraction of 40 to 45%, repeat ECHO  Patient is on home 20 mg Lasix daily  Plan  Continue metoprolol  Hold lisinopril due to elevated creatinine  Continue telemetry monitor  Strict I's and O's  Fluid and salt restrictions   Echocardiogram from 4/21:\"The left ventricular ejection fraction is 25-30%. Systolic function is severely reduced. There is severe global hypokinesis with regional variation \"  Cardiology involved, transition to oral Lasix, initiated Jardiance  Elevated troponin  Patient presented with elevated troponin  Probably increased demand in setting of acute CHF  EKG for any chest pain  Patient stopped taking Imdur and Ranexa as it gives him constipation- continue to hold Imdur and Ranexa  History of mitral valve replacement  Patient with history of mitral valve replacement 20 years ago  Patient is on warfarin 5 mg daily except Sunday 2.5  Follow-up on INR and restart warfarin   Hypertension  on lisinopril and metoprolol  Continue metoprolol  Hold lisinopril in setting of elevated creatinine level  CKD (chronic kidney disease)  Lab Results   Component Value Date    EGFR 54 04/21/2025    EGFR 50 04/20/2025    EGFR 47 04/19/2025    CREATININE 1.18 04/21/2025    CREATININE 1.25 04/20/2025    CREATININE 1.32 (H) 04/19/2025   Patient with past medical history of CKD  Patient creatinine on admission 1.32  Probably in " setting of CHF with cardiorenal etiology  Avoid nephrotoxic agents  Continue IV diuresis  Continue to monitor creatinine, I/O  Pacemaker  Patient has history of A-fib with pacemaker placed 20 years ago  Coronary artery disease  Patient with past med history of coronary disease status post CABG 20 years ago  Patient is on metoprolol and statin  Continue current regimen    VTE Pharmacologic Prophylaxis:    on warfarin    Mobility:   Basic Mobility Inpatient Raw Score: 21  JH-HLM Goal: 6: Walk 10 steps or more  JH-HLM Achieved: 7: Walk 25 feet or more  JH-HLM Goal achieved. Continue to encourage appropriate mobility.    Patient Centered Rounds: I performed bedside rounds with nursing staff today.   Discussions with Specialists or Other Care Team Provider: case management; Dr. Strauss reviewed    Education and Discussions with Family / Patient:     Current Length of Stay: 1 day(s)  Current Patient Status: Inpatient   Certification Statement: The patient will continue to require additional inpatient hospital stay due to CHF exacerbation management  Discharge Plan: Anticipate discharge in 24-48 hrs to discharge location to be determined pending rehab evaluations.    Code Status: Level 3 - DNAR and DNI    Subjective   Patient seen and examined, reports feeling well, denies any complaints    Objective :  Temp:  [97.2 °F (36.2 °C)-97.8 °F (36.6 °C)] 97.2 °F (36.2 °C)  HR:  [74-93] 82  BP: (108-128)/(51-76) 108/64  Resp:  [14-18] 18  SpO2:  [92 %-98 %] 98 %  O2 Device: None (Room air)  Nasal Cannula O2 Flow Rate (L/min):  [2 L/min] 2 L/min    Body mass index is 24.01 kg/m².     Input and Output Summary (last 24 hours):     Intake/Output Summary (Last 24 hours) at 4/21/2025 1737  Last data filed at 4/21/2025 1617  Gross per 24 hour   Intake 130 ml   Output 2150 ml   Net -2020 ml       Physical Exam  Constitutional:       Appearance: Normal appearance.   HENT:      Head: Normocephalic and atraumatic.   Cardiovascular:      Rate and  Rhythm: Normal rate and regular rhythm.      Pulses: Normal pulses.      Heart sounds: Normal heart sounds.   Pulmonary:      Breath sounds: Rales present.   Abdominal:      General: Bowel sounds are normal.      Palpations: Abdomen is soft.   Musculoskeletal:      Cervical back: Normal range of motion.      Right lower leg: No edema.      Left lower leg: No edema.   Skin:     General: Skin is warm.      Capillary Refill: Capillary refill takes less than 2 seconds.   Neurological:      General: No focal deficit present.      Mental Status: He is alert and oriented to person, place, and time. Mental status is at baseline.           Lines/Drains:        Telemetry:  Telemetry Orders (From admission, onward)               24 Hour Telemetry Monitoring  Continuous x 24 Hours (Telem)        Expiring   Question:  Reason for 24 Hour Telemetry  Answer:  Decompensated CHF- and any one of the following: continuous diuretic infusion or total diuretic dose >200 mg daily, associated electrolyte derangement (I.e. K < 3.0), inotropic drip (continuous infusion), hx of ventricular arrhythmia, or new EF < 35%                     Telemetry Reviewed:  reviewd  Indication for Continued Telemetry Use: Arrthymias requiring medical therapy               Lab Results: I have reviewed the following results:   Results from last 7 days   Lab Units 04/21/25  0607   WBC Thousand/uL 4.92   HEMOGLOBIN g/dL 12.9   HEMATOCRIT % 41.1   PLATELETS Thousands/uL 110*   SEGS PCT % 64   LYMPHO PCT % 18   MONO PCT % 13*   EOS PCT % 4     Results from last 7 days   Lab Units 04/21/25  0607 04/20/25  0446   SODIUM mmol/L 140 143   POTASSIUM mmol/L 3.2* 3.9   CHLORIDE mmol/L 100 106   CO2 mmol/L 33* 29   BUN mg/dL 33* 30*   CREATININE mg/dL 1.18 1.25   ANION GAP mmol/L 7 8   CALCIUM mg/dL 8.6 8.7   ALBUMIN g/dL  --  3.7   TOTAL BILIRUBIN mg/dL  --  1.13*   ALK PHOS U/L  --  46   ALT U/L  --  14   AST U/L  --  23   GLUCOSE RANDOM mg/dL 73 87     Results from last  7 days   Lab Units 04/21/25  0607   INR  2.25*                   Recent Cultures (last 7 days):               Last 24 Hours Medication List:     Current Facility-Administered Medications:     albuterol (PROVENTIL HFA,VENTOLIN HFA) inhaler 2 puff, Q6H PRN    atorvastatin (LIPITOR) tablet 10 mg, Daily With Dinner    Empagliflozin (JARDIANCE) tablet 10 mg, Daily    ferrous sulfate tablet 325 mg, Daily With Breakfast    furosemide (LASIX) tablet 40 mg, Daily    [Held by provider] lisinopril (ZESTRIL) tablet 5 mg, Daily    metoprolol tartrate (LOPRESSOR) tablet 25 mg, Q12H ETELVINA    potassium chloride (Klor-Con M20) CR tablet 20 mEq, Daily    tetrahydrozoline (VISINE) 0.05 % ophthalmic solution 1 drop, QAM    warfarin (COUMADIN) tablet 2.5 mg, Every Sunday    warfarin (COUMADIN) tablet 5 mg, Once per day on Monday Tuesday Wednesday Thursday Friday Saturday    Administrative Statements   Today, Patient Was Seen By: Esme Soto MD      **Please Note: This note may have been constructed using a voice recognition system.**

## 2025-04-21 NOTE — ASSESSMENT & PLAN NOTE
Lab Results   Component Value Date    EGFR 54 04/21/2025    EGFR 50 04/20/2025    EGFR 47 04/19/2025    CREATININE 1.18 04/21/2025    CREATININE 1.25 04/20/2025    CREATININE 1.32 (H) 04/19/2025   Patient with past medical history of CKD  Patient creatinine on admission 1.32  Probably in setting of CHF with cardiorenal etiology  Avoid nephrotoxic agents  Continue IV diuresis  Continue to monitor creatinine, I/O

## 2025-04-21 NOTE — ASSESSMENT & PLAN NOTE
"Wt Readings from Last 3 Encounters:   04/21/25 78.1 kg (172 lb 2.9 oz)   04/15/25 78.5 kg (173 lb)   03/07/25 79.4 kg (175 lb)   Patient presented with shortness of breath for several weeks, admitted in February due to similar symptoms  Troponins 202> 215, BNP 3092  EKG with paced rhythm  Echo in 2018 showed left ventricular ejection fraction of 40 to 45%, repeat ECHO  Patient is on home 20 mg Lasix daily  Plan  Continue metoprolol  Hold lisinopril due to elevated creatinine  Continue telemetry monitor  Strict I's and O's  Fluid and salt restrictions   Echocardiogram from 4/21:\"The left ventricular ejection fraction is 25-30%. Systolic function is severely reduced. There is severe global hypokinesis with regional variation \"  Cardiology involved, transition to oral Lasix, initiated Jardiance  "

## 2025-04-21 NOTE — ASSESSMENT & PLAN NOTE
History of CABG in 2001  Metoprolol , Lipitor  -patient reports constipation with both isosorbide and Ranexa discontinued

## 2025-04-21 NOTE — PHYSICAL THERAPY NOTE
PHYSICAL THERAPY CANCEL NOTE      Patient Name: Larry Jenkins  Today's Date: 4/21/2025 04/21/25 1115   PT Last Visit   PT Visit Date 04/21/25   Note Type   Note type Cancelled Session   Cancel Reasons Other   Additional Comments PT orders received and chart review completed. Attempted to see pt this am, however pt currently having an echo. Will re-attempt later this date as time and census allows.     González Cortes, PT

## 2025-04-21 NOTE — ASSESSMENT & PLAN NOTE
Wt Readings from Last 3 Encounters:   04/19/25 78.1 kg (172 lb 2.9 oz)   04/15/25 78.5 kg (173 lb)   03/07/25 79.4 kg (175 lb)   BNP: 3092, Troponin 202<215<207  CXR: Mild pulmonary edema, no pleural effusion  Echo: EF 25 to 30%, severe global hypokinesis with regional variation, RV dilatation with moderately to systolic function, LA dilatation, RA dilatation, mild AR, mechanical mitral valve in place, mild aortic root dilatation.  Home diuretic: Furosemide 20 mg PO. daily  Diuretic:  Furosemide 40 mg IV, twice daily -will increase to 60 mg today

## 2025-04-21 NOTE — CONSULTS
Consultation - Cardiology   Name: Larry Jenkins 89 y.o. male I MRN: [id number]  Unit/Bed#: -01 I Date of Admission: 4/19/2025   Date of Service: 4/21/2025 I Hospital Day: 1   Inpatient consult to Cardiology  Consult performed by: SHAWNA Ann  Consult ordered by: Susan Membreno MD        Physician Requesting Evaluation: Esme Soto MD   Reason for Evaluation / Principal Problem: Heart failure     Assessment & Plan  Acute exacerbation of CHF (congestive heart failure) (HCC)  Wt Readings from Last 3 Encounters:   04/19/25 78.1 kg (172 lb 2.9 oz)   04/15/25 78.5 kg (173 lb)   03/07/25 79.4 kg (175 lb)   BNP: 3092, Troponin 202<215<207  CXR: Mild pulmonary edema, no pleural effusion  Echo: EF 25 to 30%, severe global hypokinesis with regional variation, RV dilatation with moderately to systolic function, LA dilatation, RA dilatation, mild AR, mechanical mitral valve in place, mild aortic root dilatation.  Home diuretic: Furosemide 20 mg PO. daily  Diuretic:  Furosemide 40 mg IV, twice daily -will increase to 60 mg today  Coronary artery disease  History of CABG in 2001  Metoprolol , Lipitor  -patient reports constipation with both isosorbide and Ranexa discontinued  Hypertension  Lisinopril held secondary to CHRISTINE   CKD (chronic kidney disease)  Creatinine 1.3  on arrival -trending down with diuresis  Elevated troponin  Non-MI related elevation in troponin secondary to volume overload.  History of mitral valve replacement  Mechanical mitral valve -patient on warfarin, INR 2.2 today  Pacemaker  [employer] pacemaker -V paced at this time  Recommendations / Plan  Will start Jardiance today  Will consider further medications once patient is euvolemic  Increase furosemide to 60 mg twice daily  Daily BMP  Daily weights, strict I's and O's  Patient follows with cardiology at [facility] with Dr. Snowden -will need close follow-up outpatient    History of Present Illness   Larry Jenkins is a 89 y.o. male  with a past medical history of atrial fibrillation,, stage IIIa chronic kidney disease, hyperlipidemia, hypertension mitral valve replacement, CAD with CABG in 2001 of who presents with increased shortness of breath.      Patient reports increase in shortness of breath since February, worse over the last intermittent chest pain.  At this visit he did not want to pursue any further workup and he was started on Ranexa and Imdur and discharged home.  Larry discontinued both after few weeks secondary to constipation.  He continued to experience shortness of breath, with symptoms of orthopnea, worse over the last week which prompted him to seek evaluation in the emergency department.    Chest x-ray in the emergency department elicited mild pulmonary edema, patient treated with furosemide IV with fair effect.  He denies chest pain, chest pressure, lightheadedness, dizziness at this time.    Review of Systems   Constitutional:  Positive for fatigue.   Respiratory:  Positive for cough, chest tightness and shortness of breath.    Cardiovascular:  Negative for chest pain.   Gastrointestinal:  Negative for constipation and diarrhea.   Genitourinary:  Negative for hematuria.   Neurological:  Negative for dizziness and light-headedness.     Medical History Review: I have reviewed the patient's PMH, PSH, Social History, Family History, Meds, and Allergies     Objective :  Temp:  [97.4 °F (36.3 °C)-97.9 °F (36.6 °C)] 97.4 °F (36.3 °C)  HR:  [60-93] 85  BP: (110-130)/(51-76) 115/57  Resp:  [14-18] 14  SpO2:  [80 %-97 %] 96 %  O2 Device: Nasal cannula  Nasal Cannula O2 Flow Rate (L/min):  [2 L/min] 2 L/min  Orthostatic Blood Pressures      Flowsheet Row Most Recent Value   Blood Pressure 115/57 filed at 04/21/2025 0739   Patient Position - Orthostatic VS Lying filed at 04/21/2025 0611          First Weight: Weight - Scale: 78.1 kg (172 lb 2.9 oz) (04/19/25 1544)  Vitals:    04/19/25 1544   Weight: 78.1 kg (172 lb 2.9 oz)        Physical Exam  Constitutional:       Appearance: Normal appearance.   HENT:      Head: Normocephalic and atraumatic.      Nose: Nose normal.      Mouth/Throat:      Mouth: Mucous membranes are moist.   Cardiovascular:      Rate and Rhythm: Normal rate and regular rhythm.      Pulses: Normal pulses.      Heart sounds: Normal heart sounds.   Pulmonary:      Breath sounds: Rales present.   Abdominal:      General: Bowel sounds are normal.      Palpations: Abdomen is soft.   Musculoskeletal:      Cervical back: Normal range of motion.      Right lower leg: No edema.      Left lower leg: No edema.   Skin:     General: Skin is warm.      Capillary Refill: Capillary refill takes less than 2 seconds.   Neurological:      General: No focal deficit present.      Mental Status: He is alert and oriented to person, place, and time. Mental status is at baseline.   Psychiatric:         Mood and Affect: Mood normal.         Lab Results: I have reviewed the following results:Troponin,BNP:No new results in last 24 hours.   Results from last 7 days   Lab Units 04/21/25  0607 04/20/25 0446 04/19/25  1255   WBC Thousand/uL 4.92 5.05 5.76   HEMOGLOBIN g/dL 12.9 12.2 13.0   HEMATOCRIT % 41.1 38.8 42.8   PLATELETS Thousands/uL 110* 129* 148*     Results from last 7 days   Lab Units 04/21/25  0607 04/20/25  0446 04/19/25  1255   POTASSIUM mmol/L 3.2* 3.9 4.5   CHLORIDE mmol/L 100 106 108   CO2 mmol/L 33* 29 30   BUN mg/dL 33* 30* 29*   CREATININE mg/dL 1.18 1.25 1.32*   CALCIUM mg/dL 8.6 8.7 9.2     Results from last 7 days   Lab Units 04/21/25  0607 04/20/25 0446 04/19/25  1351   INR  2.25* 3.48* 3.79*   PTT seconds  --   --  43*     Lab Results   Component Value Date    HGBA1C 5.7 10/15/2018     Lab Results   Component Value Date    CKTOTAL 564 (H) 12/28/2022    CKMB 2.6 12/28/2022    CKMBINDEX <1.0 12/28/2022    TROPONINI 0.07 (H) 11/13/2018       Imaging Results Review: I reviewed radiology reports from this admission including:  chest xray.  Other Study Results Review: EKG was reviewed.     VTE Prophylaxis: VTE covered by:  warfarin, Oral, 2.5 mg at 04/20/25 3977  warfarin, Oral

## 2025-04-21 NOTE — CASE MANAGEMENT
Case Management Assessment & Discharge Planning Note    Patient name Larry Jenkins  Location /-01 MRN [id number]  : 1935 Date 2025       Current Admission Date: 2025  Current Admission Diagnosis:Acute exacerbation of CHF (congestive heart failure) (HCC)   Patient Active Problem List    Diagnosis Date Noted Date Diagnosed    Acute exacerbation of CHF (congestive heart failure) (HCC) 2025     Pacemaker 2025     Hematuria 2025     Chronic systolic heart failure (HCC) 2025     SOB (shortness of breath) 2025     Elevated troponin 2025     History of mitral valve replacement 2025     CKD (chronic kidney disease) 2024     Advanced care planning/counseling discussion 2023     COVID-19 2022     Elevated CK 2022     Chronic a-fib (HCC) 2022     Depression, recurrent (HCC) 2022     Stage 3a chronic kidney disease (HCC) 2022     Primary osteoarthritis of both hips 2021     Trochanteric bursitis of both hips 2021     Paroxysmal atrial fibrillation (HCC) 2018     GI bleed 2018     Coagulopathy (HCC) 2018     Hyperglycemia 2016     Hypocalcemia 2016     Herpes zoster 2015     Insomnia 2013     Anxiety 2012     Arthritis 2012     Anemia of chronic disease 2012     Coronary artery disease 2012     Mixed hyperlipidemia 2012     Hypertension 2012       LOS (days): 1  Geometric Mean LOS (GMLOS) (days): 3.9  Days to GMLOS:2.9     OBJECTIVE:    Risk of Unplanned Readmission Score: 16.76         Current admission status: Inpatient       Preferred Pharmacy:   [facility] - [address] [address]  Phone: [phone] Fax: [phone]    [id number] - [place] - [address] [facility]  [address] [facility]  [place] [address]  Phone:  [phone] Fax: [phone]    Primary Care Provider: Andrey Bateman MD    Primary Insurance: MEDICARE  Secondary Insurance: [facility] BLUE SHIELD    ASSESSMENT:  Active Health Care Proxies       [facility] Jenkins Health Care Agent - [place]   Primary Phone: [phone] (Mobile)                 Advance Directives  Does patient have a Health Care POA?: Yes  Does patient have Advance Directives?: Yes  Advance Directives: Power of  for health care  Primary Contact: Janeth Graff.         Readmission Root Cause  30 Day Readmission: No    Patient Information  Admitted from:: Home  Mental Status: Alert  During Assessment patient was accompanied by: Not accompanied during assessment  Assessment information provided by:: Patient  Primary Caregiver: Self  Support Systems: Self, Family members, Children  County of Residence: [place]  What city do you live in?: [place].  Home entry access options. Select all that apply.: Stairs  Number of steps to enter home.: 2  Do the steps have railings?: Yes  Type of Current Residence: [facility]  Living Arrangements: Lives Alone  Is patient a ?: No    Activities of Daily Living Prior to Admission  Functional Status: Independent  Completes ADLs independently?: Yes  Ambulates independently?: Yes  Does patient use assisted devices?: Yes  Assisted Devices (DME) used: Straight Cane  Does patient currently own DME?: Yes  What DME does the patient currently own?: Walker, Straight Cane, Shower Chair  Does patient have a history of Outpatient Therapy (PT/OT)?: Yes  Does the patient have a history of Short-Term Rehab?: No  Does patient have a history of HHC?: Yes  Does patient currently have HHC?: No         Patient Information Continued  Income Source: SSI/SSD  Does patient have prescription coverage?: Yes  Can the patient afford their medications and any related supplies (such as glucometers or test strips)?: Yes  Does patient receive dialysis treatments?: No  Does patient have a  history of substance abuse?: No  Does patient have a history of Mental Health Diagnosis?: No         Means of Transportation  Means of Transport to Appts:: Family transport (Pt can drive, but family transports to appointments)          DISCHARGE DETAILS:    Discharge planning discussed with:: Pt  Freedom of Choice: Yes     CM contacted family/caregiver?: No- see comments (Pt is in contact with family.)  Were Treatment Team discharge recommendations reviewed with patient/caregiver?: Yes  Did patient/caregiver verbalize understanding of patient care needs?: Yes  Were patient/caregiver advised of the risks associated with not following Treatment Team discharge recommendations?: Yes         Requested Home Health Care         Is the patient interested in HHC at discharge?: No    DME Referral Provided  Referral made for DME?: No              Treatment Team Recommendation: Home  Discharge Destination Plan:: Home                                         Additional Comments: CM met with pt at bedside to discern discharge needs. Pt lives alone in 1sh, 2STE, 1st fl setup. Pt reports being independent with walking and ADLs PTA. Reports not using DME besides a cane to get to his mailbox, but owns a walker and shower chair. Reports hx of OPPT and HHC, but not current. No STR, inpatient psych, or D&A treatment. Pt reports medical POAs are [facility] and [facility]. Carlos will be ride home. [facility] in [place].

## 2025-04-22 LAB
ANION GAP SERPL CALCULATED.3IONS-SCNC: 8 MMOL/L (ref 4–13)
BASOPHILS # BLD AUTO: 0.04 THOUSANDS/ÂΜL (ref 0–0.1)
BASOPHILS NFR BLD AUTO: 1 % (ref 0–1)
BUN SERPL-MCNC: 36 MG/DL (ref 5–25)
CALCIUM SERPL-MCNC: 8.6 MG/DL (ref 8.4–10.2)
CHLORIDE SERPL-SCNC: 100 MMOL/L (ref 96–108)
CO2 SERPL-SCNC: 32 MMOL/L (ref 21–32)
CREAT SERPL-MCNC: 1.35 MG/DL (ref 0.6–1.3)
EOSINOPHIL # BLD AUTO: 0.19 THOUSAND/ÂΜL (ref 0–0.61)
EOSINOPHIL NFR BLD AUTO: 4 % (ref 0–6)
ERYTHROCYTE [DISTWIDTH] IN BLOOD BY AUTOMATED COUNT: 14.8 % (ref 11.6–15.1)
GFR SERPL CREATININE-BSD FRML MDRD: 46 ML/MIN/1.73SQ M
GLUCOSE SERPL-MCNC: 87 MG/DL (ref 65–140)
HCT VFR BLD AUTO: 40.4 % (ref 36.5–49.3)
HGB BLD-MCNC: 12.8 G/DL (ref 12–17)
IMM GRANULOCYTES # BLD AUTO: 0.02 THOUSAND/UL (ref 0–0.2)
IMM GRANULOCYTES NFR BLD AUTO: 0 % (ref 0–2)
INR PPP: 2.12 (ref 0.85–1.19)
LYMPHOCYTES # BLD AUTO: 1.21 THOUSANDS/ÂΜL (ref 0.6–4.47)
LYMPHOCYTES NFR BLD AUTO: 24 % (ref 14–44)
MCH RBC QN AUTO: 29.1 PG (ref 26.8–34.3)
MCHC RBC AUTO-ENTMCNC: 31.7 G/DL (ref 31.4–37.4)
MCV RBC AUTO: 92 FL (ref 82–98)
MONOCYTES # BLD AUTO: 0.69 THOUSAND/ÂΜL (ref 0.17–1.22)
MONOCYTES NFR BLD AUTO: 14 % (ref 4–12)
NEUTROPHILS # BLD AUTO: 2.92 THOUSANDS/ÂΜL (ref 1.85–7.62)
NEUTS SEG NFR BLD AUTO: 57 % (ref 43–75)
NRBC BLD AUTO-RTO: 0 /100 WBCS
PLATELET # BLD AUTO: 136 THOUSANDS/UL (ref 149–390)
PMV BLD AUTO: 12.4 FL (ref 8.9–12.7)
POTASSIUM SERPL-SCNC: 3.9 MMOL/L (ref 3.5–5.3)
PROTHROMBIN TIME: 24.1 SECONDS (ref 12.3–15)
RBC # BLD AUTO: 4.4 MILLION/UL (ref 3.88–5.62)
SODIUM SERPL-SCNC: 140 MMOL/L (ref 135–147)
WBC # BLD AUTO: 5.07 THOUSAND/UL (ref 4.31–10.16)

## 2025-04-22 PROCEDURE — 80048 BASIC METABOLIC PNL TOTAL CA: CPT | Performed by: FAMILY MEDICINE

## 2025-04-22 PROCEDURE — 85610 PROTHROMBIN TIME: CPT | Performed by: INTERNAL MEDICINE

## 2025-04-22 PROCEDURE — 99232 SBSQ HOSP IP/OBS MODERATE 35: CPT | Performed by: FAMILY MEDICINE

## 2025-04-22 PROCEDURE — 85025 COMPLETE CBC W/AUTO DIFF WBC: CPT | Performed by: FAMILY MEDICINE

## 2025-04-22 PROCEDURE — 97163 PT EVAL HIGH COMPLEX 45 MIN: CPT

## 2025-04-22 PROCEDURE — 99232 SBSQ HOSP IP/OBS MODERATE 35: CPT | Performed by: INTERNAL MEDICINE

## 2025-04-22 PROCEDURE — 97166 OT EVAL MOD COMPLEX 45 MIN: CPT

## 2025-04-22 RX ORDER — SPIRONOLACTONE 25 MG/1
25 TABLET ORAL DAILY
Status: DISCONTINUED | OUTPATIENT
Start: 2025-04-22 | End: 2025-04-23 | Stop reason: HOSPADM

## 2025-04-22 RX ORDER — FUROSEMIDE 20 MG/1
20 TABLET ORAL DAILY
Status: DISCONTINUED | OUTPATIENT
Start: 2025-04-23 | End: 2025-04-23 | Stop reason: HOSPADM

## 2025-04-22 RX ORDER — POTASSIUM CHLORIDE 1500 MG/1
20 TABLET, EXTENDED RELEASE ORAL DAILY
Status: DISCONTINUED | OUTPATIENT
Start: 2025-04-22 | End: 2025-04-22

## 2025-04-22 RX ADMIN — EMPAGLIFLOZIN 10 MG: 10 TABLET, FILM COATED ORAL at 08:41

## 2025-04-22 RX ADMIN — WARFARIN SODIUM 5 MG: 5 TABLET ORAL at 17:51

## 2025-04-22 RX ADMIN — FERROUS SULFATE TAB 325 MG (65 MG ELEMENTAL FE) 325 MG: 325 (65 FE) TAB at 08:40

## 2025-04-22 RX ADMIN — METOPROLOL TARTRATE 25 MG: 25 TABLET, FILM COATED ORAL at 08:36

## 2025-04-22 RX ADMIN — POTASSIUM CHLORIDE 20 MEQ: 1500 TABLET, EXTENDED RELEASE ORAL at 08:36

## 2025-04-22 RX ADMIN — SPIRONOLACTONE 25 MG: 25 TABLET, FILM COATED ORAL at 12:35

## 2025-04-22 RX ADMIN — TETRAHYDROZOLINE HCL 1 DROP: 0.05 SOLUTION/ DROPS OPHTHALMIC at 08:41

## 2025-04-22 RX ADMIN — ATORVASTATIN CALCIUM 10 MG: 10 TABLET, FILM COATED ORAL at 17:51

## 2025-04-22 RX ADMIN — FUROSEMIDE 40 MG: 40 TABLET ORAL at 08:36

## 2025-04-22 NOTE — ASSESSMENT & PLAN NOTE
Patient with history of mitral valve replacement 20 years ago  Patient is on warfarin 5 mg daily except Sunday 2.5  INR goal 2.5-3.5  Patient INR is 2.12 today, downtrending, he reports been eating broccoli daily while admitted-advised to avoid he also stated he might missed a dose prior to admission  Continue 5 mg tonight, recheck INR in the morning

## 2025-04-22 NOTE — PLAN OF CARE
Problem: PAIN - ADULT  Goal: Verbalizes/displays adequate comfort level or baseline comfort level  Description: Interventions:- Encourage patient to monitor pain and request assistance- Assess pain using appropriate pain scale- Administer analgesics based on type and severity of pain and evaluate response- Implement non-pharmacological measures as appropriate and evaluate response- Consider cultural and social influences on pain and pain management- Notify physician/advanced practitioner if interventions unsuccessful or patient reports new pain  Outcome: Progressing     Problem: INFECTION - ADULT  Goal: Absence or prevention of progression during hospitalization  Description: INTERVENTIONS:- Assess and monitor for signs and symptoms of infection- Monitor lab/diagnostic results- Monitor all insertion sites, i.e. indwelling lines, tubes, and drains- Monitor endotracheal if appropriate and nasal secretions for changes in amount and color- [place] appropriate cooling/warming therapies per order- Administer medications as ordered- Instruct and encourage patient and family to use good hand hygiene technique- Identify and instruct in appropriate isolation precautions for identified infection/condition  Outcome: Progressing  Goal: Absence of fever/infection during neutropenic period  Description: INTERVENTIONS:- Monitor WBC  Outcome: Progressing     Problem: SAFETY ADULT  Goal: Patient will remain free of falls  Description: INTERVENTIONS:- Educate patient/family on patient safety including physical limitations- Instruct patient to call for assistance with activity - Consult OT/PT to assist with strengthening/mobility - Keep Call bell within reach- Keep bed low and locked with side rails adjusted as appropriate- Keep care items and personal belongings within reach- Initiate and maintain comfort rounds- Make Fall Risk Sign visible to staff- Offer Toileting every 2 Hours, in advance of need- Initiate/Maintain 2 alarms-  Obtain necessary fall risk management equipment: yellow bracelet and yellow socks- Apply yellow socks and bracelet for high fall risk patients- Consider moving patient to room near nurses station  INTERVENTIONS:- Educate patient/family on patient safety including physical limitations- Instruct patient to call for assistance with activity - Consult OT/PT to assist with strengthening/mobility - Keep Call bell within reach- Keep bed low and locked with side rails adjusted as appropriate- Keep care items and personal belongings within reach- Initiate and maintain comfort rounds- Make Fall Risk Sign visible to staff- Offer Toileting every 2 Hours, in advance of need- Initiate/Maintain 2 alarms- Obtain necessary fall risk management equipment: yellow bracelet and yellow socks- Apply yellow socks and bracelet for high fall risk patients- Consider moving patient to room near nurses station  Outcome: Progressing  Goal: Maintain or return to baseline ADL function  Description: INTERVENTIONS:-  Assess patient's ability to carry out ADLs; assess patient's baseline for ADL function and identify physical deficits which impact ability to perform ADLs (bathing, care of mouth/teeth, toileting, grooming, dressing, etc.)- Assess/evaluate cause of self-care deficits - Assess range of motion- Assess patient's mobility; develop plan if impaired- Assess patient's need for assistive devices and provide as appropriate- Encourage maximum independence but intervene and supervise when necessary- Involve family in performance of ADLs- Assess for home care needs following discharge - Consider OT consult to assist with ADL evaluation and planning for discharge- Provide patient education as appropriate  Outcome: Progressing  Goal: Maintains/Returns to pre admission functional level  Description: INTERVENTIONS:- Perform AM-PAC 6 Click Basic Mobility/ Daily Activity assessment daily.- Set and communicate daily mobility goal to care team and  patient/family/caregiver. - Collaborate with rehabilitation services on mobility goals if consulted- Perform Range of Motion 2 times a day.- Reposition patient every 2 hours.- Dangle patient 2 times a day- Stand patient 2 times a day- Ambulate patient 2 times a day- Out of bed to chair 2 times a day - Out of bed for meals 2 times a day- Out of bed for toileting- Record patient progress and toleration of activity level   Outcome: Progressing

## 2025-04-22 NOTE — PLAN OF CARE
Problem: Potential for Falls  Goal: Patient will remain free of falls  Description: INTERVENTIONS:- Educate patient/family on patient safety including physical limitations- Instruct patient to call for assistance with activity - Consult OT/PT to assist with strengthening/mobility - Keep Call bell within reach- Keep bed low and locked with side rails adjusted as appropriate- Keep care items and personal belongings within reach- Initiate and maintain comfort rounds- Make Fall Risk Sign visible to staff- Offer Toileting every 2 Hours, in advance of need- Initiate/Maintain 2 alarms- Obtain necessary fall risk management equipment: yellow bracelet and yellow socks- Apply yellow socks and bracelet for high fall risk patients- Consider moving patient to room near nurses station  INTERVENTIONS:- Educate patient/family on patient safety including physical limitations- Instruct patient to call for assistance with activity - Consult OT/PT to assist with strengthening/mobility - Keep Call bell within reach- Keep bed low and locked with side rails adjusted as appropriate- Keep care items and personal belongings within reach- Initiate and maintain comfort rounds- Make Fall Risk Sign visible to staff- Offer Toileting every 2 Hours, in advance of need- Initiate/Maintain 2 alarms- Obtain necessary fall risk management equipment: yellow bracelet and yellow socks- Apply yellow socks and bracelet for high fall risk patients- Consider moving patient to room near nurses station  Outcome: Progressing     Problem: PAIN - ADULT  Goal: Verbalizes/displays adequate comfort level or baseline comfort level  Description: Interventions:- Encourage patient to monitor pain and request assistance- Assess pain using appropriate pain scale- Administer analgesics based on type and severity of pain and evaluate response- Implement non-pharmacological measures as appropriate and evaluate response- Consider cultural and social influences on pain and  pain management- Notify physician/advanced practitioner if interventions unsuccessful or patient reports new pain  Outcome: Progressing     Problem: INFECTION - ADULT  Goal: Absence or prevention of progression during hospitalization  Description: INTERVENTIONS:- Assess and monitor for signs and symptoms of infection- Monitor lab/diagnostic results- Monitor all insertion sites, i.e. indwelling lines, tubes, and drains- Monitor endotracheal if appropriate and nasal secretions for changes in amount and color- [place] appropriate cooling/warming therapies per order- Administer medications as ordered- Instruct and encourage patient and family to use good hand hygiene technique- Identify and instruct in appropriate isolation precautions for identified infection/condition  Outcome: Progressing  Goal: Absence of fever/infection during neutropenic period  Description: INTERVENTIONS:- Monitor WBC  Outcome: Progressing     Problem: SAFETY ADULT  Goal: Patient will remain free of falls  Description: INTERVENTIONS:- Educate patient/family on patient safety including physical limitations- Instruct patient to call for assistance with activity - Consult OT/PT to assist with strengthening/mobility - Keep Call bell within reach- Keep bed low and locked with side rails adjusted as appropriate- Keep care items and personal belongings within reach- Initiate and maintain comfort rounds- Make Fall Risk Sign visible to staff- Offer Toileting every 2 Hours, in advance of need- Initiate/Maintain 2 alarms- Obtain necessary fall risk management equipment: yellow bracelet and yellow socks- Apply yellow socks and bracelet for high fall risk patients- Consider moving patient to room near nurses station  INTERVENTIONS:- Educate patient/family on patient safety including physical limitations- Instruct patient to call for assistance with activity - Consult OT/PT to assist with strengthening/mobility - Keep Call bell within reach- Keep bed low and  locked with side rails adjusted as appropriate- Keep care items and personal belongings within reach- Initiate and maintain comfort rounds- Make Fall Risk Sign visible to staff- Offer Toileting every 2 Hours, in advance of need- Initiate/Maintain 2 alarms- Obtain necessary fall risk management equipment: yellow bracelet and yellow socks- Apply yellow socks and bracelet for high fall risk patients- Consider moving patient to room near nurses station  Outcome: Progressing  Goal: Maintain or return to baseline ADL function  Description: INTERVENTIONS:-  Assess patient's ability to carry out ADLs; assess patient's baseline for ADL function and identify physical deficits which impact ability to perform ADLs (bathing, care of mouth/teeth, toileting, grooming, dressing, etc.)- Assess/evaluate cause of self-care deficits - Assess range of motion- Assess patient's mobility; develop plan if impaired- Assess patient's need for assistive devices and provide as appropriate- Encourage maximum independence but intervene and supervise when necessary- Involve family in performance of ADLs- Assess for home care needs following discharge - Consider OT consult to assist with ADL evaluation and planning for discharge- Provide patient education as appropriate  Outcome: Progressing  Goal: Maintains/Returns to pre admission functional level  Description: INTERVENTIONS:- Perform AM-PAC 6 Click Basic Mobility/ Daily Activity assessment daily.- Set and communicate daily mobility goal to care team and patient/family/caregiver. - Collaborate with rehabilitation services on mobility goals if consulted- Perform Range of Motion 2 times a day.- Reposition patient every 2 hours.- Dangle patient 2 times a day- Stand patient 2 times a day- Ambulate patient 2 times a day- Out of bed to chair 2 times a day - Out of bed for meals 2 times a day- Out of bed for toileting- Record patient progress and toleration of activity level   Outcome: Progressing      Problem: DISCHARGE PLANNING  Goal: Discharge to home or other facility with appropriate resources  Description: INTERVENTIONS:- Identify barriers to discharge w/patient and caregiver- Arrange for needed discharge resources and transportation as appropriate- Identify discharge learning needs (meds, wound care, etc.)- Arrange for interpretive services to assist at discharge as needed- Refer to Case Management Department for coordinating discharge planning if the patient needs post-hospital services based on physician/advanced practitioner order or complex needs related to functional status, cognitive ability, or social support system  Outcome: Progressing     Problem: Knowledge Deficit  Goal: Patient/family/caregiver demonstrates understanding of disease process, treatment plan, medications, and discharge instructions  Description: Complete learning assessment and assess knowledge base.Interventions:- Provide teaching at level of understanding- Provide teaching via preferred learning methods  Outcome: Progressing     Problem: Decreased Cardiac Output  Goal: Cardiac output adequate for individual needs  Description: INTERVENTIONS: Monitor for signs and symptoms of decreased cardiac output - Monitor for dyspnea with exertion and at rest- Monitor for orthopnea- Monitor for signs of tachycardia. Place patient on telemetry monitoring.- Assess patient for jugular vein distention- Assess patient for lower extremity edema and poor peripheral perfusion - Auscultate lung sound for Fine bibasilar crackles - Monitor for cardiac arrythmias - Administer beta blockers, antiarrhythmic, and blood pressure medications as ordered  Outcome: Progressing     Problem: Impaired Gas Exchange  Goal: Optimize oxygenation and ensure adequate ventilation  Description: INTERVENTIONS: Monitor for signs and symptoms of respiratory distress              - Elevate HOB or use high fowlers to promote lung expansion              - Administer oxygen as  ordered to maintain adequate oxygenation              - Encourage use of IS to promote lung expansion and prevent PN              - Monitor ABGs to assess oxygenation status              - Monitor blood oxygen level to maintain adequate oxygenation              - Encourage cough and deep breathing exercises to promote lung expansion              - Monitor patient's mental status for increased confusion  Outcome: Progressing     Problem: Excess Fluid Volume  Goal: Patient is able to achieve and maintain homeostasis  Description: INTERVENTIONS: Monitor for sign and symptoms of fluid overload- Evaluate LE edema every shift- Elevate LE to prevent dependent edema- Apply WENDY stockings as ordered - Monitor ankle circumference daily- Assess for jugular vein distention- Evaluate provider orders for the CHF diuretic algorithm. Administer diuretics as ordered- Weigh the patient daily at 0600 and report a weight gain of five pounds or more - Strict intake and output- Monitor fluid intake and adhere to fluid restrictions- Assess lung sounds every shift and as needed- Monitor vital signs and lab values (CBC, chem, BUN, BNP)- Measure and document urine output  Outcome: Progressing     Problem: Activity Intolerance  Goal: Patient is able to perform activities within their limitations  Description: INTERVENTIONS:                     -   Alternate periods of activity with periods of rest               -   Patients is able to maintain normal vitals heart rhythm during activity               -   Gradually increase activity and exercise as patient can tolerate               -   Monitor blood pressure and heart before and after exercise                -   Monitor blood oxygen saturation during activity and apply oxygen as needed  Outcome: Progressing     Problem: Knowledge Deficit  Goal: Patient is able to verbalize understanding of Heart Failure after education  Description: INTERVENTIONS:- Educate the patient and family on signs and  symptoms of HF- Provide the patient with HF education and HF zone tool- Educate on the importance of daily weight in the AM and reporting a weight gain             of 3 or more pounds to their primary care physician- Monitor for SOB- Maintain and sodium and fluid restriction- Educate the patient on the importance of medications such as: diuretics, betablockers,             antiarrhythmics and their purpose, dose, route, side effects and labs             if they are needed  Outcome: Progressing     Problem: Nutrition/Hydration-ADULT  Goal: Nutrient/Hydration intake appropriate for improving, restoring or maintaining nutritional needs  Description: Monitor and assess patient's nutrition/hydration status for malnutrition. Collaborate with interdisciplinary team and initiate plan and interventions as ordered.  Monitor patient's weight and dietary intake as ordered or per policy. Utilize nutrition screening tool and intervene as necessary. Determine patient's food preferences and provide high-protein, high-caloric foods as appropriate. INTERVENTIONS:- Monitor oral intake, urinary output, labs, and treatment plans- Assess nutrition and hydration status and recommend course of action- Evaluate amount of meals eaten- Assist patient with eating if necessary - Allow adequate time for meals- Recommend/ encourage appropriate diets, oral nutritional supplements, and vitamin/mineral supplements- Order, calculate, and assess calorie counts as needed- Recommend, monitor, and adjust tube feedings and TPN/PPN based on assessed needs- Assess need for intravenous fluids- Provide specific nutrition/hydration education as appropriate- Include patient/family/caregiver in decisions related to nutrition  Outcome: Progressing

## 2025-04-22 NOTE — CASE MANAGEMENT
Case Management Discharge Planning Note    Patient name Larry Jenkins  Location /-01 MRN [id number]  : 1935 Date 2025       Current Admission Date: 2025  Current Admission Diagnosis:Acute exacerbation of CHF (congestive heart failure) (HCC)   Patient Active Problem List    Diagnosis Date Noted Date Diagnosed    Acute exacerbation of CHF (congestive heart failure) (HCC) 2025     Pacemaker 2025     Hematuria 2025     Chronic systolic heart failure (HCC) 2025     SOB (shortness of breath) 2025     Elevated troponin 2025     History of mitral valve replacement 2025     CKD (chronic kidney disease) 2024     Advanced care planning/counseling discussion 2023     COVID-19 2022     Elevated CK 2022     Chronic a-fib (HCC) 2022     Depression, recurrent (HCC) 2022     Stage 3a chronic kidney disease (HCC) 2022     Primary osteoarthritis of both hips 2021     Trochanteric bursitis of both hips 2021     Paroxysmal atrial fibrillation (HCC) 2018     GI bleed 2018     Coagulopathy ([facility]) 2018     Hyperglycemia 2016     Hypocalcemia 2016     Herpes zoster 2015     Insomnia 2013     Anxiety 2012     Arthritis 2012     Anemia of chronic disease 2012     Coronary artery disease 2012     Mixed hyperlipidemia 2012     Hypertension 2012       LOS (days): 2  Geometric Mean LOS (GMLOS) (days): 3.9  Days to GMLOS:1.9     OBJECTIVE:  Risk of Unplanned Readmission Score: 18.13         Current admission status: Inpatient   Preferred Pharmacy:   [facility] - [address] [address]  Phone: [phone] Fax: [phone]    [id number] - [place] - [id number] [facility]  [id number] [facility]  [place] [address]  Phone: [phone] Fax:  [phone]    Primary Care Provider: Andrey Bateman MD    Primary Insurance: MEDICARE  Secondary Insurance: [facility]    DISCHARGE DETAILS:  Additional Comments: CM met with pt at bedside for continued discharge planning discussion. CM discussed therapy rec of HHC at this time. Pt reported he did not want HHC as he did not feel it would do anything for him. No referrals made at this time. CM department to remain available for discharge planning needs throughout this admission.

## 2025-04-22 NOTE — ASSESSMENT & PLAN NOTE
"Wt Readings from Last 3 Encounters:   04/21/25 78.1 kg (172 lb 2.9 oz)   04/15/25 78.5 kg (173 lb)   03/07/25 79.4 kg (175 lb)   Patient presented with shortness of breath for several weeks, admitted in February due to similar symptoms  Troponins 202> 215, BNP 3092  EKG with paced rhythm  Echo in 2018 showed left ventricular ejection fraction of 40 to 45%, repeat ECHO  Patient is on home 20 mg Lasix daily  Plan  Continue metoprolol  Hold lisinopril due to elevated creatinine  Continue telemetry monitor  Strict I's and O's  Fluid and salt restrictions   Echocardiogram from 4/21:\"The left ventricular ejection fraction is 25-30%. Systolic function is severely reduced. There is severe global hypokinesis with regional variation \"  Cardiology involved, transitioned to oral Lasix, initiated Jardiance     "

## 2025-04-22 NOTE — PHYSICAL THERAPY NOTE
PHYSICAL THERAPY EVALUATION NOTE      Patient Name: Larry Jenkins  Today's Date: 2025    AGE:   89 y.o.  Mrn:   [id number]  ADMIT DX:  SOB (shortness of breath) [R06.02]  DELGADILLO (dyspnea on exertion) [R06.09]  CHF exacerbation (HCC) [I50.9]    Past Medical History:   Diagnosis Date    Amputation of toe (HCC)     3rd toe;  accident    Anemia     Arthritis     Coronary artery disease     Depression     Myocardial infarction (HCC)     Palliative care patient 2024    Shingles      Length Of Stay: 2  PHYSICAL THERAPY EVALUATION :   Patient's identity confirmed via 2 patient identifiers (full name and ) at start of session       25 1205   PT Last Visit   PT Visit Date 25   Note Type   Note type Evaluation   Pain Assessment   Pain Assessment Tool 0-10   Pain Score No Pain   Restrictions/Precautions   Weight Bearing Precautions Per Order No   Other Precautions Fall Risk;Telemetry   Home Living   Type of Home House  (Ranch home)   Home Layout One level;Performs ADLs on one level;Able to live on main level with bedroom/bathroom  (2 YANETH with R handrail)   Bathroom Shower/Tub Walk-in shower   Bathroom Toilet Standard   Bathroom Equipment Shower chair   Home Equipment Walker;Cane   Additional Comments Pt only uses a cane if feeling tired or when he leaves the house   Prior Function   Level of [place] Independent with ADLs;Independent with functional mobility;Independent with IADLS   Lives With Alone   Receives Help From Neighbor  (but has a neighbor who watches out for home)   IADLs Independent with medication management;Independent with meal prep;Independent with driving   Falls in the last 6 months 0   Vocational Retired   General   Family/Caregiver Present No   Cognition   Overall Cognitive Status WFL   Arousal/Participation Alert   Orientation Level Oriented X4   Memory Within functional limits  "  Following Commands Follows all commands and directions without difficulty   Comments Pt received supine in bed, agreeable to therapy session. Pt soft spoke and flat affect   Subjective   Subjective \"They let me walk to the bathroom myself now\".   RUE Assessment   RUE Assessment WFL   LUE Assessment   LUE Assessment WFL   RLE Assessment   RLE Assessment WFL   LLE Assessment   LLE Assessment WFL   Light Touch   RLE Light Touch Grossly intact   LLE Light Touch Grossly intact   Bed Mobility   Supine to Sit 6  Modified independent   Additional items HOB elevated;Bedrails;Increased time required   Transfers   Sit to Stand 5  Supervision   Additional items Armrests   Stand to Sit 5  Supervision   Additional items Armrests   Ambulation/Elevation   Gait pattern   (reciprocal pattern, mild narrow BENNY, mild path deviation that improved as trial progressed)   Gait Assistance 5  Supervision  (initially CG assist but quickly progressed to Supervision level)   Additional items Assist x 1   Assistive Device   (no A.D.)   Distance 300ft   Stair Management Assistance 5  Supervision   Additional items Assist x 1   Stair Management Technique One rail R;Reciprocal  (for ascent and lateral side step in step to pattern for descent.)   Number of Stairs 2   Ambulation/Elevation Additional Comments discussed use of cane with pt at home and during recovery period. Pt verbalized understanding and agreement.   Balance   Static Sitting Good   Dynamic Sitting Good   Static Standing Fair +   Dynamic Standing Fair   Ambulatory Fair   Activity Tolerance   Activity Tolerance Patient tolerated treatment well   Medical Staff Made Aware KIRSTY Ronquillo   Nurse Made Aware yes   Assessment   Prognosis Good   Assessment Larry Jenkins is a 89 y.o. Male who presents to [facility] on 4/19/25 from home w/ c/o SOB and diagnosis of Acute CHF exacerbation. Orders for PT eval and treat received, w/ activity orders of up and out of bed as tolerated and fall " precautions. Pt presents w/ comorbidities of CABG 20 years ago, mitral valve replacement 20 years ago, CAD, A-fib status post pacemaker placed 20 years ago, hypertension, hyperlipidemia, heart failure with reduced EF. At baseline, pt mobilizes independently w/ a cane as needed and reports 0 falls in the last 6 months. Upon evaluation, pt presents w/ the following deficits: impaired coordination and decreased endurance. Pt currently demonstrates modified independence for bed mobility, supervision for transfers, and supervision without a device for ambulation and stair negotiation using a handrail. Reviewed with pt to use his cane at home when ambulating while he is recovery from the hospitalization. Pt verbalized understanding and agreement. The patient's AM-PAC Basic Mobility Inpatient Short Form Raw Score is 20. A Raw score of greater than 17 suggests the patient may benefit from discharge to home. Please also refer to the recommendation of the Physical Therapist for safe discharge planning. Based on current status, Level 3 rehab intensity is recommended at time of discharge. At this time, pt has no further acute PT needs and is D/C from PT services.   Goals   Patient Goals to go home today   Plan   PT Frequency   (D/C PT services)   Discharge Recommendation   Rehab Resource Intensity Level, PT III (Minimum Resource Intensity)   AM-PAC Basic Mobility Inpatient   Turning in Flat Bed Without Bedrails 4   Lying on Back to Sitting on Edge of Flat Bed Without Bedrails 3   Moving Bed to Chair 3   Standing Up From Chair Using Arms 4   Walk in Room 3   Climb 3-5 Stairs With Railing 3   Basic Mobility Inpatient Raw Score 20   Basic Mobility Standardized Score 43.99   [facility] Highest Level Of Mobility   -HLM Goal 6: Walk 10 steps or more   -HLM Achieved 8: Walk 250 feet ot more   End of Consult   Patient Position at End of Consult Bedside chair;Bed/Chair alarm activated;All needs within reach  (BLE elevated, tray  table in front)       The patient's AM-PAC Basic Mobility Inpatient Short Form Raw Score is 20, Standardized Score is 43.99. A standardized score greater than 38.32 (raw score of 16) suggests the patient may benefit from discharge to post-acute rehabilitation services which may not coincide with above PT recommendations. However please refer to therapist recommendation for discharge planning given other factors that may influence destination.      González Cortes, PT

## 2025-04-22 NOTE — ASSESSMENT & PLAN NOTE
History of CABG in 2001  GDMT: statin, b.blocker,   patient reports constipation with both isosorbide and Ranexa self discontinued

## 2025-04-22 NOTE — CASE MANAGEMENT
Case Management Discharge Planning Note    Patient name Larry Jenkins  Location /-01 MRN [id number]  : 1935 Date 2025       Current Admission Date: 2025  Current Admission Diagnosis:Acute exacerbation of CHF (congestive heart failure) (HCC)   Patient Active Problem List    Diagnosis Date Noted Date Diagnosed    Acute exacerbation of CHF (congestive heart failure) (HCC) 2025     Pacemaker 2025     Hematuria 2025     Chronic systolic heart failure (HCC) 2025     SOB (shortness of breath) 2025     Elevated troponin 2025     History of mitral valve replacement 2025     CKD (chronic kidney disease) 2024     Advanced care planning/counseling discussion 2023     COVID-19 2022     Elevated CK 2022     Chronic a-fib (HCC) 2022     Depression, recurrent (HCC) 2022     Stage 3a chronic kidney disease (HCC) 2022     Primary osteoarthritis of both hips 2021     Trochanteric bursitis of both hips 2021     Paroxysmal atrial fibrillation (HCC) 2018     GI bleed 2018     Coagulopathy ([facility]) 2018     Hyperglycemia 2016     Hypocalcemia 2016     Herpes zoster 2015     Insomnia 2013     Anxiety 2012     Arthritis 2012     Anemia of chronic disease 2012     Coronary artery disease 2012     Mixed hyperlipidemia 2012     Hypertension 2012       LOS (days): 2  Geometric Mean LOS (GMLOS) (days): 3.9  Days to GMLOS:2.1     OBJECTIVE:  Risk of Unplanned Readmission Score: 18.27         Current admission status: Inpatient   Preferred Pharmacy:   [facility] - [address] [address]  Phone: [phone] Fax: [phone]    [id number] - [place] - [id number] [facility]  [id number] [facility]  [place] [address]  Phone: [phone] Fax:  [phone]    Primary Care Provider: Andrey Bateman MD    Primary Insurance: MEDICARE  Secondary Insurance: [facility]    DISCHARGE DETAILS:      Other Referral/Resources/Interventions Provided:  Interventions: Prescription Price Check  Referral Comments: CM contacted pt's pharmacy and completed price check for Jardiance. Pt has $472.31 copay. Both SLIM and cards providers made aware of cost.

## 2025-04-22 NOTE — PROGRESS NOTES
Progress Note - Cardiology   Name: Larry Jenkins 89 y.o. male I MRN: [id number]  Unit/Bed#: -01 I Date of Admission: 4/19/2025   Date of Service: 4/22/2025 I Hospital Day: 2    Assessment & Plan  Acute exacerbation of CHF (congestive heart failure) (HCC)  Wt Readings from Last 3 Encounters:   04/21/25 78.1 kg (172 lb 2.9 oz)   04/15/25 78.5 kg (173 lb)   03/07/25 79.4 kg (175 lb)   BNP: 3092, Troponin 202<215<207  CXR: Mild pulmonary edema, no pleural effusion  Echo: EF 25 to 30%, severe global hypokinesis with regional variation, RV dilatation with moderately to systolic function, LA dilatation, RA dilatation, mild AR, mechanical mitral valve in place, mild aortic root dilatation.  Home diuretic: Furosemide 20 mg PO. daily  Initially received IV diuretics with good effect - patient currently examines euvolemic    GDMT: Furosemide 20 mg daily, Metoprolol tartrate 25 mg twice daily, Lisinopril 5 mg daily -  add Spironolactone 25 mg daily   Jardiance cost prohibitive, lisinopril on hold in setting of increased creatinine   Coronary artery disease  History of CABG in 2001  GDMT: statin, b.blocker,   patient reports constipation with both isosorbide and Ranexa self discontinued  Hypertension  Lisinopril held secondary to CHRISTINE   CKD (chronic kidney disease)  Creatinine 1.3  on arrival -trending down with diuresis  Elevated troponin  Non-MI related elevation in troponin secondary to volume overload.  History of mitral valve replacement  Mechanical mitral valve -patient on warfarin,  subtheraputic - INR 2.1 today   Pacemaker  Medtronic pacemaker -V paced at this time  Recommendations/Plan  Patient cleared for discharge from cardiology perspective  Patient will follow up with cardiologist within the next week at [facility], his son is helping him arrange     Continue to hold lisinopril on discharge - can be restarted in outpatient setting  Continue Metoprolol, Furosamide upon discharge  Add spironolactone 25 mg daily    Patient subtherapeutic with INR of 2.1 today -if discharged will need Lovenox until patient at goal of 2.5-3.5    Subjective   Patient denies pain     Objective :  Temp:  [97.2 °F (36.2 °C)-98 °F (36.7 °C)] 98 °F (36.7 °C)  HR:  [] 75  BP: (100-116)/(52-66) 116/60  Resp:  [12-18] 12  SpO2:  [93 %-98 %] 94 %  O2 Device: None (Room air)  Orthostatic Blood Pressures      Flowsheet Row Most Recent Value   Blood Pressure 116/60 filed at 04/22/2025 0803   Patient Position - Orthostatic VS Sitting filed at 04/22/2025 0804          First Weight: Weight - Scale: 78.1 kg (172 lb 2.9 oz) (04/19/25 1544)  Vitals:    04/19/25 1544 04/21/25 1005   Weight: 78.1 kg (172 lb 2.9 oz) 78.1 kg (172 lb 2.9 oz)     Physical Exam  Constitutional:       Appearance: Normal appearance.   HENT:      Head: Normocephalic and atraumatic.      Nose: Nose normal.      Mouth/Throat:      Mouth: Mucous membranes are moist.   Cardiovascular:      Rate and Rhythm: Normal rate and regular rhythm.      Pulses: Normal pulses.      Heart sounds: Normal heart sounds.   Pulmonary:      Effort: Pulmonary effort is normal.      Breath sounds: Normal breath sounds.   Abdominal:      General: Bowel sounds are normal.      Palpations: Abdomen is soft.   Musculoskeletal:      Cervical back: Normal range of motion.      Right lower leg: No edema.      Left lower leg: No edema.   Skin:     General: Skin is warm.   Neurological:      General: No focal deficit present.      Mental Status: He is alert and oriented to person, place, and time. Mental status is at baseline.         Lab Results: I have reviewed the following results:  Results from last 7 days   Lab Units 04/22/25  0510 04/21/25  0607 04/20/25  0446   WBC Thousand/uL 5.07 4.92 5.05   HEMOGLOBIN g/dL 12.8 12.9 12.2   HEMATOCRIT % 40.4 41.1 38.8   PLATELETS Thousands/uL 136* 110* 129*     Results from last 7 days   Lab Units 04/22/25  0510 04/21/25  0607 04/20/25  0446   POTASSIUM mmol/L 3.9 3.2* 3.9    CHLORIDE mmol/L 100 100 106   CO2 mmol/L 32 33* 29   BUN mg/dL 36* 33* 30*   CREATININE mg/dL 1.35* 1.18 1.25   CALCIUM mg/dL 8.6 8.6 8.7     Results from last 7 days   Lab Units 04/22/25  0510 04/21/25  0607 04/20/25  0446 04/19/25  1351   INR  2.12* 2.25* 3.48* 3.79*   PTT seconds  --   --   --  43*     Lab Results   Component Value Date    HGBA1C 5.7 10/15/2018     Lab Results   Component Value Date    CKTOTAL 564 (H) 12/28/2022    CKMB 2.6 12/28/2022    CKMBINDEX <1.0 12/28/2022    TROPONINI 0.07 (H) 11/13/2018       Imaging Results Review: No pertinent imaging studies reviewed.  Other Study Results Review: No additional pertinent studies reviewed.    VTE Pharmacologic Prophylaxis: VTE covered by:  warfarin, Oral, 2.5 mg at 04/20/25 1731  warfarin, Oral, 5 mg at 04/21/25 1843     VTE Mechanical Prophylaxis: sequential compression device

## 2025-04-22 NOTE — OCCUPATIONAL THERAPY NOTE
Occupational Therapy Evaluation     Patient Name: Larry Jenkins  Today's Date: 4/22/2025  Problem List  Principal Problem:    Acute exacerbation of CHF (congestive heart failure) ([facility])  Active Problems:    Coronary artery disease    Hypertension    CKD (chronic kidney disease)    Elevated troponin    History of mitral valve replacement    Pacemaker    Past Medical History  Past Medical History:   Diagnosis Date    Amputation of toe (HCC)     3rd toe;  accident    Anemia     Arthritis     Coronary artery disease     Depression     Myocardial infarction (HCC) 2000    Palliative care patient 02/07/2024    Shingles      Past Surgical History  Past Surgical History:   Procedure Laterality Date    CARDIAC VALVE REPLACEMENT  2000    CATARACT EXTRACTION, BILATERAL      COLONOSCOPY N/A 11/15/2018    Procedure: COLONOSCOPY;  Surgeon: Daly Celeste MD;  Location: [facility];  Service: Gastroenterology    CORONARY ARTERY BYPASS GRAFT      ESOPHAGOGASTRODUODENOSCOPY N/A 11/14/2018    Procedure: ESOPHAGOGASTRODUODENOSCOPY (EGD);  Surgeon: Zhang Cassidy MD;  Location: [facility];  Service: Gastroenterology    EYE SURGERY      Cataracts    MITRAL VALVE REPLACEMENT             04/22/25 1144   OT Last Visit   OT Visit Date 04/22/25   Note Type   Note type Evaluation   Pain Assessment   Pain Assessment Tool 0-10   Pain Score No Pain   Restrictions/Precautions   Weight Bearing Precautions Per Order No   Other Precautions Fall Risk;Telemetry   Home Living   Type of Home House  (ranch)   Home Layout One level;Performs ADLs on one level;Able to live on main level with bedroom/bathroom;Stairs to enter with rails  (2 YANETH w/ R railing; basement)   Bathroom Shower/Tub Walk-in shower   Bathroom Toilet Standard   Bathroom Equipment Shower chair   Home Equipment Walker;Cane   Additional Comments only uses SPC if feeling tired when going out of the house   Prior Function   Level of [place] Independent with ADLs;Independent  "with functional mobility;Independent with IADLS   Lives With Alone   Receives Help From Neighbor  (Pt reports he has a neighbor who watches out for him)   IADLs Independent with medication management;Independent with meal prep;Independent with driving   Falls in the last 6 months 0   Vocational Retired  (; steel work; and worked in [employer])   General   Family/Caregiver Present No   Subjective   Subjective \"Im doing alright.\"   ADL   Eating Assistance 7  Independent   Grooming Assistance 7  Independent   UB Bathing Assistance 6  Modified Independent   LB Bathing Assistance 6  Modified Independent   UB Dressing Assistance 6  Modified independent   LB Dressing Assistance 6  Modified independent   LB Dressing Deficit   (Pt demonstrated overall CARL w/ LB dressing. Pt able to don/doff socks seated EOB.)   Toileting Assistance  6  Modified independent   Bed Mobility   Supine to Sit 6  Modified independent   Additional items HOB elevated;Bedrails;Increased time required   Additional Comments Pt demonstrated sitting EOB x3 minutes at CARL x3 minutes for LB dressing   Transfers   Sit to Stand 5  Supervision   Additional items Armrests   Stand to Sit 5  Supervision   Additional items Armrests   Additional Comments Good placement of hands during transfers   Functional Mobility   Functional Mobility 5  Supervision   Additional items   (no AD; pt initally was min Ax1 for CGA, progressed quickly to supervision. ~300ft)   Balance   Static Sitting Good   Dynamic Sitting Good   Static Standing Fair +   Dynamic Standing Fair   Activity Tolerance   Activity Tolerance Patient tolerated treatment well   Medical Staff Made Aware PT KIRSTY Claudio   Nurse Made Aware MANDA LEIJA Assessment   RUE Assessment WFL   LUE Assessment   LUE Assessment WFL   Hand Function   Gross Motor Coordination Functional   Fine Motor Coordination Functional   Vision-Basic Assessment   Current Vision Wears glasses all " the time   Psychosocial   Psychosocial (WDL) WDL   Cognition   Overall Cognitive Status WFL   Arousal/Participation Alert;Cooperative   Attention Within functional limits   Orientation Level Oriented X4  (use of smart watch for exact date)   Memory Within functional limits   Following Commands Follows all commands and directions without difficulty   Comments Pt willing to particiapte in OT evaluation. Pt flat afffect and soft spoken t/o session   Assessment   Prognosis Good   Assessment Pt is a 89 y.o. male seen for OT evaluation at [facility], admitted 4/19/2025 w/ Acute exacerbation of CHF (congestive heart failure) (HCC). Extensive chart review completed by OT. Comorbidities affecting the pt's functional performance include a significant PMH of: CAD, HTN, CKD, h/o mitral valve replacement, CHF, anxiety, arthritis, hyperglycemia, HLD, coagulopathy, A-fib, depression, OA of b/l hips, SOB. Personal factors affecting pt at time of IE include: steps to enter environment, limited home support, health management , and environment. PTA pt was living in a 1 SH w/ 2 YANETH (R railing), was  IND w/ ADLs and IND w/ IADLS, and IND with functional mobility with use of no DME/AD (SPC for outside the home when feeling tired), (+) driving, (+) home alone. Upon OT IE pt demonstrated  CARL for bed mobility, supervision for functional transfers, supervision for functional mobility w/o AD, CARL for UB ADLs and CARL for LB ADLS 2* the following deficits impacting occupational performance: weakness and decreased tolerance. The patient's raw score on the AM-PAC Daily Activity inpatient short form is 24, standardized score is 57.54, greater than 39.4. Patients at this level are likely to benefit from DC to home. However, please refer to therapist recommendation for discharge planning given other factors that may influence destination. At this time, OT recommendations at time of discharge are DC with No rehabilitation needs resources. Based on OT  IE, no further IP OT needs are indicated at this time. This OT recommends pt to continue to be OOB for meals, ambulation to/from BR, perform self care tasks, and mobility in hallway with nursing. D/C from OT caseload with above recommendations.   Goals   Patient Goals To leave today   Plan   OT Frequency Eval only   Discharge Recommendation   Rehab Resource Intensity Level, OT No post-acute rehabilitation needs   AM-PAC Daily Activity Inpatient   Lower Body Dressing 4   Bathing 4   Toileting 4   Upper Body Dressing 4   Grooming 4   Eating 4   Daily Activity Raw Score 24   Daily Activity Standardized Score (Calc for Raw Score >=11) 57.54   AM-PAC Applied Cognition Inpatient   Following a Speech/Presentation 4   Understanding Ordinary Conversation 4   Taking Medications 4   Remembering Where Things Are Placed or Put Away 4   Remembering List of 4-5 Errands 4   Taking Care of Complicated Tasks 4   Applied Cognition Raw Score 24   Applied Cognition Standardized Score 62.21   End of Consult   Education Provided Yes   Patient Position at End of Consult Bedside chair;Bed/Chair alarm activated;All needs within reach   Nurse Communication Nurse aware of consult       Luna Devi OTR/L

## 2025-04-22 NOTE — ASSESSMENT & PLAN NOTE
Wt Readings from Last 3 Encounters:   04/21/25 78.1 kg (172 lb 2.9 oz)   04/15/25 78.5 kg (173 lb)   03/07/25 79.4 kg (175 lb)   BNP: 3092, Troponin 202<215<207  CXR: Mild pulmonary edema, no pleural effusion  Echo: EF 25 to 30%, severe global hypokinesis with regional variation, RV dilatation with moderately to systolic function, LA dilatation, RA dilatation, mild AR, mechanical mitral valve in place, mild aortic root dilatation.  Home diuretic: Furosemide 20 mg PO. daily  Initially received IV diuretics with good effect - patient currently examines euvolemic    GDMT: Furosemide 20 mg daily, Metoprolol tartrate 25 mg twice daily, Lisinopril 5 mg daily -  add Spironolactone 25 mg daily   Jardiance cost prohibitive, lisinopril on hold in setting of increased creatinine

## 2025-04-22 NOTE — ASSESSMENT & PLAN NOTE
Lab Results   Component Value Date    EGFR 46 04/22/2025    EGFR 54 04/21/2025    EGFR 50 04/20/2025    CREATININE 1.35 (H) 04/22/2025    CREATININE 1.18 04/21/2025    CREATININE 1.25 04/20/2025   Patient with past medical history of CKD  Patient creatinine on admission 1.32  Probably in setting of CHF with cardiorenal etiology  Avoid nephrotoxic agents  Continue IV diuresis  Continue to monitor creatinine, I/O  Uptrended on 4/22, will monitor in a.m.

## 2025-04-22 NOTE — PLAN OF CARE
Problem: Potential for Falls  Goal: Patient will remain free of falls  Description: INTERVENTIONS:- Educate patient/family on patient safety including physical limitations- Instruct patient to call for assistance with activity - Consult OT/PT to assist with strengthening/mobility - Keep Call bell within reach- Keep bed low and locked with side rails adjusted as appropriate- Keep care items and personal belongings within reach- Initiate and maintain comfort rounds- Make Fall Risk Sign visible to staff- Offer Toileting every 2 Hours, in advance of need- Initiate/Maintain 2 alarms- Obtain necessary fall risk management equipment: yellow bracelet and yellow socks- Apply yellow socks and bracelet for high fall risk patients- Consider moving patient to room near nurses station  INTERVENTIONS:- Educate patient/family on patient safety including physical limitations- Instruct patient to call for assistance with activity - Consult OT/PT to assist with strengthening/mobility - Keep Call bell within reach- Keep bed low and locked with side rails adjusted as appropriate- Keep care items and personal belongings within reach- Initiate and maintain comfort rounds- Make Fall Risk Sign visible to staff- Offer Toileting every 2 Hours, in advance of need- Initiate/Maintain 2 alarms- Obtain necessary fall risk management equipment: yellow bracelet and yellow socks- Apply yellow socks and bracelet for high fall risk patients- Consider moving patient to room near nurses station  4/22/2025 0846 by Nancy Jang RN  Outcome: Progressing  4/22/2025 0846 by Nancy Jang RN  Outcome: Progressing     Problem: PAIN - ADULT  Goal: Verbalizes/displays adequate comfort level or baseline comfort level  Description: Interventions:- Encourage patient to monitor pain and request assistance- Assess pain using appropriate pain scale- Administer analgesics based on type and severity of pain and evaluate response- Implement non-pharmacological  measures as appropriate and evaluate response- Consider cultural and social influences on pain and pain management- Notify physician/advanced practitioner if interventions unsuccessful or patient reports new pain  4/22/2025 0846 by Nancy Jang RN  Outcome: Progressing  4/22/2025 0846 by Nancy Jang RN  Outcome: Progressing     Problem: INFECTION - ADULT  Goal: Absence or prevention of progression during hospitalization  Description: INTERVENTIONS:- Assess and monitor for signs and symptoms of infection- Monitor lab/diagnostic results- Monitor all insertion sites, i.e. indwelling lines, tubes, and drains- Monitor endotracheal if appropriate and nasal secretions for changes in amount and color- [place] appropriate cooling/warming therapies per order- Administer medications as ordered- Instruct and encourage patient and family to use good hand hygiene technique- Identify and instruct in appropriate isolation precautions for identified infection/condition  4/22/2025 0846 by Nancy Jang RN  Outcome: Progressing  4/22/2025 0846 by Nancy Jang RN  Outcome: Progressing  Goal: Absence of fever/infection during neutropenic period  Description: INTERVENTIONS:- Monitor WBC  4/22/2025 0846 by Nancy Jang RN  Outcome: Progressing  4/22/2025 0846 by Nancy Jang RN  Outcome: Progressing     Problem: SAFETY ADULT  Goal: Patient will remain free of falls  Description: INTERVENTIONS:- Educate patient/family on patient safety including physical limitations- Instruct patient to call for assistance with activity - Consult OT/PT to assist with strengthening/mobility - Keep Call bell within reach- Keep bed low and locked with side rails adjusted as appropriate- Keep care items and personal belongings within reach- Initiate and maintain comfort rounds- Make Fall Risk Sign visible to staff- Offer Toileting every 2 Hours, in advance of need- Initiate/Maintain 2 alarms- Obtain necessary fall risk management equipment:  yellow bracelet and yellow socks- Apply yellow socks and bracelet for high fall risk patients- Consider moving patient to room near nurses station  INTERVENTIONS:- Educate patient/family on patient safety including physical limitations- Instruct patient to call for assistance with activity - Consult OT/PT to assist with strengthening/mobility - Keep Call bell within reach- Keep bed low and locked with side rails adjusted as appropriate- Keep care items and personal belongings within reach- Initiate and maintain comfort rounds- Make Fall Risk Sign visible to staff- Offer Toileting every 2 Hours, in advance of need- Initiate/Maintain 2 alarms- Obtain necessary fall risk management equipment: yellow bracelet and yellow socks- Apply yellow socks and bracelet for high fall risk patients- Consider moving patient to room near nurses station  4/22/2025 0846 by Nancy Jang RN  Outcome: Progressing  4/22/2025 0846 by Nancy Jang RN  Outcome: Progressing  Goal: Maintain or return to baseline ADL function  Description: INTERVENTIONS:-  Assess patient's ability to carry out ADLs; assess patient's baseline for ADL function and identify physical deficits which impact ability to perform ADLs (bathing, care of mouth/teeth, toileting, grooming, dressing, etc.)- Assess/evaluate cause of self-care deficits - Assess range of motion- Assess patient's mobility; develop plan if impaired- Assess patient's need for assistive devices and provide as appropriate- Encourage maximum independence but intervene and supervise when necessary- Involve family in performance of ADLs- Assess for home care needs following discharge - Consider OT consult to assist with ADL evaluation and planning for discharge- Provide patient education as appropriate  4/22/2025 0846 by Nancy Jang RN  Outcome: Progressing  4/22/2025 0846 by Nancy Jang RN  Outcome: Progressing  Goal: Maintains/Returns to pre admission functional level  Description:  INTERVENTIONS:- Perform AM-PAC 6 Click Basic Mobility/ Daily Activity assessment daily.- Set and communicate daily mobility goal to care team and patient/family/caregiver. - Collaborate with rehabilitation services on mobility goals if consulted- Perform Range of Motion 2 times a day.- Reposition patient every 2 hours.- Dangle patient 2 times a day- Stand patient 2 times a day- Ambulate patient 2 times a day- Out of bed to chair 2 times a day - Out of bed for meals 2 times a day- Out of bed for toileting- Record patient progress and toleration of activity level   4/22/2025 0846 by Nancy Jang RN  Outcome: Progressing  4/22/2025 0846 by Nancy Jang RN  Outcome: Progressing     Problem: DISCHARGE PLANNING  Goal: Discharge to home or other facility with appropriate resources  Description: INTERVENTIONS:- Identify barriers to discharge w/patient and caregiver- Arrange for needed discharge resources and transportation as appropriate- Identify discharge learning needs (meds, wound care, etc.)- Arrange for interpretive services to assist at discharge as needed- Refer to Case Management Department for coordinating discharge planning if the patient needs post-hospital services based on physician/advanced practitioner order or complex needs related to functional status, cognitive ability, or social support system  4/22/2025 0846 by Nancy Jang RN  Outcome: Progressing  4/22/2025 0846 by Nancy Jang RN  Outcome: Progressing     Problem: Knowledge Deficit  Goal: Patient/family/caregiver demonstrates understanding of disease process, treatment plan, medications, and discharge instructions  Description: Complete learning assessment and assess knowledge base.Interventions:- Provide teaching at level of understanding- Provide teaching via preferred learning methods  4/22/2025 0846 by Nnacy Jang RN  Outcome: Progressing  4/22/2025 0846 by Nancy Jang RN  Outcome: Progressing     Problem: Decreased Cardiac  Output  Goal: Cardiac output adequate for individual needs  Description: INTERVENTIONS: Monitor for signs and symptoms of decreased cardiac output - Monitor for dyspnea with exertion and at rest- Monitor for orthopnea- Monitor for signs of tachycardia. Place patient on telemetry monitoring.- Assess patient for jugular vein distention- Assess patient for lower extremity edema and poor peripheral perfusion - Auscultate lung sound for Fine bibasilar crackles - Monitor for cardiac arrythmias - Administer beta blockers, antiarrhythmic, and blood pressure medications as ordered  4/22/2025 0846 by Nancy Jang RN  Outcome: Progressing  4/22/2025 0846 by Nancy Jang RN  Outcome: Progressing     Problem: Impaired Gas Exchange  Goal: Optimize oxygenation and ensure adequate ventilation  Description: INTERVENTIONS: Monitor for signs and symptoms of respiratory distress              - Elevate HOB or use high fowlers to promote lung expansion              - Administer oxygen as ordered to maintain adequate oxygenation              - Encourage use of IS to promote lung expansion and prevent PN              - Monitor ABGs to assess oxygenation status              - Monitor blood oxygen level to maintain adequate oxygenation              - Encourage cough and deep breathing exercises to promote lung expansion              - Monitor patient's mental status for increased confusion  4/22/2025 0846 by Nancy Jang RN  Outcome: Progressing  4/22/2025 0846 by Nancy Jang RN  Outcome: Progressing     Problem: Excess Fluid Volume  Goal: Patient is able to achieve and maintain homeostasis  Description: INTERVENTIONS: Monitor for sign and symptoms of fluid overload- Evaluate LE edema every shift- Elevate LE to prevent dependent edema- Apply WENDY stockings as ordered - Monitor ankle circumference daily- Assess for jugular vein distention- Evaluate provider orders for the CHF diuretic algorithm. Administer diuretics as  ordered- Weigh the patient daily at 0600 and report a weight gain of five pounds or more - Strict intake and output- Monitor fluid intake and adhere to fluid restrictions- Assess lung sounds every shift and as needed- Monitor vital signs and lab values (CBC, chem, BUN, BNP)- Measure and document urine output  4/22/2025 0846 by Nancy Jang RN  Outcome: Progressing  4/22/2025 0846 by Nancy Jang RN  Outcome: Progressing     Problem: Activity Intolerance  Goal: Patient is able to perform activities within their limitations  Description: INTERVENTIONS:                     -   Alternate periods of activity with periods of rest               -   Patients is able to maintain normal vitals heart rhythm during activity               -   Gradually increase activity and exercise as patient can tolerate               -   Monitor blood pressure and heart before and after exercise                -   Monitor blood oxygen saturation during activity and apply oxygen as needed  4/22/2025 0846 by Nancy Jang RN  Outcome: Progressing  4/22/2025 0846 by Nancy Jang RN  Outcome: Progressing     Problem: Knowledge Deficit  Goal: Patient is able to verbalize understanding of Heart Failure after education  Description: INTERVENTIONS:- Educate the patient and family on signs and symptoms of HF- Provide the patient with HF education and HF zone tool- Educate on the importance of daily weight in the AM and reporting a weight gain             of 3 or more pounds to their primary care physician- Monitor for SOB- Maintain and sodium and fluid restriction- Educate the patient on the importance of medications such as: diuretics, betablockers,             antiarrhythmics and their purpose, dose, route, side effects and labs             if they are needed  4/22/2025 0846 by Nancy Jang RN  Outcome: Progressing  4/22/2025 0846 by Nancy Jang RN  Outcome: Progressing     Problem: Nutrition/Hydration-ADULT  Goal:  Nutrient/Hydration intake appropriate for improving, restoring or maintaining nutritional needs  Description: Monitor and assess patient's nutrition/hydration status for malnutrition. Collaborate with interdisciplinary team and initiate plan and interventions as ordered.  Monitor patient's weight and dietary intake as ordered or per policy. Utilize nutrition screening tool and intervene as necessary. Determine patient's food preferences and provide high-protein, high-caloric foods as appropriate. INTERVENTIONS:- Monitor oral intake, urinary output, labs, and treatment plans- Assess nutrition and hydration status and recommend course of action- Evaluate amount of meals eaten- Assist patient with eating if necessary - Allow adequate time for meals- Recommend/ encourage appropriate diets, oral nutritional supplements, and vitamin/mineral supplements- Order, calculate, and assess calorie counts as needed- Recommend, monitor, and adjust tube feedings and TPN/PPN based on assessed needs- Assess need for intravenous fluids- Provide specific nutrition/hydration education as appropriate- Include patient/family/caregiver in decisions related to nutrition  4/22/2025 0846 by Nancy Jang RN  Outcome: Progressing  4/22/2025 0846 by Nancy Jang RN  Outcome: Progressing

## 2025-04-22 NOTE — NURSING NOTE
Met with patient and reviewed CHF education -Up to date education for Heart Failure in Adults. Discussed the Heart failure zones and when to notify physician. Discussed the importance of daily weights and how to establish a daily weight routine. Importance of daily medications and Low Sodium diet reviewed. Patient verbalized understanding . However, verbalized not real interested in changing things due to age and ability.  No questions, Listened to patient  as he expressed sadness over his wife's death 3 yrs ago.  Discussed with assigned nurse, Nancy.   Patient states he has a scale at home.

## 2025-04-22 NOTE — PROGRESS NOTES
"Progress Note - Hospitalist   Name: aLrry Jenkins 89 y.o. male I MRN: [id number]  Unit/Bed#: -01 I Date of Admission: 4/19/2025   Date of Service: 4/22/2025 I Hospital Day: 2    Assessment & Plan  Acute exacerbation of CHF (congestive heart failure) (HCC)  Wt Readings from Last 3 Encounters:   04/21/25 78.1 kg (172 lb 2.9 oz)   04/15/25 78.5 kg (173 lb)   03/07/25 79.4 kg (175 lb)   Patient presented with shortness of breath for several weeks, admitted in February due to similar symptoms  Troponins 202> 215, BNP 3092  EKG with paced rhythm  Echo in 2018 showed left ventricular ejection fraction of 40 to 45%, repeat ECHO  Patient is on home 20 mg Lasix daily  Plan  Continue metoprolol  Hold lisinopril due to elevated creatinine  Continue telemetry monitor  Strict I's and O's  Fluid and salt restrictions   Echocardiogram from 4/21:\"The left ventricular ejection fraction is 25-30%. Systolic function is severely reduced. There is severe global hypokinesis with regional variation \"  Cardiology involved, transitioned to oral Lasix, initiated Jardiance     Elevated troponin  Patient presented with elevated troponin  Probably increased demand in setting of acute CHF  EKG for any chest pain  Patient stopped taking Imdur and Ranexa as it gives him constipation- continue to hold Imdur and Ranexa  History of mitral valve replacement  Patient with history of mitral valve replacement 20 years ago  Patient is on warfarin 5 mg daily except Sunday 2.5  INR goal 2.5-3.5  Patient INR is 2.12 today, downtrending, he reports been eating broccoli daily while admitted-advised to avoid he also stated he might missed a dose prior to admission  Continue 5 mg tonight, recheck INR in the morning  Hypertension  on lisinopril and metoprolol  Continue metoprolol  Hold lisinopril in setting of elevated creatinine level  CKD (chronic kidney disease)  Lab Results   Component Value Date    EGFR 46 04/22/2025    EGFR 54 04/21/2025    EGFR 50 " 04/20/2025    CREATININE 1.35 (H) 04/22/2025    CREATININE 1.18 04/21/2025    CREATININE 1.25 04/20/2025   Patient with past medical history of CKD  Patient creatinine on admission 1.32  Probably in setting of CHF with cardiorenal etiology  Avoid nephrotoxic agents  Continue IV diuresis  Continue to monitor creatinine, I/O  Uptrended on 4/22, will monitor in a.m.  Pacemaker  Patient has history of A-fib with pacemaker placed 20 years ago  Coronary artery disease  Patient with past med history of coronary disease status post CABG 20 years ago  Patient is on metoprolol and statin  Continue current regimen    VTE Pharmacologic Prophylaxis:    on warfarin    Mobility:   Basic Mobility Inpatient Raw Score: 20  JH-HLM Goal: 6: Walk 10 steps or more  JH-HLM Achieved: 8: Walk 250 feet ot more  JH-HLM Goal achieved. Continue to encourage appropriate mobility.    Patient Centered Rounds: I performed bedside rounds with nursing staff today.   Discussions with Specialists or Other Care Team Provider: case management;   Education and Discussions with Family / Patient: Discussed with son at bedside    Current Length of Stay: 2 day(s)  Current Patient Status: Inpatient   Certification Statement: The patient will continue to require additional inpatient hospital stay due to CHF exacerbation management , monitor creatinine  Discharge Plan: Anticipate discharge home tomorrow    Code Status: Level 3 - DNAR and DNI    Subjective   Patient seen and examined, reports feeling well, denies any complaints    Objective :  Temp:  [97.2 °F (36.2 °C)-98 °F (36.7 °C)] 98 °F (36.7 °C)  HR:  [] 93  BP: (100-116)/(52-64) 114/59  Resp:  [12-18] 12  SpO2:  [92 %-98 %] 92 %  O2 Device: None (Room air)    Body mass index is 24.01 kg/m².     Input and Output Summary (last 24 hours):     Intake/Output Summary (Last 24 hours) at 4/22/2025 1353  Last data filed at 4/22/2025 1243  Gross per 24 hour   Intake 771 ml   Output 1550 ml   Net -779 ml        Physical Exam  Constitutional:       Appearance: Normal appearance.   HENT:      Head: Normocephalic and atraumatic.   Cardiovascular:      Rate and Rhythm: Normal rate and regular rhythm.      Pulses: Normal pulses.      Heart sounds: Normal heart sounds.   Pulmonary:      Breath sounds: Rales present.   Abdominal:      General: Bowel sounds are normal.      Palpations: Abdomen is soft.   Musculoskeletal:      Cervical back: Normal range of motion.      Right lower leg: No edema.      Left lower leg: No edema.   Skin:     General: Skin is warm.      Capillary Refill: Capillary refill takes less than 2 seconds.   Neurological:      General: No focal deficit present.      Mental Status: He is alert and oriented to person, place, and time. Mental status is at baseline.         Lines/Drains:        Telemetry:  Telemetry Orders (From admission, onward)               24 Hour Telemetry Monitoring  Continuous x 24 Hours (Telem)        Expiring   Question:  Reason for 24 Hour Telemetry  Answer:  Decompensated CHF- and any one of the following: continuous diuretic infusion or total diuretic dose >200 mg daily, associated electrolyte derangement (I.e. K < 3.0), inotropic drip (continuous infusion), hx of ventricular arrhythmia, or new EF < 35%                     Telemetry Reviewed:  reviewd  Indication for Continued Telemetry Use: Arrthymias requiring medical therapy               Lab Results: I have reviewed the following results:   Results from last 7 days   Lab Units 04/22/25  0510   WBC Thousand/uL 5.07   HEMOGLOBIN g/dL 12.8   HEMATOCRIT % 40.4   PLATELETS Thousands/uL 136*   SEGS PCT % 57   LYMPHO PCT % 24   MONO PCT % 14*   EOS PCT % 4     Results from last 7 days   Lab Units 04/22/25  0510 04/21/25  0607 04/20/25  0446   SODIUM mmol/L 140   < > 143   POTASSIUM mmol/L 3.9   < > 3.9   CHLORIDE mmol/L 100   < > 106   CO2 mmol/L 32   < > 29   BUN mg/dL 36*   < > 30*   CREATININE mg/dL 1.35*   < > 1.25   ANION GAP  mmol/L 8   < > 8   CALCIUM mg/dL 8.6   < > 8.7   ALBUMIN g/dL  --   --  3.7   TOTAL BILIRUBIN mg/dL  --   --  1.13*   ALK PHOS U/L  --   --  46   ALT U/L  --   --  14   AST U/L  --   --  23   GLUCOSE RANDOM mg/dL 87   < > 87    < > = values in this interval not displayed.     Results from last 7 days   Lab Units 04/22/25  0510   INR  2.12*                   Recent Cultures (last 7 days):               Last 24 Hours Medication List:     Current Facility-Administered Medications:     albuterol (PROVENTIL HFA,VENTOLIN HFA) inhaler 2 puff, Q6H PRN    atorvastatin (LIPITOR) tablet 10 mg, Daily With Dinner    ferrous sulfate tablet 325 mg, Daily With Breakfast    [START ON 4/23/2025] furosemide (LASIX) tablet 20 mg, Daily    [Held by provider] lisinopril (ZESTRIL) tablet 5 mg, Daily    metoprolol tartrate (LOPRESSOR) tablet 25 mg, Q12H ETELVINA    spironolactone (ALDACTONE) tablet 25 mg, Daily    tetrahydrozoline (VISINE) 0.05 % ophthalmic solution 1 drop, QAM    warfarin (COUMADIN) tablet 2.5 mg, Every Sunday    warfarin (COUMADIN) tablet 5 mg, Once per day on Monday Tuesday Wednesday Thursday Friday Saturday    Administrative Statements   Today, Patient Was Seen By: Esme Soto MD      **Please Note: This note may have been constructed using a voice recognition system.**

## 2025-04-23 ENCOUNTER — DOCUMENTATION (OUTPATIENT)
Dept: ADMINISTRATIVE | Facility: OTHER | Age: OVER 89
End: 2025-04-23

## 2025-04-23 VITALS
TEMPERATURE: 97.8 F | HEART RATE: 79 BPM | DIASTOLIC BLOOD PRESSURE: 56 MMHG | WEIGHT: 172.18 LBS | BODY MASS INDEX: 24.1 KG/M2 | OXYGEN SATURATION: 95 % | SYSTOLIC BLOOD PRESSURE: 117 MMHG | HEIGHT: 71 IN | RESPIRATION RATE: 17 BRPM

## 2025-04-23 PROBLEM — R79.89 ELEVATED TROPONIN: Status: RESOLVED | Noted: 2025-02-27 | Resolved: 2025-04-23

## 2025-04-23 LAB
ANION GAP SERPL CALCULATED.3IONS-SCNC: 7 MMOL/L (ref 4–13)
BASOPHILS # BLD AUTO: 0.03 THOUSANDS/ÂΜL (ref 0–0.1)
BASOPHILS NFR BLD AUTO: 1 % (ref 0–1)
BUN SERPL-MCNC: 35 MG/DL (ref 5–25)
CALCIUM SERPL-MCNC: 8.6 MG/DL (ref 8.4–10.2)
CHLORIDE SERPL-SCNC: 101 MMOL/L (ref 96–108)
CO2 SERPL-SCNC: 32 MMOL/L (ref 21–32)
CREAT SERPL-MCNC: 1.25 MG/DL (ref 0.6–1.3)
EOSINOPHIL # BLD AUTO: 0.25 THOUSAND/ÂΜL (ref 0–0.61)
EOSINOPHIL NFR BLD AUTO: 4 % (ref 0–6)
ERYTHROCYTE [DISTWIDTH] IN BLOOD BY AUTOMATED COUNT: 14.7 % (ref 11.6–15.1)
GFR SERPL CREATININE-BSD FRML MDRD: 50 ML/MIN/1.73SQ M
GLUCOSE SERPL-MCNC: 87 MG/DL (ref 65–140)
HCT VFR BLD AUTO: 39.9 % (ref 36.5–49.3)
HGB BLD-MCNC: 12.7 G/DL (ref 12–17)
IMM GRANULOCYTES # BLD AUTO: 0.03 THOUSAND/UL (ref 0–0.2)
IMM GRANULOCYTES NFR BLD AUTO: 1 % (ref 0–2)
INR PPP: 2.09 (ref 0.85–1.19)
LYMPHOCYTES # BLD AUTO: 1.08 THOUSANDS/ÂΜL (ref 0.6–4.47)
LYMPHOCYTES NFR BLD AUTO: 18 % (ref 14–44)
MCH RBC QN AUTO: 29.1 PG (ref 26.8–34.3)
MCHC RBC AUTO-ENTMCNC: 31.8 G/DL (ref 31.4–37.4)
MCV RBC AUTO: 92 FL (ref 82–98)
MONOCYTES # BLD AUTO: 0.8 THOUSAND/ÂΜL (ref 0.17–1.22)
MONOCYTES NFR BLD AUTO: 14 % (ref 4–12)
NEUTROPHILS # BLD AUTO: 3.74 THOUSANDS/ÂΜL (ref 1.85–7.62)
NEUTS SEG NFR BLD AUTO: 62 % (ref 43–75)
NRBC BLD AUTO-RTO: 0 /100 WBCS
PLATELET # BLD AUTO: 133 THOUSANDS/UL (ref 149–390)
PMV BLD AUTO: 12.2 FL (ref 8.9–12.7)
POTASSIUM SERPL-SCNC: 3.8 MMOL/L (ref 3.5–5.3)
PROTHROMBIN TIME: 23.8 SECONDS (ref 12.3–15)
RBC # BLD AUTO: 4.36 MILLION/UL (ref 3.88–5.62)
SODIUM SERPL-SCNC: 140 MMOL/L (ref 135–147)
WBC # BLD AUTO: 5.93 THOUSAND/UL (ref 4.31–10.16)

## 2025-04-23 PROCEDURE — 85610 PROTHROMBIN TIME: CPT | Performed by: FAMILY MEDICINE

## 2025-04-23 PROCEDURE — 80048 BASIC METABOLIC PNL TOTAL CA: CPT | Performed by: FAMILY MEDICINE

## 2025-04-23 PROCEDURE — 99239 HOSP IP/OBS DSCHRG MGMT >30: CPT | Performed by: FAMILY MEDICINE

## 2025-04-23 PROCEDURE — 85025 COMPLETE CBC W/AUTO DIFF WBC: CPT | Performed by: FAMILY MEDICINE

## 2025-04-23 RX ORDER — SPIRONOLACTONE 25 MG/1
25 TABLET ORAL DAILY
Qty: 30 TABLET | Refills: 0 | Status: SHIPPED | OUTPATIENT
Start: 2025-04-24

## 2025-04-23 RX ADMIN — FERROUS SULFATE TAB 325 MG (65 MG ELEMENTAL FE) 325 MG: 325 (65 FE) TAB at 08:00

## 2025-04-23 RX ADMIN — METOPROLOL TARTRATE 25 MG: 25 TABLET, FILM COATED ORAL at 10:05

## 2025-04-23 RX ADMIN — TETRAHYDROZOLINE HCL 1 DROP: 0.05 SOLUTION/ DROPS OPHTHALMIC at 10:06

## 2025-04-23 RX ADMIN — FUROSEMIDE 20 MG: 20 TABLET ORAL at 10:05

## 2025-04-23 RX ADMIN — SPIRONOLACTONE 25 MG: 25 TABLET, FILM COATED ORAL at 10:05

## 2025-04-23 NOTE — ASSESSMENT & PLAN NOTE
Lab Results   Component Value Date    EGFR 50 04/23/2025    EGFR 46 04/22/2025    EGFR 54 04/21/2025    CREATININE 1.25 04/23/2025    CREATININE 1.35 (H) 04/22/2025    CREATININE 1.18 04/21/2025   Patient with past medical history of CKD  Patient creatinine on admission 1.32  Probably in setting of CHF with cardiorenal etiology  Avoid nephrotoxic agents  Continue IV diuresis  Continue to monitor creatinine, I/O  Creatinine fluctuated but stable

## 2025-04-23 NOTE — PROGRESS NOTES
04/23/25 9:11 AM    Annual Wellness Visit outreach is not required, an AWV was completed at the PCP office.    Thank you.  Blanche Orlando MA  [facility]

## 2025-04-23 NOTE — DISCHARGE SUMMARY
"Discharge Summary - Hospitalist   Name: Larry Jenkins 89 y.o. male I MRN: [id number]  Unit/Bed#: -01 I Date of Admission: 4/19/2025   Date of Service: 4/23/2025 I Hospital Day: 3     Assessment & Plan  Acute exacerbation of CHF (congestive heart failure) (HCC)  Wt Readings from Last 3 Encounters:   04/21/25 78.1 kg (172 lb 2.9 oz)   04/15/25 78.5 kg (173 lb)   03/07/25 79.4 kg (175 lb)   Patient presented with shortness of breath for several weeks, admitted in February due to similar symptoms  Troponins 202> 215, BNP 3092  EKG with paced rhythm  Echo in 2018 showed left ventricular ejection fraction of 40 to 45%, repeat ECHO  Patient is on home 20 mg Lasix daily  Plan  Continue metoprolol  Hold lisinopril due to elevated creatinine  Continue telemetry monitor  Strict I's and O's  Fluid and salt restrictions   Echocardiogram from 4/21:\"The left ventricular ejection fraction is 25-30%. Systolic function is severely reduced. There is severe global hypokinesis with regional variation \"  Cardiology involved, transitioned to oral Lasix 20 mg daily, initiated spironolactone 25 mg. Jardiance is cost prohibitive  Patient medically appropriate for disposition  History of mitral valve replacement  Patient with history of mitral valve replacement 20 years ago  Patient is on warfarin 5 mg daily except Sunday 2.5  INR goal 2.5-3.5  Patient INR is 2.12 today, downtrending, he reports been eating broccoli daily while admitted-advised to avoid he also stated he might missed a dose prior to admission  On the day of discharge INR is 2.09-discussed with the patient the need to keep INR at therapeutic level, advised bridging with the Lovenox or heparin, but patient refused both options and wants to follow-up with his cardiology.  Patient's son called the outpatient cardiology office, and scheduled appointment for tomorrow for INR check  Hypertension  on lisinopril and metoprolol  Continue metoprolol  Hold lisinopril in setting of " elevated creatinine level, continue to hold on discharge-May resume outpatient if creatinine remains stable  CKD (chronic kidney disease)  Lab Results   Component Value Date    EGFR 50 04/23/2025    EGFR 46 04/22/2025    EGFR 54 04/21/2025    CREATININE 1.25 04/23/2025    CREATININE 1.35 (H) 04/22/2025    CREATININE 1.18 04/21/2025   Patient with past medical history of CKD  Patient creatinine on admission 1.32  Probably in setting of CHF with cardiorenal etiology  Avoid nephrotoxic agents  Continue IV diuresis  Continue to monitor creatinine, I/O  Creatinine fluctuated but stable  Pacemaker  Patient has history of A-fib with pacemaker placed 20 years ago  Coronary artery disease  Patient with past med history of coronary disease status post CABG 20 years ago  Patient is on metoprolol and statin  Continue current regimen     Medical Problems       Resolved Problems  Date Reviewed: 3/24/2025          Resolved    Elevated troponin 4/23/2025     Resolved by  Esme Soto MD        Discharging Physician / Practitioner: Esme Soto MD  PCP: Andrey Bateman MD  Admission Date:   Admission Orders (From admission, onward)       Ordered        04/20/25 1435  INPATIENT ADMISSION  Once            04/19/25 1459  Place in Observation  Once                          Discharge Date: 04/23/25    Consultations During Hospital Stay:  cardiology    Procedures Performed:   echo    Significant Findings / Test Results:   Laboratory and Diagnostics  Results from last 7 days   Lab Units 04/23/25 0523 04/22/25  0510 04/21/25  0607 04/20/25  0446 04/19/25  1255   WBC Thousand/uL 5.93 5.07 4.92 5.05 5.76   HEMOGLOBIN g/dL 12.7 12.8 12.9 12.2 13.0   HEMATOCRIT % 39.9 40.4 41.1 38.8 42.8   PLATELETS Thousands/uL 133* 136* 110* 129* 148*   SEGS PCT % 62 57 64  --  62   MONO PCT % 14* 14* 13*  --  12   EOS PCT % 4 4 4  --  2     Results from last 7 days   Lab Units 04/23/25  0523 04/22/25  0510 04/21/25  0607 04/20/25  0446 04/19/25  1255    SODIUM mmol/L 140 140 140 143 142   POTASSIUM mmol/L 3.8 3.9 3.2* 3.9 4.5   CHLORIDE mmol/L 101 100 100 106 108   CO2 mmol/L 32 32 33* 29 30   ANION GAP mmol/L 7 8 7 8 4   BUN mg/dL 35* 36* 33* 30* 29*   CREATININE mg/dL 1.25 1.35* 1.18 1.25 1.32*   CALCIUM mg/dL 8.6 8.6 8.6 8.7 9.2   GLUCOSE RANDOM mg/dL 87 87 73 87 106   ALT U/L  --   --   --  14 16   AST U/L  --   --   --  23 26   ALK PHOS U/L  --   --   --  46 52   ALBUMIN g/dL  --   --   --  3.7 4.2   TOTAL BILIRUBIN mg/dL  --   --   --  1.13* 1.18*     Results from last 7 days   Lab Units 04/21/25  0607 04/20/25  0446   MAGNESIUM mg/dL 2.0 2.1   PHOSPHORUS mg/dL  --  3.7      Results from last 7 days   Lab Units 04/23/25  0523 04/22/25  0510 04/21/25  0607 04/20/25  0446 04/19/25  1351   INR  2.09* 2.12* 2.25* 3.48* 3.79*   PTT seconds  --   --   --   --  43*          Lab Results   Component Value Date    HSTNID2 13 04/19/2025    HSTNID4 5 04/19/2025     Lab Results   Component Value Date    NTBNP 6,728 (H) 12/25/2022    BNP 3,092 (H) 04/19/2025    BNP 2,776 (H) 02/27/2025     Lab Results   Component Value Date    LACTICACID 1.3 12/25/2022     .  Lab Results   Component Value Date    FERRITIN 186 12/28/2022    FERRITIN 19 11/15/2018    .5 (H) 12/25/2022                 Lab Results   Component Value Date    PROCALCITONI 0.24 12/25/2022         Lab Results   Component Value Date    HGB 12.7 04/23/2025    HGB 12.8 04/22/2025    HGB 12.9 04/21/2025    HGB 12.2 04/20/2025    HGB 13.0 04/19/2025    CONCFE 33 12/28/2022    IRON 83 12/28/2022    FERRITIN 186 12/28/2022    TIBC 255 12/28/2022       ABG:       Micro    Imaging and other studies: Results Review Statement: I reviewed radiology reports from this admission including: Echocardiogram.  XR chest 2 views  Result Date: 4/19/2025  Impression: No focal consolidation, pleural effusion, or pneumothorax. Resident: Bassem Page I, the attending radiologist, have reviewed the images and agree with the final  report above. Workstation performed: [id number]     Results for orders placed during the hospital encounter of 04/19/25    Echo complete w/ contrast if indicated    Interpretation Summary    Left Ventricle: Left ventricular cavity size is normal. Wall thickness is moderately increased. The left ventricular ejection fraction is 25-30%. Systolic function is severely reduced. There is severe global hypokinesis with regional variation.    Right Ventricle: Right ventricular cavity size is dilated. Systolic function is moderately reduced.    Left Atrium: The atrium is severely dilated.    Right Atrium: The atrium is dilated.    Aortic Valve: There is mild regurgitation. There is aortic valve sclerosis.    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 Valve leaflet motion was not well visualized. The gradient recorded across the prosthetic mitral valve is within the expected range.    Aorta: The aortic root is mildly dilated. The aortic root is 4.30 cm. The ascending aorta is 3.5     Test Results Pending at Discharge (will require follow up):   none     Outpatient Tests Requested:  Monitor INR    Complications:  none    Reason for Admission: Dyspnea    Hospital Course:   Larry Jenkins is a 89 y.o. male patient who originally presented to the hospital on 4/19/2025 due to dyspnea.  Presentation of workup revealing for CHF exacerbation.  Cardiology was involved with adjusting diuresis.  Patient had an echo done during hospitalization revealing ejection fraction is 25-30%, systolic function- severely reduced and severe global hypokinesis with regional variation.  Cardiology adjusted GDMT-upon discharge continue Lasix 20 mg daily, metoprolol 25 mg twice daily, spironolactone 25 mg daily as well as lisinopril 5 mg (held during admission and on discharge due to elevated kidney function, consider resuming when kidney function stabilized).  Initially offered Jardiance, but that was cost prohibitive  Of note,  "patient was on chronic Coumadin for mechanical valve replacement with home regimen of 2.5 mg on Sunday and 5 mg on the rest of the days.  For the last 2 days of admission, INR started to downtrend to 2/09. Patient advised bridging with Lovenox or heparin until INR is therapeutic at 2.5-3.5, but he refused both options and preferred to discuss with his outpatient cardiology.  Offer at least 1 dose of Lovenox prior to discharge which he also refused. He scheduled appointment with cardiology; office the day after discharge to follow-up on INR  Patient was medically appropriate for discharge    Please see above list of diagnoses and related plan for additional information.     Condition at Discharge: good    Discharge Day Visit / Exam:   Subjective: Patient seen and examined.  He reports feeling well, denies any complaints and eager to go home  Vitals: Blood Pressure: 117/56 (04/23/25 1007)  Pulse: 79 (04/23/25 1007)  Temperature: 97.8 °F (36.6 °C) (04/23/25 0720)  Temp Source: Oral (04/23/25 0720)  Respirations: 17 (04/23/25 0720)  Height: 5' 11\" (180.3 cm) (04/21/25 1005)  Weight - Scale: 78.1 kg (172 lb 2.9 oz) (04/21/25 1005)  SpO2: 95 % (04/23/25 1007)  Physical Exam  Constitutional:       Appearance: Normal appearance.   HENT:      Head: Normocephalic and atraumatic.   Cardiovascular:      Rate and Rhythm: Normal rate and regular rhythm.      Pulses: Normal pulses.      Heart sounds: Normal heart sounds.   Pulmonary:      Breath sounds: No rales.   Abdominal:      General: Bowel sounds are normal.      Palpations: Abdomen is soft.   Musculoskeletal:      Cervical back: Normal range of motion.      Right lower leg: No edema.      Left lower leg: No edema.   Skin:     General: Skin is warm.      Capillary Refill: Capillary refill takes less than 2 seconds.   Neurological:      General: No focal deficit present.      Mental Status: He is alert and oriented to person, place, and time. Mental status is at baseline. "          Discussion with Family: Updated  (son) at bedside.    Discharge instructions/Information to patient and family:   See after visit summary for information provided to patient and family.      Provisions for Follow-Up Care:  See after visit summary for information related to follow-up care and any pertinent home health orders.      Mobility at time of Discharge:   Basic Mobility Inpatient Raw Score: 20  JH-HLM Goal: 6: Walk 10 steps or more  JH-HLM Achieved: 6: Walk 10 steps or more  HLM Goal achieved. Continue to encourage appropriate mobility.     Disposition:   Home    Planned Readmission: no    Discharge Medications:  See after visit summary for reconciled discharge medications provided to patient and/or family.      Administrative Statements   Discharge Statement:  I have spent a total time of 65 minutes in caring for this patient on the day of the visit/encounter. >30 minutes of time was spent on: Risks and benefits of tx options, Patient and family education, Impressions, Counseling / Coordination of care, and Communicating with other healthcare professionals .    **Please Note: This note may have been constructed using a voice recognition system**

## 2025-04-23 NOTE — ASSESSMENT & PLAN NOTE
on lisinopril and metoprolol  Continue metoprolol  Hold lisinopril in setting of elevated creatinine level, continue to hold on discharge-May resume outpatient if creatinine remains stable

## 2025-04-23 NOTE — PLAN OF CARE
Problem: SAFETY ADULT  Goal: Patient will remain free of falls  Description: INTERVENTIONS:- Educate patient/family on patient safety including physical limitations- Instruct patient to call for assistance with activity - Consult OT/PT to assist with strengthening/mobility - Keep Call bell within reach- Keep bed low and locked with side rails adjusted as appropriate- Keep care items and personal belongings within reach- Initiate and maintain comfort rounds- Make Fall Risk Sign visible to staff- Offer Toileting every 2 Hours, in advance of need- Initiate/Maintain 2 alarms- Obtain necessary fall risk management equipment: yellow bracelet and yellow socks- Apply yellow socks and bracelet for high fall risk patients- Consider moving patient to room near nurses station  INTERVENTIONS:- Educate patient/family on patient safety including physical limitations- Instruct patient to call for assistance with activity - Consult OT/PT to assist with strengthening/mobility - Keep Call bell within reach- Keep bed low and locked with side rails adjusted as appropriate- Keep care items and personal belongings within reach- Initiate and maintain comfort rounds- Make Fall Risk Sign visible to staff- Offer Toileting every 2 Hours, in advance of need- Initiate/Maintain 2 alarms- Obtain necessary fall risk management equipment: yellow bracelet and yellow socks- Apply yellow socks and bracelet for high fall risk patients- Consider moving patient to room near nurses station  Outcome: Progressing     Problem: SAFETY ADULT  Goal: Maintain or return to baseline ADL function  Description: INTERVENTIONS:-  Assess patient's ability to carry out ADLs; assess patient's baseline for ADL function and identify physical deficits which impact ability to perform ADLs (bathing, care of mouth/teeth, toileting, grooming, dressing, etc.)- Assess/evaluate cause of self-care deficits - Assess range of motion- Assess patient's mobility; develop plan if  impaired- Assess patient's need for assistive devices and provide as appropriate- Encourage maximum independence but intervene and supervise when necessary- Involve family in performance of ADLs- Assess for home care needs following discharge - Consider OT consult to assist with ADL evaluation and planning for discharge- Provide patient education as appropriate  Outcome: Progressing

## 2025-04-23 NOTE — CASE MANAGEMENT
Case Management Discharge Planning Note    Patient name Larry Jenkins  Location /-01 MRN [id number]  : 1935 Date 2025       Current Admission Date: 2025  Current Admission Diagnosis:Acute exacerbation of CHF (congestive heart failure) (HCC)   Patient Active Problem List    Diagnosis Date Noted Date Diagnosed    Acute exacerbation of CHF (congestive heart failure) (HCC) 2025     Pacemaker 2025     Hematuria 2025     Chronic systolic heart failure (HCC) 2025     SOB (shortness of breath) 2025     Elevated troponin 2025     History of mitral valve replacement 2025     CKD (chronic kidney disease) 2024     Advanced care planning/counseling discussion 2023     COVID-19 2022     Elevated CK 2022     Chronic a-fib (HCC) 2022     Depression, recurrent (HCC) 2022     Stage 3a chronic kidney disease (HCC) 2022     Primary osteoarthritis of both hips 2021     Trochanteric bursitis of both hips 2021     Paroxysmal atrial fibrillation (HCC) 2018     GI bleed 2018     Coagulopathy ([facility]) 2018     Hyperglycemia 2016     Hypocalcemia 2016     Herpes zoster 2015     Insomnia 2013     Anxiety 2012     Arthritis 2012     Anemia of chronic disease 2012     Coronary artery disease 2012     Mixed hyperlipidemia 2012     Hypertension 2012       LOS (days): 3  Geometric Mean LOS (GMLOS) (days): 3.9  Days to GMLOS:1.1     OBJECTIVE:  Risk of Unplanned Readmission Score: 15.72         Current admission status: Inpatient   Preferred Pharmacy:   [facility] - [address] [address]  Phone: [phone] Fax: [phone]    [id number] - [place] - [address] [facility]  [address] [facility]  [place] [address]  Phone: [phone] Fax:  [phone]    Primary Care Provider: Andrey Bateman MD    Primary Insurance: MEDICARE  Secondary Insurance: [facility]    DISCHARGE DETAILS:         IMM Given (Date):: 04/23/25  IMM Given to:: Patient  IMM reviewed with patient, patient agrees with discharge determination.

## 2025-04-23 NOTE — ASSESSMENT & PLAN NOTE
"Wt Readings from Last 3 Encounters:   04/21/25 78.1 kg (172 lb 2.9 oz)   04/15/25 78.5 kg (173 lb)   03/07/25 79.4 kg (175 lb)   Patient presented with shortness of breath for several weeks, admitted in February due to similar symptoms  Troponins 202> 215, BNP 3092  EKG with paced rhythm  Echo in 2018 showed left ventricular ejection fraction of 40 to 45%, repeat ECHO  Patient is on home 20 mg Lasix daily  Plan  Continue metoprolol  Hold lisinopril due to elevated creatinine  Continue telemetry monitor  Strict I's and O's  Fluid and salt restrictions   Echocardiogram from 4/21:\"The left ventricular ejection fraction is 25-30%. Systolic function is severely reduced. There is severe global hypokinesis with regional variation \"  Cardiology involved, transitioned to oral Lasix 20 mg daily, initiated spironolactone 25 mg. Jardiance is cost prohibitive  Patient medically appropriate for disposition  "

## 2025-04-23 NOTE — PLAN OF CARE
Problem: DISCHARGE PLANNING  Goal: Discharge to home or other facility with appropriate resources  Description: INTERVENTIONS:- Identify barriers to discharge w/patient and caregiver- Arrange for needed discharge resources and transportation as appropriate- Identify discharge learning needs (meds, wound care, etc.)- Arrange for interpretive services to assist at discharge as needed- Refer to Case Management Department for coordinating discharge planning if the patient needs post-hospital services based on physician/advanced practitioner order or complex needs related to functional status, cognitive ability, or social support system  Outcome: Progressing     Problem: Knowledge Deficit  Goal: Patient/family/caregiver demonstrates understanding of disease process, treatment plan, medications, and discharge instructions  Description: Complete learning assessment and assess knowledge base.Interventions:- Provide teaching at level of understanding- Provide teaching via preferred learning methods  Outcome: Progressing     Problem: Decreased Cardiac Output  Goal: Cardiac output adequate for individual needs  Description: INTERVENTIONS: Monitor for signs and symptoms of decreased cardiac output - Monitor for dyspnea with exertion and at rest- Monitor for orthopnea- Monitor for signs of tachycardia. Place patient on telemetry monitoring.- Assess patient for jugular vein distention- Assess patient for lower extremity edema and poor peripheral perfusion - Auscultate lung sound for Fine bibasilar crackles - Monitor for cardiac arrythmias - Administer beta blockers, antiarrhythmic, and blood pressure medications as ordered  Outcome: Progressing

## 2025-04-23 NOTE — ASSESSMENT & PLAN NOTE
Patient with history of mitral valve replacement 20 years ago  Patient is on warfarin 5 mg daily except Sunday 2.5  INR goal 2.5-3.5  Patient INR is 2.12 today, downtrending, he reports been eating broccoli daily while admitted-advised to avoid he also stated he might missed a dose prior to admission  On the day of discharge INR is 2.09-discussed with the patient the need to keep INR at therapeutic level, advised bridging with the Lovenox or heparin, but patient refused both options and wants to follow-up with his cardiology.  Patient's son called the outpatient cardiology office, and scheduled appointment for tomorrow for INR check

## 2025-04-24 ENCOUNTER — PATIENT OUTREACH (OUTPATIENT)
Dept: CASE MANAGEMENT | Facility: OTHER | Age: OVER 89
End: 2025-04-24

## 2025-04-24 ENCOUNTER — TELEPHONE (OUTPATIENT)
Dept: CARDIOLOGY CLINIC | Facility: CLINIC | Age: OVER 89
End: 2025-04-24

## 2025-04-24 ENCOUNTER — TRANSITIONAL CARE MANAGEMENT (OUTPATIENT)
Dept: FAMILY MEDICINE CLINIC | Facility: CLINIC | Age: OVER 89
End: 2025-04-24

## 2025-04-25 ENCOUNTER — PATIENT OUTREACH (OUTPATIENT)
Dept: CASE MANAGEMENT | Facility: OTHER | Age: OVER 89
End: 2025-04-25

## 2025-04-25 NOTE — PROGRESS NOTES
Pt admitted to [facility] for shortness of breath r/t HF. Hx CABG1, HF, mitral valve Replacement. Coumadin pt. States that Ranexa caused constipation and is on hold as well as Imdur and lisinopril.PCP f/u is 5/1, Cardiologist is 5/19 Dr Ayala at [place].he will have his Protime checked next Thursday.    Following low sodium diet: reads labels and yes   Following fluid restriction: no, not enough fluid intake.    Hospital discharge weight:172 lbs     Weighing daily: yes             Weight log:no  1st home weight: unknown        Weight today:160 lbs  Monitoring symptoms: yes  Any current symptoms: no current symptoms   When to call provider: yes, aware.  Medications reviewed: yes   Knows name of diuretic:    Escalation plan: aware  HF education reviewed/reinforced including low sodium diet, fluid restriction, activity, symptoms of decompensation and when and who to call.   Cardiology follow up appointment:   PCP follow up appointment:5/1  Transportation:son drives  Home health care agency: n/a  Start of Care Date:n/a    Been reading labels for years. Wife was diabetic, Declines CMOC visit. Pt reports that he has no thoughts of self harm, but he has lost interest in doing things. He states that he isn't a social person and prefers to be alone, but this has been this way since his wife passed away in 2021. Pt admits to 'having nothing to live for anymore' since he and his wife were  63 years. Discussed that he did see palliative care last year and it did not interest him. 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 Larry is agreeable to weekly outreach for HF.

## 2025-04-28 NOTE — PROGRESS NOTES
"  PATIENT:  Larry Jenkins  1935    ASSESSMENT:  1. Onychomycosis        2. Peripheral vascular disease, unspecified (HCC)           No orders of the defined types were placed in this encounter.     PLAN:  The patient was educated in proper foot wear.    Also discussed daily foot assessment and proper foot care.    The patient will follow up in 9-12 weeks for foot exam and care.      Procedures: 40239  All mycotic toenails were reduced and debrided in length, width, and girth using a nail nipper and dremel.  All non-dystrphic nails also trimmed.    All hyperkeratotic skin lesion(s) if present were sharply pared with a scalpel / forcep with no bleeding or evidence of ulceration.    Patient tolerated procedure(s) well without complications.    Procedures     HPI:  Larry Jenkins is a 89 y.o.year old male seen for initial at risk foot exam and care.  The patient complained of elongated thick painful toenails.  The patient has class findings with peripheral vascular disease.  The patient denied any acute pedal disorder, redness, acute swelling, or recent injury.      The following portions of the patient's history were reviewed and updated as appropriate: allergies, current medications, past family history, past medical history, past social history, past surgical history, and problem list.    REVIEW OF SYSTEMS:  GENERAL: No fever or chills  HEART: No chest pain, or palpitation  RESPIRATORY:  No acute SOB or cough  GI: No Nausea, vomit or diarrhea  NEUROLOGIC: No syncope or acute weakness    PHYSICAL EXAM:    Ht 5' 11\" (1.803 m) Comment: stated  Wt 78.5 kg (173 lb)   BMI 24.13 kg/m²     VASCULAR EXAM  Posterior tibial artery  absent bilateral.    Dorsalis pedis artery absent bilateral.  The patient has class findings with skin atrophy, lack of digital hair, and nail dystrophy.    There is +1 lower extremity edema bilaterally.      NEUROLOGIC EXAM  Sensation is intact to light touch.  Sensation is intact to 10gm " monofilament.    No focal neurologic deficit.          DERMATOLOGIC EXAM:   No ulcer or cellulitis noted.  Texture, Tone and Turgor are diminished with moderate atrophic changes noted.    The patient has dystrophic/hypertrophic toenails with yellow/white discoloration, onycholysis, and subungal debris.   Fungal odor and brittle nature noted.  Pain with palpation.  Periungual erythema noted.  Right foot nails dystrophic x5 with 0.8 cm ave thickness (1 through 5)  Left foot nails dystrophic x4 with 0.7 cm ave thickness (1, 2, 4 and 5)    Patient has hyperkeratotic lesions noted:   Right foot located at none.  Left foot located at none.  No notable suspicious skin lesions.      MUSCULOSKELETAL EXAM:   No acute joint pain, edema, or redness.  No acute musculoskeletal problem.    No gross pedal deformities noted.    Risk Category/Class Findings:   Q8(B1, B3)  A1)  Has the patient had a previous amputation of the foot or integral skeletal portion thereof? No  B1)  Does the patient have absent posterior tibial pulse? Yes  B3)  Does the patient have absent dorsalis pedis? Yes  B2)  Does the patient have three of the following? Yes           1.  Hair growth (increased or decreased), 2.  Nail changes (thickening), and 3.  Pigmentary changes (discoloring)  C)  Does the patient have two of the following and one above? No

## 2025-05-01 ENCOUNTER — TELEMEDICINE (OUTPATIENT)
Dept: FAMILY MEDICINE CLINIC | Facility: CLINIC | Age: OVER 89
End: 2025-05-01
Payer: MEDICARE

## 2025-05-01 DIAGNOSIS — R79.89 ELEVATED SERUM CREATININE: ICD-10-CM

## 2025-05-01 DIAGNOSIS — I10 PRIMARY HYPERTENSION: ICD-10-CM

## 2025-05-01 DIAGNOSIS — D63.8 ANEMIA OF CHRONIC DISEASE: ICD-10-CM

## 2025-05-01 DIAGNOSIS — F33.9 DEPRESSION, RECURRENT (HCC): ICD-10-CM

## 2025-05-01 DIAGNOSIS — Z78.9 TRANSITION OF CARE: Primary | ICD-10-CM

## 2025-05-01 DIAGNOSIS — I50.22 CHRONIC SYSTOLIC HEART FAILURE (HCC): ICD-10-CM

## 2025-05-01 DIAGNOSIS — I48.0 PAROXYSMAL ATRIAL FIBRILLATION (HCC): ICD-10-CM

## 2025-05-01 DIAGNOSIS — I25.810 CORONARY ARTERY DISEASE INVOLVING OTHER CORONARY ARTERY BYPASS GRAFT, UNSPECIFIED WHETHER ANGINA PRESENT: ICD-10-CM

## 2025-05-01 PROCEDURE — 99495 TRANSJ CARE MGMT MOD F2F 14D: CPT

## 2025-05-01 NOTE — ASSESSMENT & PLAN NOTE
Anemia not present last CBC but + thrombocytopenia. Trend CBC.  Component      Latest Ref Rng 4/23/2025   WBC      4.31 - 10.16 Thousand/uL 5.93    RBC      3.88 - 5.62 Million/uL 4.36    Hemoglobin      12.0 - 17.0 g/dL 12.7    Hematocrit      36.5 - 49.3 % 39.9    MCV      82 - 98 fL 92    MCH      26.8 - 34.3 pg 29.1    MCHC      31.4 - 37.4 g/dL 31.8    RDW      11.6 - 15.1 % 14.7    MPV      8.9 - 12.7 fL 12.2    Platelet Count      149 - 390 Thousands/uL 133 (L)    nRBC      /100 WBCs 0    Segmented %      43 - 75 % 62    Immature Grans %      0 - 2 % 1    Lymphocytes %      14 - 44 % 18    Monocytes %      4 - 12 % 14 (H)    Eosinophils %      0 - 6 % 4    Basophils %      0 - 1 % 1    Absolute Neutrophils      1.85 - 7.62 Thousands/µL 3.74    Absolute Immature Grans      0.00 - 0.20 Thousand/uL 0.03    Absolute Lymphocytes      0.60 - 4.47 Thousands/µL 1.08    Absolute Monocytes      0.17 - 1.22 Thousand/µL 0.80    Absolute Eosinophils      0.00 - 0.61 Thousand/µL 0.25    Absolute Basophils      0.00 - 0.10 Thousands/µL 0.03       Legend:  (L) Low  (H) High        Orders:    CBC and differential; Future

## 2025-05-01 NOTE — ASSESSMENT & PLAN NOTE
Follows with [facility] cards. Patient with pacer--- pacemaker generator change 6/27/23. Continue metoprolol and warfarin. Follows with [facility] coumadin clinic. Son reports most recent INR 4/24/25 was 2.3. Is scheduled to follow up for lab draw today.   Orders:    Ambulatory Referral to Palliative Care; Future

## 2025-05-01 NOTE — ASSESSMENT & PLAN NOTE
Wt Readings from Last 3 Encounters:   04/21/25 78.1 kg (172 lb 2.9 oz)   04/15/25 78.5 kg (173 lb)   03/07/25 79.4 kg (175 lb)     Patient hospitalized from 4/19-4/23 after presenting to the ER for SOB for several weeks . Patient with elevated troponins and BNP.       4/21/25 echo---    Left Ventricle: Left ventricular cavity size is normal. Wall thickness is moderately increased. The left ventricular ejection fraction is 25-30%. Systolic function is severely reduced. There is severe global hypokinesis with regional variation.    Right Ventricle: Right ventricular cavity size is dilated. Systolic function is moderately reduced.    Left Atrium: The atrium is severely dilated.    Right Atrium: The atrium is dilated.    Aortic Valve: There is mild regurgitation. There is aortic valve sclerosis.    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 Valve leaflet motion was not well visualized. The gradient recorded across the prosthetic mitral valve is within the expected range.    Aorta: The aortic root is mildly dilated. The aortic root is 4.30 cm. The ascending aorta is 3.5 cm.        Patient was encouraged to monitor DWTs. Reports compliance and weights have been stable at 161 lb at home. Imdur, lisinopril, and ranexa remain on hold. Patient was started on spironolactone and has been compliant since discharge. Patient follows with [facility] cards--- next appt is with NP on 5/19 and has an appt with cardiologist on 6/2. Patient states RN at coumadin clinic recommended  palliative care. Palliative has been discussed with patient on nrmerous occasions. Patient states that he would like to consider their services now. New ref placed.       Orders:    Ambulatory Referral to Palliative Care; Future

## 2025-05-01 NOTE — ASSESSMENT & PLAN NOTE
PHQ-2/9 Depression Screening    Little interest or pleasure in doing things: 3 - nearly every day  Feeling down, depressed, or hopeless: 3 - nearly every day  Trouble falling or staying asleep, or sleeping too much: 0 - not at all  Feeling tired or having little energy: 0 - not at all  Poor appetite or overeatin - not at all  Feeling bad about yourself - or that you are a failure or have let yourself or your family down: 2 - more than half the days  Trouble concentrating on things, such as reading the newspaper or watching television: 0 - not at all  Moving or speaking so slowly that other people could have noticed. Or the opposite - being so fidgety or restless that you have been moving around a lot more than usual: 0 - not at all  Thoughts that you would be better off dead, or of hurting yourself in some way: 0 - not at all  PHQ-9 Score: 8  PHQ-9 Interpretation: Mild depression      Patient declines treatment at this time.     Orders:    Ambulatory Referral to Palliative Care; Future

## 2025-05-01 NOTE — ASSESSMENT & PLAN NOTE
Follows with [facility] cards.  Orders:    Ambulatory Referral to Palliative Care; Future

## 2025-05-01 NOTE — PROGRESS NOTES
Virtual TCM Visit:Name: Larry Jenkins      : 1935      MRN: [id number]  Encounter Provider: SHAWNA Fisher  Encounter Date: 2025   Encounter department: [facility]  :  Assessment & Plan  Chronic systolic heart failure (HCC)  Wt Readings from Last 3 Encounters:   25 78.1 kg (172 lb 2.9 oz)   04/15/25 78.5 kg (173 lb)   25 79.4 kg (175 lb)     Patient hospitalized from - after presenting to the ER for SOB for several weeks . Patient with elevated troponins and BNP.       25 echo---    Left Ventricle: Left ventricular cavity size is normal. Wall thickness is moderately increased. The left ventricular ejection fraction is 25-30%. Systolic function is severely reduced. There is severe global hypokinesis with regional variation.    Right Ventricle: Right ventricular cavity size is dilated. Systolic function is moderately reduced.    Left Atrium: The atrium is severely dilated.    Right Atrium: The atrium is dilated.    Aortic Valve: There is mild regurgitation. There is aortic valve sclerosis.    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 Valve leaflet motion was not well visualized. The gradient recorded across the prosthetic mitral valve is within the expected range.    Aorta: The aortic root is mildly dilated. The aortic root is 4.30 cm. The ascending aorta is 3.5 cm.        Patient was encouraged to monitor DWTs. Reports compliance and weights have been stable at 161 lb at home. Imdur, lisinopril, and ranexa remain on hold. Patient was started on spironolactone and has been compliant since discharge. Patient follows with [facility]--- next appt is with NP on  and has an appt with cardiologist on . Patient states RN at coumadin clinic recommended  palliative care. Palliative has been discussed with patient on nrmerous occasions. Patient states that he would like to consider their services now. New ref placed.        Orders:    Ambulatory Referral to Palliative Care; Future    Coronary artery disease involving other coronary artery bypass graft, unspecified whether angina present  Follows with [facility] cards.  Orders:    Ambulatory Referral to Palliative Care; Future    Depression, recurrent (HCC)  PHQ-2/9 Depression Screening    Little interest or pleasure in doing things: 3 - nearly every day  Feeling down, depressed, or hopeless: 3 - nearly every day  Trouble falling or staying asleep, or sleeping too much: 0 - not at all  Feeling tired or having little energy: 0 - not at all  Poor appetite or overeatin - not at all  Feeling bad about yourself - or that you are a failure or have let yourself or your family down: 2 - more than half the days  Trouble concentrating on things, such as reading the newspaper or watching television: 0 - not at all  Moving or speaking so slowly that other people could have noticed. Or the opposite - being so fidgety or restless that you have been moving around a lot more than usual: 0 - not at all  Thoughts that you would be better off dead, or of hurting yourself in some way: 0 - not at all  PHQ-9 Score: 8  PHQ-9 Interpretation: Mild depression      Patient declines treatment at this time.     Orders:    Ambulatory Referral to Palliative Care; Future    Paroxysmal atrial fibrillation (HCC)  Follows with [facility] cards. Patient with pacer--- pacemaker generator change 23. Continue metoprolol and warfarin. Follows with [facility] coumadin clinic. Son reports most recent INR 25 was 2.3. Is scheduled to follow up for lab draw today.   Orders:    Ambulatory Referral to Palliative Care; Future    Primary hypertension  Lisinopril remains on hold. Continue metoprolol. Check CMP for renal function.   Orders:    Ambulatory Referral to Palliative Care; Future    Anemia of chronic disease  Anemia not present last CBC but + thrombocytopenia. Trend CBC.  Component      Latest Ref Rng 2025   WBC      4.31  - 10.16 Thousand/uL 5.93    RBC      3.88 - 5.62 Million/uL 4.36    Hemoglobin      12.0 - 17.0 g/dL 12.7    Hematocrit      36.5 - 49.3 % 39.9    MCV      82 - 98 fL 92    MCH      26.8 - 34.3 pg 29.1    MCHC      31.4 - 37.4 g/dL 31.8    RDW      11.6 - 15.1 % 14.7    MPV      8.9 - 12.7 fL 12.2    Platelet Count      149 - 390 Thousands/uL 133 (L)    nRBC      /100 WBCs 0    Segmented %      43 - 75 % 62    Immature Grans %      0 - 2 % 1    Lymphocytes %      14 - 44 % 18    Monocytes %      4 - 12 % 14 (H)    Eosinophils %      0 - 6 % 4    Basophils %      0 - 1 % 1    Absolute Neutrophils      1.85 - 7.62 Thousands/µL 3.74    Absolute Immature Grans      0.00 - 0.20 Thousand/uL 0.03    Absolute Lymphocytes      0.60 - 4.47 Thousands/µL 1.08    Absolute Monocytes      0.17 - 1.22 Thousand/µL 0.80    Absolute Eosinophils      0.00 - 0.61 Thousand/µL 0.25    Absolute Basophils      0.00 - 0.10 Thousands/µL 0.03       Legend:  (L) Low  (H) High        Orders:    CBC and differential; Future    Elevated serum creatinine    Orders:    Comprehensive metabolic panel; Future    Transition of care         Chronic systolic heart failure (HCC)  Wt Readings from Last 3 Encounters:   04/21/25 78.1 kg (172 lb 2.9 oz)   04/15/25 78.5 kg (173 lb)   03/07/25 79.4 kg (175 lb)                  Coronary artery disease involving other coronary artery bypass graft, unspecified whether angina present         Depression, recurrent (HCC)         Paroxysmal atrial fibrillation (HCC)         Primary hypertension                 Depression Screening and Follow-up Plan: Patient's depression screening was positive with a PHQ-9 score of 8.   Patient with underlying depression and was advised to continue current medications as prescribed. Patient advised to follow-up with PCP for further management.       History of Present Illness     Transitional Care Management Review:   Larry Jenkins is a 89 y.o. male here for TCM follow up.    During  the TCM phone call patient stated:  TCM Call (since 4/17/2025)       Date and time call was made  4/24/2025  3:47 PM    Hospital care reviewed  Records reviewed    Patient was hospitialized at  [facility]    Date of Admission  04/19/25    Date of discharge  04/23/25    Diagnosis  Acute exacerbation of CHF (congestive heart failure) (    Disposition  Home    Were the patients medications reviewed and updated  Yes          TCM Call (since 4/17/2025)       Post hospital issues  None    Scheduled for follow up?  Yes    Patients specialists  Cardiologist    Cardiologist name  Maribel Cardio    Did you obtain your prescribed medications  Yes    Do you need help managing your prescriptions or medications  No    Is transportation to your appointment needed  Yes    Specify why  unable to drive-per Son Kvng he would like him driving as little as possible.    I have advised the patient to call PCP with any new or worsening symptoms  [facility] hospitalization for acute on chronic CHF.       Review of Systems   Constitutional:  Positive for fatigue. Negative for activity change and fever.   HENT:  Negative for congestion, ear pain, rhinorrhea and sore throat.    Eyes:  Negative for pain.   Respiratory:  Negative for cough, shortness of breath and wheezing.    Cardiovascular:  Negative for chest pain, palpitations and leg swelling.   Gastrointestinal:  Negative for abdominal pain, diarrhea, nausea and vomiting.   Musculoskeletal:  Negative for arthralgias and myalgias.   Skin:  Negative for rash.   Neurological:  Negative for dizziness, weakness and numbness.   Psychiatric/Behavioral:  Positive for dysphoric mood. Negative for suicidal ideas. The patient is not nervous/anxious.    All other systems reviewed and are negative.    Objective   There were no vitals taken for this visit.    Physical Exam  Vitals and nursing note reviewed.   Constitutional:       General: He is not in acute distress.      Appearance: Normal appearance. He is well-developed. He is not ill-appearing.   HENT:      Head: Normocephalic and atraumatic.      Right Ear: External ear normal.      Left Ear: External ear normal.   Pulmonary:      Effort: Pulmonary effort is normal. No respiratory distress.   Musculoskeletal:      Cervical back: Neck supple.   Neurological:      Mental Status: He is alert and oriented to person, place, and time. Mental status is at baseline.   Psychiatric:         Mood and Affect: Mood normal.         Behavior: Behavior normal.       Medications have been reviewed by provider in current encounter    Administrative Statements   Encounter provider SHAWNA Fisher    The Patient is located at Home and in the following state in which I hold an active license [place].    The patient was identified by name and date of birth. Larry Jenkins was informed that this is a telemedicine visit and that the visit is being conducted through the Epic Embedded platform. He agrees to proceed..  My office door was closed. PA sabine Ferreira was in the room. Patient agreed to allow Hanna to stay in room for appt. He acknowledged consent and understanding of privacy and security of the video platform. The patient has agreed to participate and understands they can discontinue the visit at any time.    I have spent a total time of 30 minutes in caring for this patient on the day of the visit/encounter including Diagnostic results, Risks and benefits of tx options, Instructions for management, Patient and family education, Importance of tx compliance, Risk factor reductions, Impressions, Documenting in the medical record, Reviewing/placing orders in the medical record (including tests, medications, and/or procedures), and Obtaining or reviewing history  , not including the time spent for establishing the audio/video connection.    SHAWNA Fisher

## 2025-05-01 NOTE — ASSESSMENT & PLAN NOTE
Lisinopril remains on hold. Continue metoprolol. Check CMP for renal function.   Orders:    Ambulatory Referral to Palliative Care; Future

## 2025-05-02 ENCOUNTER — PATIENT OUTREACH (OUTPATIENT)
Dept: CASE MANAGEMENT | Facility: OTHER | Age: OVER 89
End: 2025-05-02

## 2025-05-02 NOTE — PROGRESS NOTES
"Weekly call to HF pt. Larry reports that he is doing well. His weight today was 161 lbs and he states its been stable. He has \"always watched his sodium', as well. He denies shortness of breath, lower extremity swelling or fatigue. He also reports that he slept really well the past few nights and felt rested during the day.Since last week, he had a televisit with his PCP as his son was providing the help with the phone. Will follow up in 1 week.   "

## 2025-05-09 ENCOUNTER — PATIENT OUTREACH (OUTPATIENT)
Dept: CASE MANAGEMENT | Facility: OTHER | Age: OVER 89
End: 2025-05-09

## 2025-05-09 NOTE — PROGRESS NOTES
Weekly call for HF. Larry states that he is doing well. Denies any s/s exacerbation. Weight today was 160.4 lbs. He is still holding meds as recommended from discharge. He has cardiology f/u on 5/19. Will f/u again in 1 week.

## 2025-05-15 ENCOUNTER — PATIENT OUTREACH (OUTPATIENT)
Dept: CASE MANAGEMENT | Facility: OTHER | Age: OVER 89
End: 2025-05-15

## 2025-05-15 NOTE — PROGRESS NOTES
Weekly call placed to HF pt. Larry states that he is doing fine. His weight has been stable at 160.4 lbs. He did see cardiology yesterday and was restarted on lisnopril only despite 3 meds on hold. He reports one of the other ones on hold made him constipated. He states that he has labs to be drawn in June and his PT /INR is in 2 weeks. Larry denies any other concerns at this time. Will follow up and close out on 5/21.

## 2025-05-21 ENCOUNTER — PATIENT OUTREACH (OUTPATIENT)
Dept: CASE MANAGEMENT | Facility: OTHER | Age: OVER 89
End: 2025-05-21

## 2025-05-21 NOTE — PROGRESS NOTES
Call placed to Don for follow up. PT was lower than what provider thought was desirable but within range. Pt states that weight today was higher at 161 lbs(1 pound gain)    Pt reports that pt will be having a nurse coming in to perform vitals next week as ordered by [facility] cardiology. Pt isnt sure if it is palliative nurse or who is coming out. HF program ends today. Pt appreciative of the past outreaches and has this CM's number if he should be interested.

## 2025-06-12 ENCOUNTER — RESULTS FOLLOW-UP (OUTPATIENT)
Dept: FAMILY MEDICINE CLINIC | Facility: CLINIC | Age: OVER 89
End: 2025-06-12

## 2025-06-12 ENCOUNTER — APPOINTMENT (OUTPATIENT)
Dept: LAB | Facility: HOSPITAL | Age: OVER 89
End: 2025-06-12
Payer: MEDICARE

## 2025-06-12 DIAGNOSIS — R79.89 ELEVATED SERUM CREATININE: ICD-10-CM

## 2025-06-12 DIAGNOSIS — D63.8 ANEMIA OF CHRONIC DISEASE: ICD-10-CM

## 2025-06-12 LAB
ALBUMIN SERPL BCG-MCNC: 4 G/DL (ref 3.5–5)
ALP SERPL-CCNC: 55 U/L (ref 34–104)
ALT SERPL W P-5'-P-CCNC: 17 U/L (ref 7–52)
ANION GAP SERPL CALCULATED.3IONS-SCNC: 4 MMOL/L (ref 4–13)
AST SERPL W P-5'-P-CCNC: 24 U/L (ref 13–39)
BASOPHILS # BLD AUTO: 0.05 THOUSANDS/ÂΜL (ref 0–0.1)
BASOPHILS NFR BLD AUTO: 1 % (ref 0–1)
BILIRUB SERPL-MCNC: 0.74 MG/DL (ref 0.2–1)
BUN SERPL-MCNC: 37 MG/DL (ref 5–25)
CALCIUM SERPL-MCNC: 9 MG/DL (ref 8.4–10.2)
CHLORIDE SERPL-SCNC: 105 MMOL/L (ref 96–108)
CO2 SERPL-SCNC: 27 MMOL/L (ref 21–32)
CREAT SERPL-MCNC: 1.26 MG/DL (ref 0.6–1.3)
EOSINOPHIL # BLD AUTO: 0.21 THOUSAND/ÂΜL (ref 0–0.61)
EOSINOPHIL NFR BLD AUTO: 5 % (ref 0–6)
ERYTHROCYTE [DISTWIDTH] IN BLOOD BY AUTOMATED COUNT: 14.2 % (ref 11.6–15.1)
GFR SERPL CREATININE-BSD FRML MDRD: 50 ML/MIN/1.73SQ M
GLUCOSE SERPL-MCNC: 86 MG/DL (ref 65–140)
HCT VFR BLD AUTO: 35.5 % (ref 36.5–49.3)
HGB BLD-MCNC: 11.4 G/DL (ref 12–17)
IMM GRANULOCYTES # BLD AUTO: 0.01 THOUSAND/UL (ref 0–0.2)
IMM GRANULOCYTES NFR BLD AUTO: 0 % (ref 0–2)
LYMPHOCYTES # BLD AUTO: 1.2 THOUSANDS/ÂΜL (ref 0.6–4.47)
LYMPHOCYTES NFR BLD AUTO: 26 % (ref 14–44)
MCH RBC QN AUTO: 29.6 PG (ref 26.8–34.3)
MCHC RBC AUTO-ENTMCNC: 32.1 G/DL (ref 31.4–37.4)
MCV RBC AUTO: 92 FL (ref 82–98)
MONOCYTES # BLD AUTO: 0.6 THOUSAND/ÂΜL (ref 0.17–1.22)
MONOCYTES NFR BLD AUTO: 13 % (ref 4–12)
NEUTROPHILS # BLD AUTO: 2.57 THOUSANDS/ÂΜL (ref 1.85–7.62)
NEUTS SEG NFR BLD AUTO: 55 % (ref 43–75)
NRBC BLD AUTO-RTO: 0 /100 WBCS
PLATELET # BLD AUTO: 176 THOUSANDS/UL (ref 149–390)
PMV BLD AUTO: 11.5 FL (ref 8.9–12.7)
POTASSIUM SERPL-SCNC: 5.5 MMOL/L (ref 3.5–5.3)
PROT SERPL-MCNC: 6.7 G/DL (ref 6.4–8.4)
RBC # BLD AUTO: 3.85 MILLION/UL (ref 3.88–5.62)
SODIUM SERPL-SCNC: 136 MMOL/L (ref 135–147)
WBC # BLD AUTO: 4.64 THOUSAND/UL (ref 4.31–10.16)

## 2025-06-12 PROCEDURE — 80053 COMPREHEN METABOLIC PANEL: CPT

## 2025-06-12 PROCEDURE — 85025 COMPLETE CBC W/AUTO DIFF WBC: CPT

## 2025-06-12 PROCEDURE — 36415 COLL VENOUS BLD VENIPUNCTURE: CPT

## 2025-06-12 NOTE — RESULT ENCOUNTER NOTE
Please notify patient:    Labs show anemia but not to a concerning level. No follow up needed at this time. Potassium was elevated at 5.5---recommend that he reaches out to Dr. Ayala, his cardiologist, as he is prescribed spironolactone, lisinopril, and a potassium supplement---all have the potential to increase potassium levels. They may want to decrease his potassium supplement, change meds, or just monitor it. We can fax labs over if they have Dr. Ayala's fax #

## 2025-06-27 ENCOUNTER — TRANSCRIBE ORDERS (OUTPATIENT)
Dept: LAB | Facility: HOSPITAL | Age: OVER 89
End: 2025-06-27

## 2025-06-27 DIAGNOSIS — I48.20 CHRONIC ATRIAL FIBRILLATION (HCC): ICD-10-CM

## 2025-06-27 DIAGNOSIS — Z95.2 PRESENCE OF PROSTHETIC HEART VALVE: ICD-10-CM

## 2025-06-27 DIAGNOSIS — Z79.01 LONG TERM (CURRENT) USE OF ANTICOAGULANTS: ICD-10-CM

## 2025-06-27 DIAGNOSIS — Z51.81 ENCOUNTER FOR THERAPEUTIC DRUG MONITORING: Primary | ICD-10-CM

## 2025-07-14 ENCOUNTER — APPOINTMENT (OUTPATIENT)
Dept: LAB | Facility: HOSPITAL | Age: OVER 89
End: 2025-07-14
Payer: MEDICARE

## 2025-07-14 DIAGNOSIS — Z51.81 ENCOUNTER FOR THERAPEUTIC DRUG MONITORING: ICD-10-CM

## 2025-07-14 DIAGNOSIS — I10 ESSENTIAL HYPERTENSION, MALIGNANT: ICD-10-CM

## 2025-07-14 DIAGNOSIS — I42.9 PRIMARY CARDIOMYOPATHY (HCC): Primary | ICD-10-CM

## 2025-07-14 DIAGNOSIS — Z95.2 PRESENCE OF PROSTHETIC HEART VALVE: ICD-10-CM

## 2025-07-14 DIAGNOSIS — Z79.01 LONG TERM (CURRENT) USE OF ANTICOAGULANTS: ICD-10-CM

## 2025-07-14 DIAGNOSIS — I48.20 CHRONIC ATRIAL FIBRILLATION (HCC): ICD-10-CM

## 2025-07-14 LAB
ANION GAP SERPL CALCULATED.3IONS-SCNC: 6 MMOL/L (ref 4–13)
BASOPHILS # BLD AUTO: 0.04 THOUSANDS/ÂΜL (ref 0–0.1)
BASOPHILS NFR BLD AUTO: 1 % (ref 0–1)
BUN SERPL-MCNC: 32 MG/DL (ref 5–25)
CALCIUM SERPL-MCNC: 8.6 MG/DL (ref 8.4–10.2)
CHLORIDE SERPL-SCNC: 105 MMOL/L (ref 96–108)
CO2 SERPL-SCNC: 29 MMOL/L (ref 21–32)
CREAT SERPL-MCNC: 1.25 MG/DL (ref 0.6–1.3)
EOSINOPHIL # BLD AUTO: 0.3 THOUSAND/ÂΜL (ref 0–0.61)
EOSINOPHIL NFR BLD AUTO: 7 % (ref 0–6)
ERYTHROCYTE [DISTWIDTH] IN BLOOD BY AUTOMATED COUNT: 14.9 % (ref 11.6–15.1)
GFR SERPL CREATININE-BSD FRML MDRD: 50 ML/MIN/1.73SQ M
GLUCOSE SERPL-MCNC: 128 MG/DL (ref 65–140)
HCT VFR BLD AUTO: 33.4 % (ref 36.5–49.3)
HGB BLD-MCNC: 10.5 G/DL (ref 12–17)
IMM GRANULOCYTES # BLD AUTO: 0.02 THOUSAND/UL (ref 0–0.2)
IMM GRANULOCYTES NFR BLD AUTO: 0 % (ref 0–2)
INR PPP: 2.14 (ref 0.85–1.19)
LYMPHOCYTES # BLD AUTO: 1.03 THOUSANDS/ÂΜL (ref 0.6–4.47)
LYMPHOCYTES NFR BLD AUTO: 23 % (ref 14–44)
MCH RBC QN AUTO: 29.2 PG (ref 26.8–34.3)
MCHC RBC AUTO-ENTMCNC: 31.4 G/DL (ref 31.4–37.4)
MCV RBC AUTO: 93 FL (ref 82–98)
MONOCYTES # BLD AUTO: 0.49 THOUSAND/ÂΜL (ref 0.17–1.22)
MONOCYTES NFR BLD AUTO: 11 % (ref 4–12)
NEUTROPHILS # BLD AUTO: 2.66 THOUSANDS/ÂΜL (ref 1.85–7.62)
NEUTS SEG NFR BLD AUTO: 58 % (ref 43–75)
NRBC BLD AUTO-RTO: 0 /100 WBCS
PLATELET # BLD AUTO: 157 THOUSANDS/UL (ref 149–390)
PMV BLD AUTO: 11.5 FL (ref 8.9–12.7)
POTASSIUM SERPL-SCNC: 4.7 MMOL/L (ref 3.5–5.3)
PROTHROMBIN TIME: 23.9 SECONDS (ref 12.3–15)
RBC # BLD AUTO: 3.6 MILLION/UL (ref 3.88–5.62)
SODIUM SERPL-SCNC: 140 MMOL/L (ref 135–147)
WBC # BLD AUTO: 4.54 THOUSAND/UL (ref 4.31–10.16)

## 2025-07-14 PROCEDURE — 36415 COLL VENOUS BLD VENIPUNCTURE: CPT

## 2025-07-14 PROCEDURE — 85610 PROTHROMBIN TIME: CPT

## 2025-07-14 PROCEDURE — 80048 BASIC METABOLIC PNL TOTAL CA: CPT

## 2025-07-14 PROCEDURE — 85025 COMPLETE CBC W/AUTO DIFF WBC: CPT

## 2025-08-08 ENCOUNTER — OFFICE VISIT (OUTPATIENT)
Age: OVER 89
End: 2025-08-08
Payer: MEDICARE

## 2025-08-08 VITALS
HEART RATE: 70 BPM | RESPIRATION RATE: 16 BRPM | OXYGEN SATURATION: 98 % | WEIGHT: 162 LBS | TEMPERATURE: 97.9 F | DIASTOLIC BLOOD PRESSURE: 64 MMHG | SYSTOLIC BLOOD PRESSURE: 110 MMHG | BODY MASS INDEX: 22.59 KG/M2

## 2025-08-08 DIAGNOSIS — F33.9 DEPRESSION, RECURRENT (HCC): ICD-10-CM

## 2025-08-08 DIAGNOSIS — Z51.5 PALLIATIVE CARE ENCOUNTER: Primary | ICD-10-CM

## 2025-08-08 DIAGNOSIS — N18.31 STAGE 3A CHRONIC KIDNEY DISEASE (HCC): ICD-10-CM

## 2025-08-08 DIAGNOSIS — I50.22 CHRONIC SYSTOLIC HEART FAILURE (HCC): ICD-10-CM

## 2025-08-08 PROCEDURE — G2211 COMPLEX E/M VISIT ADD ON: HCPCS

## 2025-08-08 PROCEDURE — 99349 HOME/RES VST EST MOD MDM 40: CPT

## 2025-08-08 RX ORDER — AMOXICILLIN 500 MG/1
2000 CAPSULE ORAL AS NEEDED
COMMUNITY

## (undated) DEVICE — SYRINGE 50ML LL

## (undated) DEVICE — 1200CC GUARDIAN II: Brand: GUARDIAN

## (undated) DEVICE — SINGLE-USE BIOPSY FORCEPS: Brand: RADIAL JAW 4

## (undated) DEVICE — TUBING SUCTION 5MM X 12 FT

## (undated) DEVICE — BIPOLAR ELECTROHEMOSTASIS CATHETER: Brand: GOLD PROBE

## (undated) DEVICE — BITE BLOCK ADULT 11FR OMNI BLOC

## (undated) DEVICE — SYRINGE 30ML LL